# Patient Record
Sex: FEMALE | Race: BLACK OR AFRICAN AMERICAN | NOT HISPANIC OR LATINO | Employment: FULL TIME | URBAN - METROPOLITAN AREA
[De-identification: names, ages, dates, MRNs, and addresses within clinical notes are randomized per-mention and may not be internally consistent; named-entity substitution may affect disease eponyms.]

---

## 2017-04-11 ENCOUNTER — TELEPHONE (OUTPATIENT)
Dept: ORTHOPEDICS | Facility: CLINIC | Age: 32
End: 2017-04-11

## 2017-04-11 NOTE — TELEPHONE ENCOUNTER
----- Message from Yolis Parson sent at 4/11/2017  1:38 PM CDT -----  Contact: ANGELLA VELAZCO [[id number]]  x_  1st Request  _  2nd Request  _  3rd Request        Who: ANGELLA VELAZCO [[id number]]    Why: Patient would like to schedule injection, Please call, Thanks!    What Number to Call Back: [phone]    When to Expect a call back: (Before the end of the day)   -- if the call is after 12:00, the call back will be tomorrow.

## 2017-04-12 ENCOUNTER — OFFICE VISIT (OUTPATIENT)
Dept: ORTHOPEDICS | Facility: CLINIC | Age: 32
End: 2017-04-12
Payer: COMMERCIAL

## 2017-04-12 VITALS
SYSTOLIC BLOOD PRESSURE: 126 MMHG | DIASTOLIC BLOOD PRESSURE: 83 MMHG | BODY MASS INDEX: 32.27 KG/M2 | WEIGHT: 182.13 LBS | HEIGHT: 63 IN | HEART RATE: 80 BPM

## 2017-04-12 DIAGNOSIS — R52 PAIN: ICD-10-CM

## 2017-04-12 DIAGNOSIS — G56.03 BILATERAL CARPAL TUNNEL SYNDROME: Primary | ICD-10-CM

## 2017-04-12 PROCEDURE — 99213 OFFICE O/P EST LOW 20 MIN: CPT | Mod: 25,S$GLB,, | Performed by: PLASTIC SURGERY

## 2017-04-12 PROCEDURE — 99999 PR PBB SHADOW E&M-EST. PATIENT-LVL III: CPT | Mod: PBBFAC,,, | Performed by: PLASTIC SURGERY

## 2017-04-12 PROCEDURE — 20526 THER INJECTION CARP TUNNEL: CPT | Mod: 50,S$GLB,, | Performed by: PLASTIC SURGERY

## 2017-04-12 PROCEDURE — 1160F RVW MEDS BY RX/DR IN RCRD: CPT | Mod: S$GLB,,, | Performed by: PLASTIC SURGERY

## 2017-04-12 RX ORDER — TRIAMCINOLONE ACETONIDE 40 MG/ML
80 INJECTION, SUSPENSION INTRA-ARTICULAR; INTRAMUSCULAR
Status: COMPLETED | OUTPATIENT
Start: 2017-04-12 | End: 2017-04-12

## 2017-04-12 RX ORDER — LIDOCAINE HYDROCHLORIDE 10 MG/ML
2 INJECTION INFILTRATION; PERINEURAL
Status: COMPLETED | OUTPATIENT
Start: 2017-04-12 | End: 2017-04-12

## 2017-04-12 RX ADMIN — LIDOCAINE HYDROCHLORIDE 2 ML: 10 INJECTION INFILTRATION; PERINEURAL at 09:04

## 2017-04-12 RX ADMIN — TRIAMCINOLONE ACETONIDE 80 MG: 40 INJECTION, SUSPENSION INTRA-ARTICULAR; INTRAMUSCULAR at 09:04

## 2017-04-12 NOTE — MR AVS SNAPSHOT
[facility]  [address], [address]  [facility] [address]  Phone: [phone]                  Mela Blanco   2017 8:30 AM   Office Visit    Description:  Female : 1985   Provider:  Michael Agrawal Jr., MD   Department:  [facility]           Reason for Visit     Right Hand - Pain     Left Hand - Pain           Diagnoses this Visit        Comments    Bilateral carpal tunnel syndrome    -  Primary     Pain                To Do List           Goals (5 Years of Data)     None      [facility] On Call     [facility] On Call Nurse Care Line -  Assistance  Unless otherwise directed by your provider, please contact [facility], our nurse care line that is available for  assistance.     Registered nurses in the [facility] provide: appointment scheduling, clinical advisement, health education, and other advisory services.  Call: [phone] (toll free)               Medications           Message regarding Medications     Verify the changes and/or additions to your medication regime listed below are the same as discussed with your clinician today.  If any of these changes or additions are incorrect, please notify your healthcare provider.        These medications were administered today        Dose Freq    triamcinolone acetonide injection 80 mg 80 mg Clinic/HOD 1 time    Si mLs (80 mg total) by Other route one time.    Class: Normal    Route: Other    lidocaine HCL 10 mg/ml (1%) injection 2 mL 2 mL Clinic/HOD 1 time    Si mLs by Other route one time.    Class: Normal    Route: Other           Verify that the below list of medications is an accurate representation of the medications you are currently taking.  If none reported, the list may be blank. If incorrect, please contact your healthcare provider. Carry this list with you in case of emergency.           Current Medications     cetirizine (ZYRTEC) 10 MG tablet Take 10 mg by mouth  "once daily.    acetaminophen (TYLENOL) 325 MG tablet Take 325 mg by mouth every 6 (six) hours as needed for Pain.    ketoprofen (ORUDIS) 50 MG capsule Take 1 capsule (50 mg total) by mouth 3 (three) times daily as needed for Pain.           Clinical Reference Information           Your Vitals Were     BP Pulse Height Weight BMI    126/83 (BP Location: Left arm) 80 5' 3" (1.6 m) 82.6 kg (182 lb 1.6 oz) 32.26 kg/m2      Blood Pressure          Most Recent Value    BP  126/83      Allergies as of 4/12/2017     No Known Allergies      Immunizations Administered on Date of Encounter - 4/12/2017     None      Administrations This Visit     lidocaine HCL 10 mg/ml (1%) injection 2 mL     Admin Date Action Dose Route Administered By             04/12/2017 Given 2 mL Other Michael Agrawal Jr., MD                    triamcinolone acetonide injection 80 mg     Admin Date Action Dose Route Administered By             04/12/2017 Given 80 mg Other Michael Agrawal Jr., MD                      Language Assistance Services     ATTENTION: Language assistance services are available, free of charge. Please call [phone].      ATENCIÓN: Si sallie quinn, tiene a hernandez disposición servicios gratuitos de asistencia lingüística. Llame al [phone].     CHÚ Ý: N?u b?n nói Ti?ng Vi?t, có các d?ch v? h? tr? ngôn ng? mi?n phí dành cho b?n. G?i s? [phone].         [facility] complies with applicable Federal civil rights laws and does not discriminate on the basis of race, color, national origin, age, disability, or sex.        "

## 2017-04-12 NOTE — PROGRESS NOTES
"HISTORY OF PRESENT ILLNESS:  Mela returns today due to recurrent bilateral   carpal tunnel symptoms.  The patient last received injections in January 2017.    She states that the injections lasted for several months and now she is   beginning to experience increased symptoms, particularly in the morning.  She   also complains of occasional nocturnal symptoms with numbness and tingling.  She   states that her right is more severe than her left.  She is seeking bilateral   injections today into both wrists.  She states that she is unable to proceed   with surgery at this time due to limited availability off from work and she has   no other complaints today.    PHYSICAL EXAMINATION:  VITAL SIGNS:  /83 (BP Location: Left arm)  Pulse 80  Ht 5' 3" (1.6 m)  Wt 82.6 kg (182 lb 1.6 oz)  BMI 32.26 kg/m2    GENERAL:  No distress.  Alert and oriented x3, cooperative, well nourished.  EXTREMITIES:  Right hand, positive Tinel's, negative Durkan sign over the carpal   tunnel.  She has good opposition.  No thenar or hypothenar atrophy.  She is   neurovascularly intact in the median, radial and ulnar distributions.  She has   full active range of motion of all digits in all joints.  Left hand, negative   Tinel's and Durkan sign over the carpal tunnel.  She has good opposition.  No   thenar or hypothenar atrophy.  Normal sensation in the median, radial and ulnar   distributions.  Full active range of motion of all digits in all joints.    PROCEDURE: I have explained the risks, benefits, and alternatives of the procedure in detail.  The patient voices understanding and all questions have been answered.  The patient agrees to proceed as planned. After a sterile prep of the skin the bilateral carpal tunnel is injected from the volar approach using a 25 gauge needle with a combination of 1cc 1% plain xylocaine and 40 mg of Kenalog.  The patient is cautioned and immediate relief of pain is secondary to the local anesthetic " and will be temporary.  After the anesthetic wears off there may be a increase in pain that may last for a few hours or a few days and they should use ice to help alleviate this flair up of pain.       ASSESSMENT:  Bilateral carpal tunnel syndrome.    PLAN:  Today, we discussed performing repeat injections into both wrists.  The   patient wished to proceed with this.  I further discussed that the patient   should seek to undergo the surgery in the near future to help permanently   alleviate her symptoms.  She states that she will try to find time off in her   schedule.  However, she will continue with conservative management and she will   follow up with me as needed until she is ready to pursue surgery.  All questions   and concerns were addressed prior to discharge.      DELANEY/SOLOMON  dd: 04/12/2017 09:05:40 (CDT)  td: 04/13/2017 04:36:46 (CDT)  Doc ID   #[id number]  Job ID #[id number]    CC:       Dictation Confirmation Code: [id number]  Michael Agrawal Jr. MD  04/12/2017  9:03 AM

## 2017-06-30 ENCOUNTER — OFFICE VISIT (OUTPATIENT)
Dept: ORTHOPEDICS | Facility: CLINIC | Age: 32
End: 2017-06-30
Payer: COMMERCIAL

## 2017-06-30 ENCOUNTER — TELEPHONE (OUTPATIENT)
Dept: ORTHOPEDICS | Facility: CLINIC | Age: 32
End: 2017-06-30

## 2017-06-30 VITALS
HEART RATE: 89 BPM | HEIGHT: 63 IN | SYSTOLIC BLOOD PRESSURE: 128 MMHG | DIASTOLIC BLOOD PRESSURE: 83 MMHG | BODY MASS INDEX: 33.13 KG/M2 | WEIGHT: 187 LBS

## 2017-06-30 DIAGNOSIS — G56.01 RIGHT CARPAL TUNNEL SYNDROME: Primary | ICD-10-CM

## 2017-06-30 PROCEDURE — 99212 OFFICE O/P EST SF 10 MIN: CPT | Mod: 25,S$GLB,, | Performed by: PLASTIC SURGERY

## 2017-06-30 PROCEDURE — 99999 PR PBB SHADOW E&M-EST. PATIENT-LVL II: CPT | Mod: PBBFAC,,, | Performed by: PLASTIC SURGERY

## 2017-06-30 PROCEDURE — 20526 THER INJECTION CARP TUNNEL: CPT | Mod: RT,S$GLB,, | Performed by: PLASTIC SURGERY

## 2017-06-30 RX ORDER — LIDOCAINE HYDROCHLORIDE 10 MG/ML
1 INJECTION INFILTRATION; PERINEURAL
Status: COMPLETED | OUTPATIENT
Start: 2017-06-30 | End: 2017-06-30

## 2017-06-30 RX ORDER — IBUPROFEN 200 MG
200 TABLET ORAL EVERY 6 HOURS PRN
COMMUNITY
End: 2017-10-06

## 2017-06-30 RX ORDER — TRIAMCINOLONE ACETONIDE 40 MG/ML
40 INJECTION, SUSPENSION INTRA-ARTICULAR; INTRAMUSCULAR
Status: COMPLETED | OUTPATIENT
Start: 2017-06-30 | End: 2017-06-30

## 2017-06-30 RX ADMIN — LIDOCAINE HYDROCHLORIDE 1 ML: 10 INJECTION INFILTRATION; PERINEURAL at 04:06

## 2017-06-30 RX ADMIN — TRIAMCINOLONE ACETONIDE 40 MG: 40 INJECTION, SUSPENSION INTRA-ARTICULAR; INTRAMUSCULAR at 04:06

## 2017-06-30 NOTE — TELEPHONE ENCOUNTER
----- Message from Yolis Parson sent at 6/30/2017  9:55 AM CDT -----  Contact: ANGELLA VELAZCO [[id number]]  _x  1st Request  _  2nd Request  _  3rd Request        Who: ANGELLA VELAZCO [[id number]]    Why: Patient would like to be seen today for injection in right wrist for carpal tunnel. Please call, Thanks!     What Number to Call Back: [phone]    When to Expect a call back: (With in 24 hours)

## 2017-06-30 NOTE — PROGRESS NOTES
"HISTORY OF PRESENT ILLNESS:  Mela returns today due to recurrent right   carpal tunnel symptoms.  The patient last received injections in April.    She states that the injections worked well for her left hand but now she is   beginning to experience increased symptoms, particularly in the morning in the right.   She states that she is unable to proceed   with surgery at this time due to limited availability off from work and she has   no other complaints today.    PHYSICAL EXAMINATION:  VITAL SIGNS:  /83   Pulse 89   Ht 5' 3" (1.6 m)   Wt 84.8 kg (187 lb)   BMI 33.13 kg/m²     GENERAL:  No distress.  Alert and oriented x3, cooperative, well nourished.  EXTREMITIES:  Right hand, positive Tinel's, negative Durkan sign over the carpal   tunnel.  She has good opposition.  No thenar or hypothenar atrophy.  She is   neurovascularly intact in the median, radial and ulnar distributions.  She has   full active range of motion of all digits in all joints.  Left hand, negative   Tinel's and Durkan sign over the carpal tunnel.  She has good opposition.  No   thenar or hypothenar atrophy.  Normal sensation in the median, radial and ulnar   distributions.  Full active range of motion of all digits in all joints.    PROCEDURE: I have explained the risks, benefits, and alternatives of the procedure in detail.  The patient voices understanding and all questions have been answered.  The patient agrees to proceed as planned. After a sterile prep of the skin the right carpal tunnel is injected from the volar approach using a 25 gauge needle with a combination of 1cc 1% plain xylocaine and 40 mg of Kenalog.  The patient is cautioned and immediate relief of pain is secondary to the local anesthetic and will be temporary.  After the anesthetic wears off there may be a increase in pain that may last for a few hours or a few days and they should use ice to help alleviate this flair up of pain.       ASSESSMENT:  Bilateral " carpal tunnel syndrome.    PLAN:  Today, we discussed performing repeat injections into right carpal tunnel today.  The   patient wished to proceed with this.  I further discussed that the patient   should seek to undergo the surgery in the near future to help permanently   alleviate her symptoms.  She states that she will try to find time off in her   schedule.  All questions   and concerns were addressed prior to discharge.

## 2017-07-26 ENCOUNTER — TELEPHONE (OUTPATIENT)
Dept: INTERNAL MEDICINE | Facility: CLINIC | Age: 32
End: 2017-07-26

## 2017-07-26 NOTE — TELEPHONE ENCOUNTER
----- Message from Jeana Novoa sent at 7/26/2017 12:48 PM CDT -----  Contact: self/[phone]  Patient called in regards needing to place an order for labs for her annual. Please call and advise.       Thank you!!!

## 2017-08-16 ENCOUNTER — OFFICE VISIT (OUTPATIENT)
Dept: ORTHOPEDICS | Facility: CLINIC | Age: 32
End: 2017-08-16
Payer: COMMERCIAL

## 2017-08-16 VITALS
HEIGHT: 63 IN | DIASTOLIC BLOOD PRESSURE: 83 MMHG | SYSTOLIC BLOOD PRESSURE: 120 MMHG | BODY MASS INDEX: 33.13 KG/M2 | RESPIRATION RATE: 18 BRPM | WEIGHT: 187 LBS | HEART RATE: 83 BPM

## 2017-08-16 DIAGNOSIS — G56.02 LEFT CARPAL TUNNEL SYNDROME: Primary | ICD-10-CM

## 2017-08-16 PROCEDURE — 20526 THER INJECTION CARP TUNNEL: CPT | Mod: LT,S$GLB,, | Performed by: PLASTIC SURGERY

## 2017-08-16 PROCEDURE — 99999 PR PBB SHADOW E&M-EST. PATIENT-LVL III: CPT | Mod: PBBFAC,,, | Performed by: PLASTIC SURGERY

## 2017-08-16 PROCEDURE — 3008F BODY MASS INDEX DOCD: CPT | Mod: S$GLB,,, | Performed by: PLASTIC SURGERY

## 2017-08-16 PROCEDURE — 99212 OFFICE O/P EST SF 10 MIN: CPT | Mod: 25,S$GLB,, | Performed by: PLASTIC SURGERY

## 2017-08-16 RX ORDER — LIDOCAINE HYDROCHLORIDE 10 MG/ML
1 INJECTION, SOLUTION EPIDURAL; INFILTRATION; INTRACAUDAL; PERINEURAL ONCE
Status: COMPLETED | OUTPATIENT
Start: 2017-08-16 | End: 2017-08-16

## 2017-08-16 RX ORDER — TRIAMCINOLONE ACETONIDE 40 MG/ML
40 INJECTION, SUSPENSION INTRA-ARTICULAR; INTRAMUSCULAR
Status: COMPLETED | OUTPATIENT
Start: 2017-08-16 | End: 2017-08-16

## 2017-08-16 RX ADMIN — TRIAMCINOLONE ACETONIDE 40 MG: 40 INJECTION, SUSPENSION INTRA-ARTICULAR; INTRAMUSCULAR at 09:08

## 2017-08-16 RX ADMIN — LIDOCAINE HYDROCHLORIDE 10 MG: 10 INJECTION, SOLUTION EPIDURAL; INFILTRATION; INTRACAUDAL; PERINEURAL at 10:08

## 2017-08-16 NOTE — PROGRESS NOTES
"HISTORY OF PRESENT ILLNESS:  Ms. Blanco returns today due to increasing   numbness and tingling and occasional discomfort and pain in the left hand and   fingers.  The patient states that her last injection was in April 2017 from   which she had significant relief up until several weeks ago when she began to   experience her symptoms again.  She states that her right hand also experiences   some numbness and tingling; however, it is not nearly as bad.  She states that   she is unable to schedule surgery at this time as she has a mail delivery route   and time off at this point just before peak season is very little and therefore,   she is unable to pursue surgery.  She has no other complaints today.    PHYSICAL EXAMINATION:  /83   Pulse 83   Resp 18   Ht 5' 3" (1.6 m)   Wt 84.8 kg (187 lb)   BMI 33.13 kg/m²     GENERAL:  No acute distress, alert and oriented x3, cooperative, well nourished.  LUNGS:  Nonlabored, on room air .  CARDIOVASCULAR:  Distal pulses intact.  Good capillary refill.  No clubbing,   cyanosis or edema. LEFT UPPER EXTREMITY:  She has full active range of motion of   all digits in all joints.  She has good opposition.  No thenar or hypothenar   atrophy.  Normal sensation in median, radial and ulnar distributions.  Positive   Tinel's and Durkan sign over the carpal tunnel with good capillary refill.    PROCEDURE:  I have explained the risks, benefits, and alternatives of the procedure in detail.  The patient voices understanding and all questions have been answered.  The patient agrees to proceed as planned. After a sterile prep of the skin the left carpal tunnel is injected from the volar approach using a 25 gauge needle with a combination of 1cc 1% plain xylocaine and 40 mg of Kenalog.  The patient is cautioned and immediate relief of pain is secondary to the local anesthetic and will be temporary.  After the anesthetic wears off there may be a increase in pain that may last for a few " hours or a few days and they should use ice to help alleviate this flair up of pain.       ASSESSMENT:  Left carpal tunnel syndrome.    PLAN:  Due to the patient's inability to take time off her work and her need for   relief from her painful symptoms, I discussed performing a repeat injection   into the carpal tunnel.  I further discussed that multiple injections puts her   at higher risk for complications such as tendon injury or rupture.  The patient   understood this.  She states that she is absolutely unable to pursue surgery at   this time.  She is hoping that after the busy holiday season then she may be   able to take time off to have both hands done at the same time.  Therefore, she   was offered the injection.  She wished to proceed.  Please see the procedure   note above.  She will follow up with me as needed.  All questions and concerns   were addressed prior to discharge.      DELANEY/SOLOMON  dd: 08/16/2017 09:29:15 (CDT)  td: 08/16/2017 12:20:45 (CDT)  Doc ID   #[id number]  Job ID #[id number]    CC:       Dictation Confirmation Code: [id number]  Michael Agrawal Jr. MD  08/16/2017  9:25 AM

## 2017-10-06 ENCOUNTER — HOSPITAL ENCOUNTER (OUTPATIENT)
Dept: RADIOLOGY | Facility: HOSPITAL | Age: 32
Discharge: HOME | End: 2017-10-06
Attending: INTERNAL MEDICINE
Payer: COMMERCIAL

## 2017-10-06 ENCOUNTER — OFFICE VISIT (OUTPATIENT)
Dept: INTERNAL MEDICINE | Facility: CLINIC | Age: 32
End: 2017-10-06
Payer: COMMERCIAL

## 2017-10-06 VITALS
HEART RATE: 81 BPM | BODY MASS INDEX: 34.76 KG/M2 | HEIGHT: 63 IN | WEIGHT: 196.19 LBS | SYSTOLIC BLOOD PRESSURE: 108 MMHG | DIASTOLIC BLOOD PRESSURE: 80 MMHG

## 2017-10-06 DIAGNOSIS — M25.561 CHRONIC PAIN OF RIGHT KNEE: ICD-10-CM

## 2017-10-06 DIAGNOSIS — Z00.00 ROUTINE MEDICAL EXAM: Primary | ICD-10-CM

## 2017-10-06 DIAGNOSIS — E66.9 OBESITY (BMI 30.0-34.9): ICD-10-CM

## 2017-10-06 DIAGNOSIS — G89.29 CHRONIC PAIN OF RIGHT KNEE: ICD-10-CM

## 2017-10-06 DIAGNOSIS — M79.671 BILATERAL FOOT PAIN: ICD-10-CM

## 2017-10-06 DIAGNOSIS — M79.672 BILATERAL FOOT PAIN: ICD-10-CM

## 2017-10-06 DIAGNOSIS — Z98.84 BARIATRIC SURGERY STATUS: ICD-10-CM

## 2017-10-06 DIAGNOSIS — J30.89 PERENNIAL ALLERGIC RHINITIS: ICD-10-CM

## 2017-10-06 DIAGNOSIS — K21.9 GASTROESOPHAGEAL REFLUX DISEASE, ESOPHAGITIS PRESENCE NOT SPECIFIED: ICD-10-CM

## 2017-10-06 DIAGNOSIS — Z83.3 FAMILY HISTORY OF TYPE 2 DIABETES MELLITUS: ICD-10-CM

## 2017-10-06 PROCEDURE — 73560 X-RAY EXAM OF KNEE 1 OR 2: CPT | Mod: TC,RT

## 2017-10-06 PROCEDURE — 99999 PR PBB SHADOW E&M-EST. PATIENT-LVL IV: CPT | Mod: PBBFAC,,, | Performed by: INTERNAL MEDICINE

## 2017-10-06 PROCEDURE — 73560 X-RAY EXAM OF KNEE 1 OR 2: CPT | Mod: 26,RT,, | Performed by: RADIOLOGY

## 2017-10-06 PROCEDURE — 99395 PREV VISIT EST AGE 18-39: CPT | Mod: S$GLB,,, | Performed by: INTERNAL MEDICINE

## 2017-10-06 RX ORDER — CETIRIZINE HYDROCHLORIDE 10 MG/1
10 TABLET ORAL DAILY
Qty: 30 TABLET | Refills: 11 | Status: SHIPPED | OUTPATIENT
Start: 2017-10-06 | End: 2018-02-21 | Stop reason: SDUPTHER

## 2017-10-06 RX ORDER — ONDANSETRON HYDROCHLORIDE 8 MG/1
TABLET, FILM COATED ORAL
Refills: 2 | COMMUNITY
Start: 2017-07-26 | End: 2017-10-06

## 2017-10-06 RX ORDER — IBUPROFEN 600 MG/1
600 TABLET ORAL EVERY 8 HOURS PRN
Qty: 40 TABLET | Refills: 5 | Status: SHIPPED | OUTPATIENT
Start: 2017-10-06 | End: 2018-03-23

## 2017-10-06 RX ORDER — PHENTERMINE HYDROCHLORIDE 37.5 MG/1
TABLET ORAL
COMMUNITY
Start: 2017-09-25 | End: 2017-11-01

## 2017-10-06 NOTE — PROGRESS NOTES
Subjective:       Patient ID: Mela Blanco is a 31 y.o. female.    Chief Complaint: Annual Exam    Last seen 28 months ago. Returns for annual exam with no acute complaints. Multiple chronic problems she has been managing on her own that she would like assistance with - see below.    PMH: , miscx1, abx1.  H/O Headaches.   Carpal Tunnel Syndrome - Ortho .   Bariatric Surgery status, seen recently by Surgeon Dr. Terra Denney/[facility] - Adipex prescribed, no labs done.     PSH: Breast reduction. Lap band . Abscess right thigh.     Pelvic exam  -Gyn in [facility]. Eye exam 10/16. Declines Flu shots. Labs 5/15: CBC and CMP normal, HbA1c 5.2%, TSH 0.672, Vit D 20; Ferritin, B12 and Folate normal, TChol 173, TG 56, HDL 66, LDL 96, Urinalysis clear.     Social: Non-smoker, occasional alcohol. Sexually active with condoms, daughter. .     FMH: Arthritis in father. HTN and DM in mother. Stroke and heart disease in grandparents.     NKDA.     Medications: OTC Ibuprofen up to 800mg almost daily. Mirena IUD. OTC Zyrtec and Zantac prn. No vitamins at present.         Review of Systems   Constitutional: Negative for activity change, appetite change, fatigue, fever and unexpected weight change.   HENT: Positive for rhinorrhea and sneezing. Negative for congestion, ear pain, hearing loss, sinus pain, sinus pressure, sore throat, tinnitus, trouble swallowing and voice change.         Chronic rhinitis relieved with Zyrtec, she would like it prescribed. No sinus pain or purulent nasal discharge.    Eyes: Negative for pain, redness and visual disturbance.   Respiratory: Negative for cough, chest tightness, shortness of breath and wheezing.    Cardiovascular: Negative for chest pain, palpitations and leg swelling.   Gastrointestinal: Negative for abdominal pain, anal bleeding, blood in stool, constipation, diarrhea, nausea and vomiting.        Frequent heartburn with spicy or greasy  "food, uses Zantac with relief, would like it prescribed.    Genitourinary: Negative for difficulty urinating, dysuria, flank pain, frequency, hematuria and urgency.        Irregular menstrual bleeding since receiving IUD six months ago.    Musculoskeletal: Positive for arthralgias. Negative for back pain, gait problem, joint swelling, myalgias and neck pain.        Chronic right knee pain x years, not previously evaluated - it does not swell. Chronic bilateral foot pain, plantar aspect - aggravated by her job as a . Uses Ibuprofen quite regularly.    Skin: Negative for color change and rash.   Neurological: Negative for dizziness, syncope, facial asymmetry, speech difficulty, weakness, numbness and headaches.   Hematological: Negative for adenopathy. Does not bruise/bleed easily.   Psychiatric/Behavioral: Negative for decreased concentration, dysphoric mood and sleep disturbance. The patient is not nervous/anxious.        Objective:    /80, Pulse 81, Ht 5' 3", Wt 196 lbs (from 171 two years ago), BMI=34.76  Physical Exam   Constitutional: She is oriented to person, place, and time. She appears well-developed and well-nourished. No distress.   HENT:   Head: Normocephalic and atraumatic.   Right Ear: External ear normal.   Left Ear: External ear normal.   Nose: Nose normal.   Mouth/Throat: Oropharynx is clear and moist. No oropharyngeal exudate.   Eyes: Conjunctivae and EOM are normal. Pupils are equal, round, and reactive to light. Right conjunctiva is not injected. Left conjunctiva is not injected. No scleral icterus.   Neck: Normal range of motion. Neck supple. No JVD present. No thyromegaly present.   Cardiovascular: Normal rate, regular rhythm, normal heart sounds and intact distal pulses.  Exam reveals no gallop and no friction rub.    No murmur heard.  Pulmonary/Chest: Effort normal and breath sounds normal. No respiratory distress. She has no wheezes. She has no rhonchi. She has no rales. "   Abdominal: Soft. Bowel sounds are normal. She exhibits no distension and no mass. There is no hepatosplenomegaly. There is no tenderness. There is no CVA tenderness.   Musculoskeletal: Normal range of motion. She exhibits no edema, tenderness or deformity.   Mild crepitus in right knee, no effusion or tenderness. Foot exam normal bilaterally, no skin lesions, bony deformity.    Lymphadenopathy:     She has no cervical adenopathy.   Neurological: She is alert and oriented to person, place, and time. She has normal strength and normal reflexes. No cranial nerve deficit. She exhibits normal muscle tone. Coordination and gait normal.   Skin: Skin is warm and dry. No lesion and no rash noted. She is not diaphoretic. No cyanosis or erythema. No pallor. Nails show no clubbing.   Psychiatric: She has a normal mood and affect. Her behavior is normal. Judgment and thought content normal.   Vitals reviewed.      Assessment:       1. Routine medical exam        Plan:       Routine medical exam  -     CBC auto differential; Future; Expected date: 10/06/2017  -     Comprehensive metabolic panel; Future; Expected date: 10/06/2017  -     Cholesterol, total; Future; Expected date: 10/06/2017    Bariatric surgery status  -     Vitamin D; Future; Expected date: 10/06/2017  -     Vitamin B12; Future; Expected date: 10/06/2017  -     Ferritin; Future; Expected date: 10/06/2017    Obesity (BMI 30.0-34.9)        -     Has not been compliant with diet, counseled on healthy choices in addition to portion controll.    Family history of type 2 diabetes mellitus  -     Hemoglobin A1c; Future; Expected date: 10/06/2017    Bilateral foot pain  -     Ambulatory Referral to Podiatry  -     ibuprofen (ADVIL,MOTRIN) 600 MG tablet; Take 1 tablet (600 mg total) by mouth every 8 (eight) hours as needed for Pain.  Dispense: 40 tablet; Refill: 5    Chronic pain of right knee  -     X-Ray Knee 1 or 2 View Right; Future; Expected date: 10/06/2017  -      Ambulatory Referral to Orthopedics  -     ibuprofen (ADVIL,MOTRIN) 600 MG tablet; Take 1 tablet (600 mg total) by mouth every 8 (eight) hours as needed for Pain.  Dispense: 40 tablet; Refill: 5    Gastroesophageal reflux disease, esophagitis presence not specified  -     ranitidine (ZANTAC) 150 MG capsule; Take 1 capsule (150 mg total) by mouth 2 (two) times daily as needed for Heartburn.  Dispense: 60 capsule; Refill: 11    Perennial allergic rhinitis  -     cetirizine (ZYRTEC) 10 MG tablet; Take 1 tablet (10 mg total) by mouth once daily. For allergies.  Dispense: 30 tablet; Refill: 11    She declines a Flu shot.

## 2017-10-18 ENCOUNTER — OFFICE VISIT (OUTPATIENT)
Dept: URGENT CARE | Facility: CLINIC | Age: 32
End: 2017-10-18
Payer: OTHER GOVERNMENT

## 2017-10-18 DIAGNOSIS — S61.012A LACERATION OF LEFT THUMB WITHOUT FOREIGN BODY WITHOUT DAMAGE TO NAIL, INITIAL ENCOUNTER: Primary | ICD-10-CM

## 2017-10-18 DIAGNOSIS — L03.012 CELLULITIS OF LEFT THUMB: ICD-10-CM

## 2017-10-18 PROCEDURE — 99203 OFFICE O/P NEW LOW 30 MIN: CPT | Mod: 25,S$GLB,, | Performed by: PREVENTIVE MEDICINE

## 2017-10-18 PROCEDURE — 90715 TDAP VACCINE 7 YRS/> IM: CPT | Mod: S$GLB,,, | Performed by: PREVENTIVE MEDICINE

## 2017-10-18 PROCEDURE — 90471 IMMUNIZATION ADMIN: CPT | Mod: S$GLB,,, | Performed by: PREVENTIVE MEDICINE

## 2017-10-18 RX ORDER — DOXYCYCLINE HYCLATE 100 MG
100 TABLET ORAL 2 TIMES DAILY
Qty: 10 TABLET | Refills: 1 | Status: SHIPPED | OUTPATIENT
Start: 2017-10-18 | End: 2017-10-18 | Stop reason: SDUPTHER

## 2017-10-18 RX ORDER — DOXYCYCLINE HYCLATE 100 MG
100 TABLET ORAL 2 TIMES DAILY
Qty: 10 TABLET | Refills: 1 | Status: SHIPPED | OUTPATIENT
Start: 2017-10-18 | End: 2017-11-01

## 2017-10-18 NOTE — PROGRESS NOTES
Subjective:       Patient ID: Mela Blanco is a 31 y.o. female.    Chief Complaint: Laceration    New injury (10/17/17) Pt works as . 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       Laceration    The incident occurred 12 to 24 hours ago. The laceration is located on the left hand. The laceration mechanism was a blunt object. The pain is at a severity of 3/10. The pain is mild. The pain has been constant since onset. It is unknown if a foreign body is present. Her tetanus status is out of date.     Review of Systems   Constitution: Negative for chills, fever and weakness.   HENT: Negative for congestion, ear pain, nosebleeds and tinnitus.    Eyes: Negative for blurred vision and pain.   Cardiovascular: Negative for chest pain and palpitations.   Respiratory: Negative for cough, shortness of breath and wheezing.    Hematologic/Lymphatic: Does not bruise/bleed easily.   Skin: Negative for dry skin, itching, poor wound healing, rash and skin cancer.   Musculoskeletal: Negative for back pain, joint pain, muscle weakness, neck pain and stiffness.   Gastrointestinal: Negative for abdominal pain, constipation, diarrhea, nausea and vomiting.   Genitourinary: Negative for dysuria and hematuria.   Neurological: Negative for dizziness, headaches, numbness and seizures.   Allergic/Immunologic: Negative for hives.   All other systems reviewed and are negative.      Objective:      Physical Exam   Constitutional: She appears well-developed and well-nourished.   HENT:   Head: Normocephalic.   Eyes: Pupils are equal, round, and reactive to light.   Neck: Normal range of motion.   Cardiovascular: Normal rate.    Pulmonary/Chest: Effort normal.   Musculoskeletal:        Lumbar back: She exhibits no bony tenderness, no swelling, no edema, no deformity, no laceration, no spasm and normal pulse.   Neurological: She is alert.   No focal neurologic deficits   Skin: Skin is warm.  Laceration noted.        Psychiatric: She has a normal mood and affect.   Nursing note and vitals reviewed.      Assessment:       1. Laceration of left thumb without foreign body without damage to nail, initial encounter    2. Cellulitis of left thumb        Plan:           Medications Ordered This Encounter      DIPH,PERTUSS,ACEL,,TET VAC,PF, ADULT (ADACEL) 2 Lf-(2.5-5-3-5 mcg)-5Lf/0.5 mL Syrg          Sig: Inject 0.5 mLs into the muscle once.          Dispense:  0.5 mL          Refill:  0      doxycycline (VIBRA-TABS) 100 MG tablet          Sig: Take 1 tablet (100 mg total) by mouth 2 (two) times daily.          Dispense:  10 tablet          Refill:  1  Patient Instructions: Keep dressing clean/dry/covered (Cleanse wound at home daily)   Restrictions: Limited use of left hand and arm, No lifting/pushing/pulling more than 25 lbs  Return in about 1 week (around 10/25/2017).

## 2017-10-24 ENCOUNTER — TELEPHONE (OUTPATIENT)
Dept: ORTHOPEDICS | Facility: CLINIC | Age: 32
End: 2017-10-24

## 2017-10-24 NOTE — TELEPHONE ENCOUNTER
----- Message from Yolis Roberts sent at 10/24/2017  2:13 PM CDT -----  Contact: ANGELLA VELAZCO [[id number]]  _  1st Request  _  2nd Request  _  3rd Request        Who: ANGELLA VELAZCO [[id number]]    Why: patient states she would like to know if she can come for injections in her wrist on tomorrow? Please advise.     What Number to Call Back: [phone]    When to Expect a call back: (Before the end of the day)   -- if call after 3:00 call back will be tomorrow.

## 2017-10-25 ENCOUNTER — DOCUMENTATION ONLY (OUTPATIENT)
Dept: ORTHOPEDICS | Facility: CLINIC | Age: 32
End: 2017-10-25

## 2017-10-25 ENCOUNTER — OFFICE VISIT (OUTPATIENT)
Dept: URGENT CARE | Facility: CLINIC | Age: 32
End: 2017-10-25
Payer: OTHER GOVERNMENT

## 2017-10-25 DIAGNOSIS — S61.012D LACERATION OF LEFT THUMB WITHOUT FOREIGN BODY WITHOUT DAMAGE TO NAIL, SUBSEQUENT ENCOUNTER: Primary | ICD-10-CM

## 2017-10-25 DIAGNOSIS — L03.012 CELLULITIS OF LEFT THUMB: ICD-10-CM

## 2017-10-25 PROCEDURE — 99213 OFFICE O/P EST LOW 20 MIN: CPT | Mod: S$GLB,,, | Performed by: NURSE PRACTITIONER

## 2017-10-25 NOTE — PROGRESS NOTES
Subjective:       Patient ID: Mela Blanco is a 31 y.o. female.    Chief Complaint: Laceration (left thumb)    F/u left thumb laceration (doi 10/17/2017) Pt reports soreness/pain to thumb only when direct pressure is applied. Reports she has completed medications.  Denies fever or chills-dmg      Laceration    The incident occurred more than 1 week ago. The laceration is located on the left hand. The laceration mechanism was a blunt object. The pain is at a severity of 0/10. The patient is experiencing no pain. She reports no foreign bodies present. Her tetanus status is UTD.     Review of Systems   Constitution: Negative for chills, fever and weakness.   HENT: Negative for congestion, ear pain, nosebleeds and tinnitus.    Eyes: Negative for blurred vision and pain.   Cardiovascular: Negative for chest pain and palpitations.   Respiratory: Negative for cough, shortness of breath and wheezing.    Hematologic/Lymphatic: Does not bruise/bleed easily.   Skin: Negative for dry skin, itching, poor wound healing, rash and skin cancer.   Musculoskeletal: Negative for back pain, joint pain, joint swelling, muscle weakness, myalgias, neck pain and stiffness.   Gastrointestinal: Negative for abdominal pain, constipation, diarrhea, nausea and vomiting.   Genitourinary: Negative for dysuria and hematuria.   Neurological: Negative for dizziness, headaches, numbness and seizures.   Allergic/Immunologic: Negative for hives.   All other systems reviewed and are negative.      Objective:      Physical Exam   Constitutional: She is oriented to person, place, and time. She appears well-developed and well-nourished.   Cardiovascular: Normal rate and regular rhythm.    Pulmonary/Chest: Effort normal and breath sounds normal.   Abdominal: Soft. Bowel sounds are normal.   Musculoskeletal: Normal range of motion.   Neurological: She is alert and oriented to person, place, and time.   Skin: Skin is warm, dry and intact. Capillary  refill takes less than 2 seconds. No bruising, no ecchymosis and no petechiae noted. Laceration: well healed. No erythema. No pallor.   Left thumb laceration well healed.  Normal assessment.  No pain elicited.  Full ROM and fine motor intact. Sensory and circulation intact.   Psychiatric: She has a normal mood and affect. Her behavior is normal.       Assessment:       1. Laceration of left thumb without foreign body without damage to nail, subsequent encounter    2. Cellulitis of left thumb        Plan:       Mela was seen today for laceration.    Diagnoses and all orders for this visit:    Laceration of left thumb without foreign body without damage to nail, subsequent encounter    Cellulitis of left thumb         Patient Instructions:  (Call with any questions and or concerns)   Restrictions: Regular Duty  Return if symptoms worsen or fail to improve.

## 2017-10-25 NOTE — PATIENT INSTRUCTIONS
Discharge Instructions for Cellulitis  You have been diagnosed with cellulitis. This is an infection in the deepest layer of the skin. In some cases, the infection also affects the muscle. Cellulitis is caused by bacteria. The bacteria can enter the body through broken skin. This can happen with a cut, scratch, animal bite, or an insect bite that has been scratched. You may have been treated in the hospital with antibiotics and fluids. You will likely be given a prescription for antibiotics to take at home. This sheet will help you take care of yourself at home.  Home care  When you are home:  · Take the prescribed antibiotic medicine you are given as directed until it is gone. Take it even if you feel better. It treats the infection and stops it from returning. Not taking all the medicine can make future infections hard to treat.  · Keep the infected area clean.  · When possible, raise the infected area above the level of your heart. This helps keep swelling down.  · Talk with your healthcare provider if you are in pain. Ask what kind of over-the-counter medicine you can take for pain.  · Apply clean bandages as advised.  · Take your temperature once a day for a week.  · Wash your hands often to prevent spreading the infection.  In the future, wash your hands before and after you touch cuts, scratches, or bandages. This will help prevent infection.   When to call your healthcare provider  Call your healthcare provider immediately if you have any of the following:  · Difficulty or pain when moving the joints above or below the infected area  · Discharge or pus draining from the area  · Fever of 100.4°F (38°C) or higher, or as directed by your healthcare provider  · Pain that gets worse in or around the infected   · Redness that gets worse in or around the infected area, particularly if the area of redness expands to a wider area  · Shaking chills  · Swelling of the infected area  · Vomiting   Date Last Reviewed:  8/1/2016  © 5657-3352 [employer], [employer]. [address] [place] [address], [place] [address]. All rights reserved. This information is not intended as a substitute for professional medical care. Always follow your healthcare professional's instructions.

## 2017-10-30 DIAGNOSIS — M79.641 BILATERAL HAND PAIN: Primary | ICD-10-CM

## 2017-10-30 DIAGNOSIS — M79.642 BILATERAL HAND PAIN: Primary | ICD-10-CM

## 2017-11-01 ENCOUNTER — OFFICE VISIT (OUTPATIENT)
Dept: ORTHOPEDICS | Facility: CLINIC | Age: 32
End: 2017-11-01
Payer: COMMERCIAL

## 2017-11-01 ENCOUNTER — HOSPITAL ENCOUNTER (OUTPATIENT)
Dept: RADIOLOGY | Facility: OTHER | Age: 32
Discharge: HOME | End: 2017-11-01
Attending: PLASTIC SURGERY
Payer: COMMERCIAL

## 2017-11-01 VITALS
HEART RATE: 78 BPM | WEIGHT: 196 LBS | DIASTOLIC BLOOD PRESSURE: 83 MMHG | BODY MASS INDEX: 34.73 KG/M2 | HEIGHT: 63 IN | SYSTOLIC BLOOD PRESSURE: 115 MMHG

## 2017-11-01 DIAGNOSIS — G56.01 RIGHT CARPAL TUNNEL SYNDROME: Primary | ICD-10-CM

## 2017-11-01 DIAGNOSIS — M79.641 BILATERAL HAND PAIN: ICD-10-CM

## 2017-11-01 DIAGNOSIS — M79.642 BILATERAL HAND PAIN: ICD-10-CM

## 2017-11-01 PROCEDURE — 20526 THER INJECTION CARP TUNNEL: CPT | Mod: RT,S$GLB,, | Performed by: PLASTIC SURGERY

## 2017-11-01 PROCEDURE — 73130 X-RAY EXAM OF HAND: CPT | Mod: 26,50,, | Performed by: RADIOLOGY

## 2017-11-01 PROCEDURE — 99999 PR PBB SHADOW E&M-EST. PATIENT-LVL II: CPT | Mod: PBBFAC,,, | Performed by: PLASTIC SURGERY

## 2017-11-01 PROCEDURE — 73130 X-RAY EXAM OF HAND: CPT | Mod: 50,TC

## 2017-11-01 PROCEDURE — 99213 OFFICE O/P EST LOW 20 MIN: CPT | Mod: 25,S$GLB,, | Performed by: PLASTIC SURGERY

## 2017-11-01 RX ORDER — LIDOCAINE HYDROCHLORIDE 10 MG/ML
1 INJECTION INFILTRATION; PERINEURAL
Status: COMPLETED | OUTPATIENT
Start: 2017-11-01 | End: 2017-11-01

## 2017-11-01 RX ORDER — TRIAMCINOLONE ACETONIDE 40 MG/ML
40 INJECTION, SUSPENSION INTRA-ARTICULAR; INTRAMUSCULAR
Status: COMPLETED | OUTPATIENT
Start: 2017-11-01 | End: 2017-11-01

## 2017-11-01 RX ADMIN — TRIAMCINOLONE ACETONIDE 40 MG: 40 INJECTION, SUSPENSION INTRA-ARTICULAR; INTRAMUSCULAR at 11:11

## 2017-11-01 RX ADMIN — LIDOCAINE HYDROCHLORIDE 1 ML: 10 INJECTION INFILTRATION; PERINEURAL at 11:11

## 2017-11-01 NOTE — PROGRESS NOTES
"HISTORY OF PRESENT ILLNESS:  Ms. Blanco returned to clinic due to increasing   numbness and tingling within the right hand.  She states that her injection,   which she received in June of this year, has last her for several months and has   now begun to wore off.  She also states that the injection she received in the   left wrist has continued to work and she is no longer experiencing symptoms.    The patient states that she is unable do surgery at this time due to the busy   season of the year as a .  She would like to possibly do the surgery   in January of next year.  She would like to discuss possible repeat injection   in the right wrist today.    PHYSICAL EXAMINATION:  VITAL SIGNS:  /83 (BP Location: Left arm, Patient Position: Sitting, BP Method: Medium (Automatic))   Pulse 78   Ht 5' 3" (1.6 m)   Wt 88.9 kg (196 lb)   BMI 34.72 kg/m²     GENERAL:  No acute distress, alert and oriented x3, cooperative, well nourished.  LUNGS:  Nonlabored on room air.  CARDIOVASCULAR:  Distal pulses intact.  Good capillary refill.  No clubbing,   cyanosis or edema.  EXTREMITIES:  Right upper extremity, positive Tinel's and Durkan's sign of   carpal tunnel, full active range of motion in all digits at all joints.  There   is tenderness to deep palpation of the CMC joint of the right thumb with no   subluxation or deformities noted.  She is neurovascularly intact in the median,   radial, and ulnar distributions.    RADIOLOGY IMPRESSION:  Bilateral hand xrays: Technique: AP, lateral and oblique views of the hands bilaterally    Comparison: 10/28/2016    Results: No significant change from prior. There is no evidence for acute fracture or dislocation of the hands bilaterally. Articular spaces are relatively maintained. There is no focal bone erosion.  PROCEDURE:  I have explained the risks, benefits, and alternatives of the procedure in detail.  The patient voices understanding and all questions have been " answered.  The patient agrees to proceed as planned. After a sterile prep of the skin the right carpal tunnel is injected from the volar approach using a 25 gauge needle with a combination of 1cc 1% plain xylocaine and 40 mg of Kenalog.  The patient is cautioned and immediate relief of pain is secondary to the local anesthetic and will be temporary.  After the anesthetic wears off there may be a increase in pain that may last for a few hours or a few days and they should use ice to help alleviate this flair up of pain.     ASSESSMENT:  1.  Right carpal tunnel syndrome.  2.  Right thumb pain.    PLAN:  Based on the patient's recurrent symptoms, I discussed performing a   repeat injection to the carpal tunnel today on the right side.  This should help   alleviate her symptoms until the patient is able to pursue surgery within the   early next year.  I discussed that her pain within the base of the thumb may be   related to the development of inflammation and possible arthritis.  I discussed   she may benefit from obtaining a MetaGrip splint to wear during activities to   help support her joint.  The x-rays appeared to be clear with no evidence of   arthritis.  Therefore, she was instructed to use NSAIDs for pain control.  The   patient agreed with this above assessment and plan.  All questions and concerns   were addressed prior to discharge.      DELANEY/SOLOMON  dd: 11/01/2017 10:52:48 (CDT)  td: 11/02/2017 02:56:26 (CDT)  Doc ID   #[id number]  Job ID #[id number]    CC:       Dictation Confirmation Code: [id number]  Michael Agrawal Jr. MD  11/01/2017  10:49 AM

## 2017-12-13 ENCOUNTER — TELEPHONE (OUTPATIENT)
Dept: ORTHOPEDICS | Facility: CLINIC | Age: 32
End: 2017-12-13

## 2017-12-13 NOTE — TELEPHONE ENCOUNTER
Ortho Telephone Triage Message  [phone]  Patient C/O: R knee pain over past two years. Pt denies known work-related injury to R knee. Noted appt has been scheduled tomorrow with AMARILYS Patrick PA-C/Sports Medicine. Pt requests same day ortho appt, if available.   Triage Advice: Advised pt that no appt is, presently, available today in Ortho Clinic and that Sports Medicine will be notified, if appt can be rescheduled to today. Otherwise, tomorrow's appt in Sports Medicine is first available.   Resolution:Pt states understanding and will await call from Sports Medicine.

## 2017-12-13 NOTE — TELEPHONE ENCOUNTER
----- Message from Janak Fortune sent at 12/13/2017  8:04 AM CST -----  Contact: Self/[phone]  Pt called in requesting to be seen today for rt knee pain that has been ongoing for the past month. Pt also reports issues with bending the knee. When standing up, pt reports 8/10 pain, and when sitting reports 6-7/10. Pt reports that the pain was so intense this morning that pt called out from work. Pt doesn't report further symptoms.  Upon searching in [facility] [facility] and [facility], the soonest appt I could find was 12/14 at 7:30 in the morning with Leandra Patrick. I scheduled the pt for this appt and added pt to the wait list after pt confirmed being okay with either [employer] or [facility]. Pt would still like to be seen today, though.  Pt can be reached at new cell phone number, [phone].

## 2017-12-14 ENCOUNTER — OFFICE VISIT (OUTPATIENT)
Dept: SPORTS MEDICINE | Facility: CLINIC | Age: 32
End: 2017-12-14
Payer: COMMERCIAL

## 2017-12-14 ENCOUNTER — TELEPHONE (OUTPATIENT)
Dept: ORTHOPEDICS | Facility: CLINIC | Age: 32
End: 2017-12-14

## 2017-12-14 ENCOUNTER — HOSPITAL ENCOUNTER (OUTPATIENT)
Dept: RADIOLOGY | Facility: HOSPITAL | Age: 32
Discharge: HOME | End: 2017-12-14
Attending: PHYSICIAN ASSISTANT
Payer: COMMERCIAL

## 2017-12-14 VITALS
DIASTOLIC BLOOD PRESSURE: 72 MMHG | SYSTOLIC BLOOD PRESSURE: 110 MMHG | HEART RATE: 77 BPM | BODY MASS INDEX: 34.73 KG/M2 | HEIGHT: 63 IN | WEIGHT: 196 LBS

## 2017-12-14 DIAGNOSIS — M25.569 KNEE PAIN, UNSPECIFIED CHRONICITY, UNSPECIFIED LATERALITY: ICD-10-CM

## 2017-12-14 DIAGNOSIS — M25.569 KNEE PAIN, UNSPECIFIED CHRONICITY, UNSPECIFIED LATERALITY: Primary | ICD-10-CM

## 2017-12-14 DIAGNOSIS — M94.261 CHONDROMALACIA OF RIGHT KNEE: ICD-10-CM

## 2017-12-14 PROCEDURE — 99999 PR PBB SHADOW E&M-EST. PATIENT-LVL III: CPT | Mod: PBBFAC,,, | Performed by: PHYSICIAN ASSISTANT

## 2017-12-14 PROCEDURE — 73564 X-RAY EXAM KNEE 4 OR MORE: CPT | Mod: TC,50,PO

## 2017-12-14 PROCEDURE — 73564 X-RAY EXAM KNEE 4 OR MORE: CPT | Mod: 26,LT,, | Performed by: RADIOLOGY

## 2017-12-14 PROCEDURE — 73564 X-RAY EXAM KNEE 4 OR MORE: CPT | Mod: 26,59,RT, | Performed by: RADIOLOGY

## 2017-12-14 PROCEDURE — 99204 OFFICE O/P NEW MOD 45 MIN: CPT | Mod: S$GLB,,, | Performed by: PHYSICIAN ASSISTANT

## 2017-12-14 RX ORDER — MELOXICAM 15 MG/1
15 TABLET ORAL DAILY
Qty: 30 TABLET | Refills: 2 | Status: SHIPPED | OUTPATIENT
Start: 2017-12-14 | End: 2018-01-13

## 2017-12-14 NOTE — PROGRESS NOTES
Subjective:          Chief Complaint: Mela Blanco is a 31 y.o. female who had concerns including Pain of the Right Knee.    HPI   Patient who is a  presents to clinic with right knee pain x 2-3 years, worsening over the last few months. Pain is worse with walking, standing., increased activity. She has been experiencing swelling in her right knee that comes and goes. Pain is located in the posterior knee and above patella. She reports increased crepitus in her knees. Pain at rest is 1-2/10. Pain with any activity is 8-9/10. She has been taking Ibuprofen 800mg  TID. + mechanical symptoms (catching) - instability      Review of Systems   Constitution: Negative. Negative for chills, fever, weight gain and weight loss.   HENT: Negative for congestion and sore throat.    Eyes: Negative for blurred vision and double vision.   Cardiovascular: Negative for chest pain, leg swelling and palpitations.   Respiratory: Negative for cough and shortness of breath.    Hematologic/Lymphatic: Does not bruise/bleed easily.   Skin: Negative for itching, poor wound healing and rash.   Musculoskeletal: Positive for joint pain and stiffness. Negative for back pain, joint swelling, muscle weakness and myalgias.   Gastrointestinal: Negative for abdominal pain, constipation, diarrhea, nausea and vomiting.   Genitourinary: Negative.  Negative for frequency and hematuria.   Neurological: Negative for dizziness, headaches, numbness, paresthesias and sensory change.   Psychiatric/Behavioral: Negative for altered mental status and depression. The patient is not nervous/anxious.    Allergic/Immunologic: Negative for hives.       Pain Related Questions  Over the past 3 days, what was your average pain during activity? (I.e. running, jogging, walking, climbing stairs, getting dressed, ect.): 9  Over the past 3 days, what was your highest pain level?: 9  Over the past 3 days, what was your lowest pain level? : 2    Other  How  many nights a week are you awakened by your affected body part?: 2  Was the patient's HEIGHT measured or patient reported?: Patient Reported  Was the patient's WEIGHT measured or patient reported?: Patient Reported      Objective:        General: Mela is well-developed, well-nourished, appears stated age, in no acute distress, alert and oriented to time, place and person.     General    Vitals reviewed.  Constitutional: She is oriented to person, place, and time. She appears well-developed and well-nourished. No distress.   HENT:   Head: Normocephalic and atraumatic.   Eyes: EOM are normal.   Neck: Normal range of motion.   Cardiovascular: Normal rate and regular rhythm.    Pulmonary/Chest: Effort normal. No respiratory distress.   Neurological: She is alert and oriented to person, place, and time. She has normal reflexes. No cranial nerve deficit. Coordination normal.   Psychiatric: She has a normal mood and affect. Her behavior is normal. Judgment and thought content normal.     General Musculoskeletal Exam   Gait: normal       Right Knee Exam     Inspection   Erythema: absent  Scars: absent  Swelling: absent  Effusion: present  Deformity: deformity  Bruising: absent    Tenderness   The patient is tender to palpation of the patella and lateral joint line.    Range of Motion   Extension:  0 normal   Flexion:  120 abnormal     Tests   Meniscus   Tracy:  Medial - negative Lateral - positive  Ligament Examination Lachman: normal (-1 to 2mm) PCL-Posterior Drawer: normal (0 to 2mm)     MCL - Valgus: normal (0 to 2mm)  LCL - Varus: normalPivot Shift: normal (Equal)Reverse Pivot Shift: normal (Equal)Dial Test at 30 degrees: normal (< 5 degrees)Dial Test at 90 degrees: normal (< 5 degrees)  Posterolateral Corner: unstable (>15 degrees difference)  Patella   Passive Patellar Tilt: neutral  Patellar Glide (quadrants): Lateral - 1   Medial - 2  Patellar Grind: positive    Other   Sensation: normal    Left Knee Exam      Inspection   Erythema: absent  Scars: absent  Swelling: absent  Effusion: absent  Deformity: deformity  Bruising: absent    Tenderness   The patient is experiencing no tenderness.         Crepitus   The patient has crepitus of the patella.    Range of Motion   Extension:  0 normal   Flexion:  140 normal     Tests   Meniscus   Tracy:  Medial - negative Lateral - negative  Stability Lachman: normal (-1 to 2mm) PCL-Posterior Drawer: normal (0 to 2mm)  MCL - Valgus: normal (0 to 2mm)  LCL - Varus: normal (0 to 2mm)Pivot Shift: normal (Equal)Reverse Pivot Shift: normal (Equal)Dial Test at 30 degrees: normal (< 5 degrees)Dial Test at 90 degrees: normal (< 5 degrees)  Posterolateral Corner: unstable (>15 degrees difference)  Patella   Passive Patellar Tilt: neutral  Patellar Glide (Quadrants): Lateral - 1 Medial - 2  Patellar Grind: negative    Other   Sensation: normal    Muscle Strength   Right Lower Extremity   Hip Abduction: 5/5   Quadriceps:  5/5   Hamstrin/5   Left Lower Extremity   Hip Abduction: 5/5   Quadriceps:  5/5   Hamstrin/5     Reflexes     Left Side  Quadriceps:  2+  Achilles:  2+    Right Side   Quadriceps:  2+  Achilles:  2+    Vascular Exam     Right Pulses  Dorsalis Pedis:      2+  Posterior Tibial:      2+        Left Pulses  Dorsalis Pedis:      2+  Posterior Tibial:      2+          RADIOGRAPHS:  Bilateral knee exam was performed with these views -- right and left laterals, AP standing both knees, flexion standing both knees, and Merchant both knees.  Comparison -- 10/6/17. Skeletal structures are intact with satisfactory overall alignment, noting slight lateral subluxation of right patella. The joint spaces are preserved without significant narrowing. Articular surfaces are smooth. A 4 mm calcification projects in the left patellofemoral joint space, possible loose body, though no osteochondral defect is detected. No joint effusion is seen.        Assessment:       Encounter  Diagnoses   Name Primary?    Knee pain, unspecified chronicity, unspecified laterality Yes    Chondromalacia of right knee           Plan:       1. I made the decision to obtain old records of the patient including previous notes and imaging. New imaging was ordered today of the extremity or extremities evaluated. I independently reviewed and interpreted the radiographs today as well as prior imaging.    2. The total face-to-face encounter time with this patient was 60 minutes and greater than 50% of of the encounter time was spent counseling the patient and coordinating care. Significant time was also spent educating the patient, giving detail post-visit instructions, and discussing risks and benefits of treatment. Patient also had several specific questions that were answered during the visit today.     3. Discontinue Ibuprofren. Start Mobic 15 mg once a day    4. Note provided for work for shifts no longer than 6 hours at a time     5. MRI of right knee to evaluate lateral meniscus and patella chondromalacia    6. RTC in 1 week with Leandra Patrick PA-C for MRI results review.                    Patient questionnaires may have been collected.

## 2017-12-14 NOTE — LETTER
Patient: Mela Blanco   YOB: 1985   Clinic Number: [id number]   Today's Date: December 14, 2017        Certificate to Return to Work     Mela Morris was seen by Leandra Patrick PA-C on 12/14/2017.    Mela Morris can return to work on 12/15/2017 with full duty.    Specific restrictions: She cannot work shifts longer than 6 hours at this time due to right knee pain.    If you have any questions or concerns, please feel free to contact the office at [phone].    Thank you.    Leandra Patrick PA-C        Signature: __________________________________________________

## 2017-12-14 NOTE — TELEPHONE ENCOUNTER
----- Message from Minh Gutierrez sent at 12/14/2017  8:46 AM CST -----  Contact: patient  FST Request    x 1st Request   _ 2nd Request   _ 3rd Request     Who: ANGELLA VELAZCO [[id number]]    Why: Patient of Dr Agrawal called is requesting to be seen today.  Patient would like to get injections in her wrists.    What Number to Call Back: [phone]    When to Expect a call back: (Before the end of the day)   -- if call after 3:00 call back will be tomorrow.

## 2017-12-15 ENCOUNTER — OFFICE VISIT (OUTPATIENT)
Dept: ORTHOPEDICS | Facility: CLINIC | Age: 32
End: 2017-12-15
Payer: COMMERCIAL

## 2017-12-15 VITALS
BODY MASS INDEX: 34.73 KG/M2 | DIASTOLIC BLOOD PRESSURE: 80 MMHG | WEIGHT: 196 LBS | SYSTOLIC BLOOD PRESSURE: 115 MMHG | HEIGHT: 63 IN | HEART RATE: 99 BPM

## 2017-12-15 DIAGNOSIS — G56.03 BILATERAL CARPAL TUNNEL SYNDROME: Primary | ICD-10-CM

## 2017-12-15 DIAGNOSIS — G56.02 LEFT CARPAL TUNNEL SYNDROME: ICD-10-CM

## 2017-12-15 PROCEDURE — 99213 OFFICE O/P EST LOW 20 MIN: CPT | Mod: 25,S$GLB,, | Performed by: PLASTIC SURGERY

## 2017-12-15 PROCEDURE — 20526 THER INJECTION CARP TUNNEL: CPT | Mod: LT,S$GLB,, | Performed by: PLASTIC SURGERY

## 2017-12-15 PROCEDURE — 99999 PR PBB SHADOW E&M-EST. PATIENT-LVL III: CPT | Mod: PBBFAC,,, | Performed by: PLASTIC SURGERY

## 2017-12-15 RX ORDER — TRIAMCINOLONE ACETONIDE 40 MG/ML
40 INJECTION, SUSPENSION INTRA-ARTICULAR; INTRAMUSCULAR
Status: COMPLETED | OUTPATIENT
Start: 2017-12-15 | End: 2017-12-15

## 2017-12-15 RX ORDER — SODIUM CHLORIDE 9 MG/ML
INJECTION, SOLUTION INTRAVENOUS CONTINUOUS
Status: CANCELLED | OUTPATIENT
Start: 2017-12-15

## 2017-12-15 RX ORDER — LIDOCAINE HYDROCHLORIDE 10 MG/ML
1 INJECTION INFILTRATION; PERINEURAL
Status: COMPLETED | OUTPATIENT
Start: 2017-12-15 | End: 2017-12-15

## 2017-12-15 RX ADMIN — LIDOCAINE HYDROCHLORIDE 1 ML: 10 INJECTION INFILTRATION; PERINEURAL at 04:12

## 2017-12-15 RX ADMIN — TRIAMCINOLONE ACETONIDE 40 MG: 40 INJECTION, SUSPENSION INTRA-ARTICULAR; INTRAMUSCULAR at 04:12

## 2017-12-15 NOTE — PROGRESS NOTES
HISTORY OF PRESENT ILLNESS:  Ms. Blanco returns today due to increasing   numbness and tingling within the left hand.  The patient last received her   injection to the left carpal tunnel in August of this year.  She states that she   is also experiencing pain that radiates up into the forearm and arm.  She   denies any other significant complaints and has no other complaints today.  She   would like to pursue surgery in early January if possible and she would also   like to pursue bilateral carpal tunnel release at the same time due to her   limited availability of time off work.    Past Medical History:   Diagnosis Date    Carpal tunnel syndrome     Miscarriage        Past Surgical History:   Procedure Laterality Date    breast reduction      hematoma surgery      LAPAROSCOPIC GASTRIC BANDING         Social History     Social History    Marital status: Single     Spouse name: N/A    Number of children: 1    Years of education: N/A     Occupational History    Not on file.     Social History Main Topics    Smoking status: Never Smoker    Smokeless tobacco: Never Used    Alcohol use Yes      Comment: occasional.    Drug use: No    Sexual activity: Not on file     Other Topics Concern    Not on file     Social History Narrative    No narrative on file       Current Outpatient Prescriptions on File Prior to Visit   Medication Sig Dispense Refill    cetirizine (ZYRTEC) 10 MG tablet Take 1 tablet (10 mg total) by mouth once daily. For allergies. 30 tablet 11    ibuprofen (ADVIL,MOTRIN) 600 MG tablet Take 1 tablet (600 mg total) by mouth every 8 (eight) hours as needed for Pain. 40 tablet 5    meloxicam (MOBIC) 15 MG tablet Take 1 tablet (15 mg total) by mouth once daily. 30 tablet 2    ranitidine (ZANTAC) 150 MG capsule Take 1 capsule (150 mg total) by mouth 2 (two) times daily as needed for Heartburn. 60 capsule 11     No current facility-administered medications on file prior to visit.   "      Review of patient's allergies indicates:  No Known Allergies    Review of Systems:  Constitutional: no fever or chills  ENT: no nasal congestion or sore throat  Respiratory: no cough or shortness of breath  Cardiovascular: no chest pain or palpitations  Gastrointestinal: no nausea or vomiting  Genitourinary: no hematuria or dysuria  Integument/Breast: no rash or pruritis  Hematologic/Lymphatic: no easy bruising or lymphadenopathy  Musculoskeletal: see HPI  Neurological: no seizures or tremors  Behavioral/Psych: no auditory or visual hallucinations      PHYSICAL EXAM    Vitals:    12/15/17 1515   BP: 115/80   Pulse: 99   Weight: 88.9 kg (196 lb)   Height: 5' 3" (1.6 m)   PainSc:   2   PainLoc: Wrist       PHYSICAL EXAMINATION:  GENERAL:  In no acute distress, alert and oriented x3, cooperative, well   nourished.  LUNGS:  Nonlabored, on room air.  CARDIOVASCULAR:  Distal pulses are intact.  Good capillary refill.  No clubbing,   cyanosis or edema.  EXTREMITIES:  Left upper extremity, positive Tinel's and Durkan sign over the   carpal tunnel.  She has tenderness to the lateral epicondyle to deep palpation.    She has full active range of motion of all digits in all joints.  She is   neurovascularly intact in the median, radial and ulnar distributions.    I have explained the risks, benefits, and alternatives of the procedure in detail.  The patient voices understanding and all questions have been answered.  The patient agrees to proceed as planned. After a sterile prep of the skin the left carpal tunnel is injected from the volar approach using a 25 gauge needle with a combination of 1cc 1% plain xylocaine and 40 mg of Kenalog.  The patient is cautioned and immediate relief of pain is secondary to the local anesthetic and will be temporary.  After the anesthetic wears off there may be a increase in pain that may last for a few hours or a few days and they should use ice to help alleviate this flair up of pain. "       ASSESSMENT:  1.  Lateral epicondylitis of the left elbow.  2.  Bilateral carpal tunnel syndrome.    PLAN:  The patient was offered a repeat injection to the left carpal tunnel.  We   discussed performing a bilateral carpal tunnel release on 01/12/2018.  I   discussed risks, benefits and alternatives with the patient in detail.  I   further discussed that it would be difficult for her postoperatively due to   incapacitation of both hands.  The patient understood this.  She states that she   will have help at home and would like to pursue this due to her limited time   off work; therefore, she is scheduled for bilateral carpal tunnel syndrome.  I   started midlateral carpal tunnel release on 01/12/2017.      DELANEY/SOLOMON  dd: 12/15/2017 16:27:12 (CST)  td: 12/16/2017 15:09:14 (CST)  Doc ID   #[id number]  Job ID #[id number]    CC:       Dictation Confirmation Code: [id number]  Michael Agrawal Jr. MD  12/15/2017  4:27 PM

## 2017-12-18 ENCOUNTER — HOSPITAL ENCOUNTER (OUTPATIENT)
Dept: RADIOLOGY | Facility: HOSPITAL | Age: 32
Discharge: HOME | End: 2017-12-18
Attending: PHYSICIAN ASSISTANT
Payer: COMMERCIAL

## 2017-12-18 DIAGNOSIS — M94.261 CHONDROMALACIA OF RIGHT KNEE: ICD-10-CM

## 2017-12-18 PROCEDURE — 73721 MRI JNT OF LWR EXTRE W/O DYE: CPT | Mod: 26,RT,, | Performed by: RADIOLOGY

## 2017-12-18 PROCEDURE — 73721 MRI JNT OF LWR EXTRE W/O DYE: CPT | Mod: TC,RT

## 2017-12-21 ENCOUNTER — OFFICE VISIT (OUTPATIENT)
Dept: SPORTS MEDICINE | Facility: CLINIC | Age: 32
End: 2017-12-21
Payer: COMMERCIAL

## 2017-12-21 VITALS
WEIGHT: 196 LBS | HEART RATE: 85 BPM | HEIGHT: 63 IN | DIASTOLIC BLOOD PRESSURE: 85 MMHG | BODY MASS INDEX: 34.73 KG/M2 | SYSTOLIC BLOOD PRESSURE: 123 MMHG

## 2017-12-21 DIAGNOSIS — G89.29 CHRONIC PAIN OF RIGHT KNEE: Primary | ICD-10-CM

## 2017-12-21 DIAGNOSIS — M25.561 CHRONIC PAIN OF RIGHT KNEE: Primary | ICD-10-CM

## 2017-12-21 PROCEDURE — 99999 PR PBB SHADOW E&M-EST. PATIENT-LVL III: CPT | Mod: PBBFAC,,, | Performed by: PHYSICIAN ASSISTANT

## 2017-12-21 PROCEDURE — 99214 OFFICE O/P EST MOD 30 MIN: CPT | Mod: S$GLB,,, | Performed by: PHYSICIAN ASSISTANT

## 2017-12-21 NOTE — LETTER
Patient: Mela Blanco   YOB: 1985   Clinic Number: [id number]   Today's Date: December 21, 2017        Certificate to Return to Work     Mela Morris was seen by Leandra Patrick PA-C on 12/21/2017.    She has a right knee partial ACL tear and lateral subluxation of her patella.  Please allow her to attend physical therapy 1-2 days a week for approximately 6-8 weeks.    She can continue full duty for 6 hours a day.       If you have any questions or concerns, please feel free to contact the office at [phone].    Thank you.    Leandra Patrick PA-C        Signature: __________________________________________________

## 2017-12-21 NOTE — PROGRESS NOTES
Subjective:          Chief Complaint: Mela Blanco is a 32 y.o. female who had concerns including Follow-up of the Right Knee and Follow-up (MRI results ).    HPI   Patient who is a  presents to clinic for MRI results review of right knee. She has had right knee pain x 2-3 years, worsening over the last few months. Pain is worse with walking, standing., increased activity. She has been experiencing swelling in her right knee that comes and goes. Pain is located in the posterior knee and above patella. She reports increased crepitus in her knees. Pain at rest is 1-2/10. Pain with any activity is 8-9/10. She has been taking Ibuprofen 800mg  TID. + mechanical symptoms (catching) - instability      Review of Systems   Constitution: Negative. Negative for chills, fever, weight gain and weight loss.   HENT: Negative for congestion and sore throat.    Eyes: Negative for blurred vision and double vision.   Cardiovascular: Negative for chest pain, leg swelling and palpitations.   Respiratory: Negative for cough and shortness of breath.    Hematologic/Lymphatic: Does not bruise/bleed easily.   Skin: Negative for itching, poor wound healing and rash.   Musculoskeletal: Positive for joint pain and stiffness. Negative for back pain, joint swelling, muscle weakness and myalgias.   Gastrointestinal: Negative for abdominal pain, constipation, diarrhea, nausea and vomiting.   Genitourinary: Negative.  Negative for frequency and hematuria.   Neurological: Negative for dizziness, headaches, numbness, paresthesias and sensory change.   Psychiatric/Behavioral: Negative for altered mental status and depression. The patient is not nervous/anxious.    Allergic/Immunologic: Negative for hives.       Pain Related Questions  Over the past 3 days, what was your average pain during activity? (I.e. running, jogging, walking, climbing stairs, getting dressed, ect.): 5  Over the past 3 days, what was your highest pain level?:  5  Over the past 3 days, what was your lowest pain level? : 1    Other  How many nights a week are you awakened by your affected body part?: 0  Was the patient's HEIGHT measured or patient reported?: Patient Reported  Was the patient's WEIGHT measured or patient reported?: Measured      Objective:        General: Mela is well-developed, well-nourished, appears stated age, in no acute distress, alert and oriented to time, place and person.     General    Vitals reviewed.  Constitutional: She is oriented to person, place, and time. She appears well-developed and well-nourished. No distress.   HENT:   Head: Normocephalic and atraumatic.   Eyes: EOM are normal.   Neck: Normal range of motion.   Cardiovascular: Normal rate and regular rhythm.    Pulmonary/Chest: Effort normal. No respiratory distress.   Neurological: She is alert and oriented to person, place, and time. She has normal reflexes. No cranial nerve deficit. Coordination normal.   Psychiatric: She has a normal mood and affect. Her behavior is normal. Judgment and thought content normal.     General Musculoskeletal Exam   Gait: normal       Right Knee Exam     Inspection   Erythema: absent  Scars: absent  Swelling: absent  Effusion: present  Deformity: deformity  Bruising: absent    Tenderness   The patient is tender to palpation of the patella and lateral joint line.    Range of Motion   Extension:  0 normal   Flexion:  120 abnormal     Tests   Meniscus   Tracy:  Medial - negative Lateral - positive  Ligament Examination Lachman: normal (-1 to 2mm) PCL-Posterior Drawer: normal (0 to 2mm)     MCL - Valgus: normal (0 to 2mm)  LCL - Varus: normalPivot Shift: normal (Equal)Reverse Pivot Shift: normal (Equal)Dial Test at 30 degrees: normal (< 5 degrees)Dial Test at 90 degrees: normal (< 5 degrees)  Posterolateral Corner: unstable (>15 degrees difference)  Patella   Passive Patellar Tilt: neutral  Patellar Glide (quadrants): Lateral - 1   Medial -  2  Patellar Grind: positive    Other   Sensation: normal    Left Knee Exam     Inspection   Erythema: absent  Scars: absent  Swelling: absent  Effusion: absent  Deformity: deformity  Bruising: absent    Tenderness   The patient is experiencing no tenderness.         Crepitus   The patient has crepitus of the patella.    Range of Motion   Extension:  0 normal   Flexion:  140 normal     Tests   Meniscus   Tracy:  Medial - negative Lateral - negative  Stability Lachman: normal (-1 to 2mm) PCL-Posterior Drawer: normal (0 to 2mm)  MCL - Valgus: normal (0 to 2mm)  LCL - Varus: normal (0 to 2mm)Pivot Shift: normal (Equal)Reverse Pivot Shift: normal (Equal)Dial Test at 30 degrees: normal (< 5 degrees)Dial Test at 90 degrees: normal (< 5 degrees)  Posterolateral Corner: unstable (>15 degrees difference)  Patella   Passive Patellar Tilt: neutral  Patellar Glide (Quadrants): Lateral - 1 Medial - 2  Patellar Grind: negative    Other   Sensation: normal    Muscle Strength   Right Lower Extremity   Hip Abduction: 5/5   Quadriceps:  5/5   Hamstrin/5   Left Lower Extremity   Hip Abduction: 5/5   Quadriceps:  5/5   Hamstrin/5     Reflexes     Left Side  Quadriceps:  2+  Achilles:  2+    Right Side   Quadriceps:  2+  Achilles:  2+    Vascular Exam     Right Pulses  Dorsalis Pedis:      2+  Posterior Tibial:      2+        Left Pulses  Dorsalis Pedis:      2+  Posterior Tibial:      2+          RADIOGRAPHS:  Bilateral knee exam was performed with these views -- right and left laterals, AP standing both knees, flexion standing both knees, and Merchant both knees.  Comparison -- 10/6/17. Skeletal structures are intact with satisfactory overall alignment, noting slight lateral subluxation of right patella. The joint spaces are preserved without significant narrowing. Articular surfaces are smooth. A 4 mm calcification projects in the left patellofemoral joint space, possible loose body, though no osteochondral defect is  detected. No joint effusion is seen.    MRI results right knee:  Findings:     Menisci:  No signal abnormality to suggest meniscal tear.     Cruciate ligaments: Abnormally thickened ACL with increased T2 signal without evidence of tendon disruption.  Small intracondylar cyst near the femoral attachment along the posterior femoral condyle and associated moderate degree of bone marrow edema within the tibial plateau. PCL is intact.     Collateral ligaments: The medial and lateral collateral ligaments are intact.    Extensor mechanism: No tear.  Patellar and quadriceps tendons are intact.    Cartilage: Mild fissuring of the cartilage within the weightbearing aspect of the medial tibial femoral compartment and mild heterogeneous T2 signal within the lateral patellofemoral compartment. No full thickness cartilaginous defect.    Marrow: No additional bone marrow edema. No fractures.    Joint fluid and synovium: Small suprapatellar joint effusion.  No synovial abnormality.    Other: Mild lateral subluxation of the patella.  No evidence of injury to the medial patellar retinaculum or MPFL, noting mild soft tissue edema signal along the lateral aspect of the inferior patellar pole.  No definite trochlear dysplasia.        Assessment:       Encounter Diagnosis   Name Primary?    Chronic pain of right knee Yes          Plan:       1. I made the decision to obtain old records of the patient including previous notes and imaging. New imaging was ordered today of the extremity or extremities evaluated. I independently reviewed and interpreted the radiographs and MRI today as well as prior imaging. Reviewed MRI with patient in great detail and discussed possible treatment options including HEP, formal PT, bracing, NSAIDS, and knee arthroscopy with possible ACL reconstruction if unstable    2. Discontinue Mobic as it was not effective. Continue Ibuprofen as needed for pain    3. Note provided for work for shifts no longer than 6  hours at a time    4. Referral to formal PT at [facility] -referral placed    5. [id number] - Quentin Vanegas, performed a custom orthotic / brace adjustment, fitting and training with the patient. The patient demonstrated understanding and proper care. This was performed for 15 minutes. Placed in viscoskin    6. RTC in 6-8 weeks with Leandra Patrick PA-C for follow up right knee                      Patient questionnaires may have been collected.

## 2018-01-10 ENCOUNTER — ANESTHESIA EVENT (OUTPATIENT)
Dept: SURGERY | Facility: OTHER | Age: 33
End: 2018-01-10
Payer: COMMERCIAL

## 2018-01-10 ENCOUNTER — HOSPITAL ENCOUNTER (OUTPATIENT)
Dept: PREADMISSION TESTING | Facility: OTHER | Age: 33
Discharge: HOME | End: 2018-01-10
Attending: PLASTIC SURGERY
Payer: COMMERCIAL

## 2018-01-10 VITALS
SYSTOLIC BLOOD PRESSURE: 126 MMHG | HEIGHT: 64 IN | BODY MASS INDEX: 31.58 KG/M2 | WEIGHT: 185 LBS | DIASTOLIC BLOOD PRESSURE: 81 MMHG | HEART RATE: 81 BPM | TEMPERATURE: 99 F | OXYGEN SATURATION: 100 %

## 2018-01-10 RX ORDER — PHENTERMINE HYDROCHLORIDE 37.5 MG/1
37.5 CAPSULE ORAL EVERY MORNING
COMMUNITY
End: 2018-03-23 | Stop reason: SDUPTHER

## 2018-01-10 RX ORDER — CAFFEINE 200 MG
200 TABLET ORAL EVERY 4 HOURS PRN
COMMUNITY

## 2018-01-10 RX ORDER — SODIUM CHLORIDE, SODIUM LACTATE, POTASSIUM CHLORIDE, CALCIUM CHLORIDE 600; 310; 30; 20 MG/100ML; MG/100ML; MG/100ML; MG/100ML
INJECTION, SOLUTION INTRAVENOUS CONTINUOUS
Status: CANCELLED | OUTPATIENT
Start: 2018-01-10

## 2018-01-10 RX ORDER — LIDOCAINE HYDROCHLORIDE 10 MG/ML
0.5 INJECTION, SOLUTION EPIDURAL; INFILTRATION; INTRACAUDAL; PERINEURAL ONCE
Status: CANCELLED | OUTPATIENT
Start: 2018-01-10 | End: 2018-01-10

## 2018-01-10 NOTE — ANESTHESIA PREPROCEDURE EVALUATION
01/10/2018  Mela Blanco is a 32 y.o., female.    Anesthesia Evaluation    I have reviewed the Patient Summary Reports.    I have reviewed the Nursing Notes.   I have reviewed the Medications.     Review of Systems  Anesthesia Hx:  Denies Family Hx of Anesthesia complications.   Denies Personal Hx of Anesthesia complications.   Social:  Non-Smoker    Hematology/Oncology:  Hematology Normal   Oncology Normal     EENT/Dental:EENT/Dental Normal   Cardiovascular:  Cardiovascular Normal     Pulmonary:  Pulmonary Normal    Renal/:  Renal/ Normal     Hepatic/GI:  Hepatic/GI Normal Gastric band, lost over 100 lb    Takes phentermine, will have been off for 5 days   Musculoskeletal:  Musculoskeletal Normal    Neurological:   Neuromuscular Disease, (keith CTS)    Endocrine:  Endocrine Normal    Dermatological:  Skin Normal    Psych:  Psychiatric Normal           Physical Exam  General:  Obesity      Dental:  Dental Findings: In tact        Mental Status:  Mental Status Findings:  Cooperative, Alert and Oriented         Anesthesia Plan  Type of Anesthesia, risks & benefits discussed:  Anesthesia Type:  general  Patient's Preference:   Intra-op Monitoring Plan: standard ASA monitors  Intra-op Monitoring Plan Comments:   Post Op Pain Control Plan:   Post Op Pain Control Plan Comments:   Induction:   IV  Beta Blocker:         Informed Consent: Patient understands risks and agrees with Anesthesia plan.  Questions answered. Anesthesia consent signed with patient.  ASA Score: 2     Day of Surgery Review of History & Physical:    H&P update referred to the surgeon.     Anesthesia Plan Notes: IV propofol for local injection by surgeon          Ready For Surgery From Anesthesia Perspective.

## 2018-01-10 NOTE — DISCHARGE INSTRUCTIONS
PRE-ADMIT TESTING -  [phone]    [address]  [place] [facility]          Your surgery has been scheduled at [facility]. We are pleased to have the opportunity to serve you. For Further Information please call [phone].    On the day of surgery please report to the Information Desk on the 1st floor.    · CONTACT YOUR PHYSICIAN'S OFFICE THE DAY PRIOR TO YOUR SURGERY TO OBTAIN YOUR ARRIVAL TIME.     · The evening before surgery do not eat anything after 9 p.m. ( this includes hard candy, chewing gum and mints).  You may only have GATORADE, POWERADE AND WATER  from 9 p.m. until you leave your home.   DO NOT DRINK ANY LIQUIDS ON THE WAY TO THE HOSPITAL.      SPECIAL MEDICATION INSTRUCTIONS: TAKE medications checked off by the Anesthesiologist on your Medication List.    Angiogram Patients: Take medications as instructed by your physician, including aspirin.     Surgery Patients:    If you take ASPIRIN - Your PHYSICIAN/SURGEON will need to inform you IF/OR when you need to stop taking aspirin prior to your surgery.     Do Not take any medications containing IBUPROFEN.  Do Not Wear any make-up or dark nail polish   (especially eye make-up) to surgery. If you come to surgery with makeup on you will be required to remove the makeup or nail polish.    Do not shave your surgical area at least 5 days prior to your surgery. The surgical prep will be performed at the hospital according to Infection Control regulations.    Leave all valuables at home.   Do Not wear any jewelry or watches, including any metal in body piercings.  Contact Lens must be removed before surgery. Either do not wear the contact lens or bring a case and solution for storage.  Please bring a container for eyeglasses or dentures as required.  Bring any paperwork your physician has provided, such as consent forms,  history and physicals, doctor's orders, etc.   Bring comfortable clothes that are loose fitting to wear upon  discharge. Take into consideration the type of surgery being performed.  Maintain your diet as advised per your physician the day prior to surgery.      Adequate rest the night before surgery is advised.   Park in the Parking lot behind the hospital or in the [facility] across the street from the parking lot. Parking is complimentary.  If you will be discharged the same day as your procedure, please arrange for a responsible adult to drive you home or to accompany you if traveling by taxi.   YOU WILL NOT BE PERMITTED TO DRIVE OR TO LEAVE THE HOSPITAL ALONE AFTER SURGERY.   It is strongly recommended that you arrange for someone to remain with you for the first 24 hrs following your surgery.       Thank you for your cooperation.  The Staff of [facility].        Bathing Instructions                                                                 Please shower the evening before and morning of your procedure with    ANTIBACTERIAL SOAP. ( DIAL, etc )  Concentrate on the surgical area   for at least 3 minutes and rinse completely. Dry off as usual.   Do not use any deodorant, powder, body lotions, perfume, after shave or    cologne.

## 2018-01-11 ENCOUNTER — TELEPHONE (OUTPATIENT)
Dept: ORTHOPEDICS | Facility: CLINIC | Age: 33
End: 2018-01-11

## 2018-01-12 ENCOUNTER — HOSPITAL ENCOUNTER (OUTPATIENT)
Facility: OTHER | Age: 33
Discharge: HOME | End: 2018-01-12
Attending: PLASTIC SURGERY | Admitting: PLASTIC SURGERY
Payer: COMMERCIAL

## 2018-01-12 ENCOUNTER — SURGERY (OUTPATIENT)
Age: 33
End: 2018-01-12

## 2018-01-12 ENCOUNTER — ANESTHESIA (OUTPATIENT)
Dept: SURGERY | Facility: OTHER | Age: 33
End: 2018-01-12
Payer: COMMERCIAL

## 2018-01-12 VITALS
OXYGEN SATURATION: 100 % | WEIGHT: 185 LBS | TEMPERATURE: 99 F | DIASTOLIC BLOOD PRESSURE: 61 MMHG | HEART RATE: 88 BPM | RESPIRATION RATE: 16 BRPM | HEIGHT: 64 IN | SYSTOLIC BLOOD PRESSURE: 111 MMHG | BODY MASS INDEX: 31.58 KG/M2

## 2018-01-12 DIAGNOSIS — G56.03 BILATERAL CARPAL TUNNEL SYNDROME: Primary | ICD-10-CM

## 2018-01-12 LAB
B-HCG UR QL: NEGATIVE
CTP QC/QA: YES

## 2018-01-12 PROCEDURE — 36000707: Performed by: PLASTIC SURGERY

## 2018-01-12 PROCEDURE — 71000015 HC POSTOP RECOV 1ST HR: Performed by: PLASTIC SURGERY

## 2018-01-12 PROCEDURE — 25000003 PHARM REV CODE 250: Performed by: ANESTHESIOLOGY

## 2018-01-12 PROCEDURE — 63600175 PHARM REV CODE 636 W HCPCS: Performed by: PLASTIC SURGERY

## 2018-01-12 PROCEDURE — 64721 CARPAL TUNNEL SURGERY: CPT | Mod: RT,,, | Performed by: PLASTIC SURGERY

## 2018-01-12 PROCEDURE — 36000706: Performed by: PLASTIC SURGERY

## 2018-01-12 PROCEDURE — 63600175 PHARM REV CODE 636 W HCPCS: Performed by: NURSE ANESTHETIST, CERTIFIED REGISTERED

## 2018-01-12 PROCEDURE — 37000009 HC ANESTHESIA EA ADD 15 MINS: Performed by: PLASTIC SURGERY

## 2018-01-12 PROCEDURE — 71000016 HC POSTOP RECOV ADDL HR: Performed by: PLASTIC SURGERY

## 2018-01-12 PROCEDURE — 81025 URINE PREGNANCY TEST: CPT | Performed by: ANESTHESIOLOGY

## 2018-01-12 PROCEDURE — 25000003 PHARM REV CODE 250: Performed by: PLASTIC SURGERY

## 2018-01-12 PROCEDURE — 37000008 HC ANESTHESIA 1ST 15 MINUTES: Performed by: PLASTIC SURGERY

## 2018-01-12 RX ORDER — PROPOFOL 10 MG/ML
VIAL (ML) INTRAVENOUS
Status: DISCONTINUED | OUTPATIENT
Start: 2018-01-12 | End: 2018-01-12

## 2018-01-12 RX ORDER — FENTANYL CITRATE 50 UG/ML
25 INJECTION, SOLUTION INTRAMUSCULAR; INTRAVENOUS EVERY 5 MIN PRN
Status: DISCONTINUED | OUTPATIENT
Start: 2018-01-12 | End: 2018-01-12 | Stop reason: HOSPADM

## 2018-01-12 RX ORDER — HYDROCODONE BITARTRATE AND ACETAMINOPHEN 5; 325 MG/1; MG/1
1 TABLET ORAL EVERY 4 HOURS PRN
Status: DISCONTINUED | OUTPATIENT
Start: 2018-01-12 | End: 2018-01-12 | Stop reason: HOSPADM

## 2018-01-12 RX ORDER — BUPIVACAINE HYDROCHLORIDE 2.5 MG/ML
INJECTION, SOLUTION EPIDURAL; INFILTRATION; INTRACAUDAL
Status: DISCONTINUED | OUTPATIENT
Start: 2018-01-12 | End: 2018-01-12 | Stop reason: HOSPADM

## 2018-01-12 RX ORDER — LIDOCAINE HYDROCHLORIDE 10 MG/ML
0.5 INJECTION, SOLUTION EPIDURAL; INFILTRATION; INTRACAUDAL; PERINEURAL ONCE
Status: DISCONTINUED | OUTPATIENT
Start: 2018-01-12 | End: 2018-01-12 | Stop reason: HOSPADM

## 2018-01-12 RX ORDER — SODIUM CHLORIDE 0.9 % (FLUSH) 0.9 %
5 SYRINGE (ML) INJECTION
Status: DISCONTINUED | OUTPATIENT
Start: 2018-01-12 | End: 2018-01-12 | Stop reason: HOSPADM

## 2018-01-12 RX ORDER — OXYCODONE HYDROCHLORIDE 5 MG/1
5 TABLET ORAL
Status: DISCONTINUED | OUTPATIENT
Start: 2018-01-12 | End: 2018-01-12 | Stop reason: HOSPADM

## 2018-01-12 RX ORDER — PROPOFOL 10 MG/ML
VIAL (ML) INTRAVENOUS CONTINUOUS PRN
Status: DISCONTINUED | OUTPATIENT
Start: 2018-01-12 | End: 2018-01-12

## 2018-01-12 RX ORDER — SODIUM CHLORIDE 9 MG/ML
INJECTION, SOLUTION INTRAVENOUS CONTINUOUS
Status: DISCONTINUED | OUTPATIENT
Start: 2018-01-12 | End: 2018-01-12 | Stop reason: HOSPADM

## 2018-01-12 RX ORDER — ONDANSETRON 2 MG/ML
4 INJECTION INTRAMUSCULAR; INTRAVENOUS DAILY PRN
Status: DISCONTINUED | OUTPATIENT
Start: 2018-01-12 | End: 2018-01-12 | Stop reason: HOSPADM

## 2018-01-12 RX ORDER — SODIUM CHLORIDE, SODIUM LACTATE, POTASSIUM CHLORIDE, CALCIUM CHLORIDE 600; 310; 30; 20 MG/100ML; MG/100ML; MG/100ML; MG/100ML
INJECTION, SOLUTION INTRAVENOUS CONTINUOUS
Status: DISCONTINUED | OUTPATIENT
Start: 2018-01-12 | End: 2018-01-12 | Stop reason: HOSPADM

## 2018-01-12 RX ORDER — CEFAZOLIN SODIUM 2 G/50ML
2 SOLUTION INTRAVENOUS
Status: COMPLETED | OUTPATIENT
Start: 2018-01-12 | End: 2018-01-12

## 2018-01-12 RX ORDER — FENTANYL CITRATE 50 UG/ML
INJECTION, SOLUTION INTRAMUSCULAR; INTRAVENOUS
Status: DISCONTINUED | OUTPATIENT
Start: 2018-01-12 | End: 2018-01-12

## 2018-01-12 RX ORDER — LIDOCAINE HCL/PF 100 MG/5ML
SYRINGE (ML) INTRAVENOUS
Status: DISCONTINUED | OUTPATIENT
Start: 2018-01-12 | End: 2018-01-12

## 2018-01-12 RX ORDER — LIDOCAINE HYDROCHLORIDE AND EPINEPHRINE 20; 10 MG/ML; UG/ML
INJECTION, SOLUTION INFILTRATION; PERINEURAL
Status: DISCONTINUED | OUTPATIENT
Start: 2018-01-12 | End: 2018-01-12 | Stop reason: HOSPADM

## 2018-01-12 RX ORDER — SODIUM CHLORIDE 0.9 % (FLUSH) 0.9 %
3 SYRINGE (ML) INJECTION
Status: DISCONTINUED | OUTPATIENT
Start: 2018-01-12 | End: 2018-01-12 | Stop reason: HOSPADM

## 2018-01-12 RX ORDER — MEPERIDINE HYDROCHLORIDE 50 MG/ML
12.5 INJECTION INTRAMUSCULAR; INTRAVENOUS; SUBCUTANEOUS ONCE AS NEEDED
Status: DISCONTINUED | OUTPATIENT
Start: 2018-01-12 | End: 2018-01-12 | Stop reason: HOSPADM

## 2018-01-12 RX ORDER — HYDROCODONE BITARTRATE AND ACETAMINOPHEN 5; 325 MG/1; MG/1
1 TABLET ORAL EVERY 6 HOURS PRN
Qty: 30 TABLET | Refills: 0 | Status: SHIPPED | OUTPATIENT
Start: 2018-01-12 | End: 2018-01-19 | Stop reason: SDUPTHER

## 2018-01-12 RX ORDER — HYDROMORPHONE HYDROCHLORIDE 2 MG/ML
0.4 INJECTION, SOLUTION INTRAMUSCULAR; INTRAVENOUS; SUBCUTANEOUS EVERY 5 MIN PRN
Status: DISCONTINUED | OUTPATIENT
Start: 2018-01-12 | End: 2018-01-12 | Stop reason: HOSPADM

## 2018-01-12 RX ORDER — MIDAZOLAM HYDROCHLORIDE 1 MG/ML
INJECTION INTRAMUSCULAR; INTRAVENOUS
Status: DISCONTINUED | OUTPATIENT
Start: 2018-01-12 | End: 2018-01-12

## 2018-01-12 RX ADMIN — PROPOFOL 100 MCG/KG/MIN: 10 INJECTION, EMULSION INTRAVENOUS at 12:01

## 2018-01-12 RX ADMIN — BUPIVACAINE HYDROCHLORIDE 2 ML: 2.5 INJECTION, SOLUTION EPIDURAL; INFILTRATION; INTRACAUDAL; PERINEURAL at 12:01

## 2018-01-12 RX ADMIN — CEFAZOLIN SODIUM 2 G: 2 SOLUTION INTRAVENOUS at 12:01

## 2018-01-12 RX ADMIN — LIDOCAINE HYDROCHLORIDE AND EPINEPHRINE 10 ML: 20; 10 INJECTION, SOLUTION INFILTRATION; PERINEURAL at 01:01

## 2018-01-12 RX ADMIN — PROPOFOL 30 MG: 10 INJECTION, EMULSION INTRAVENOUS at 12:01

## 2018-01-12 RX ADMIN — LIDOCAINE HYDROCHLORIDE 50 MG: 20 INJECTION, SOLUTION INTRAVENOUS at 12:01

## 2018-01-12 RX ADMIN — SODIUM CHLORIDE, SODIUM LACTATE, POTASSIUM CHLORIDE, AND CALCIUM CHLORIDE: 600; 310; 30; 20 INJECTION, SOLUTION INTRAVENOUS at 10:01

## 2018-01-12 RX ADMIN — MIDAZOLAM HYDROCHLORIDE 2 MG: 1 INJECTION, SOLUTION INTRAMUSCULAR; INTRAVENOUS at 12:01

## 2018-01-12 RX ADMIN — PROPOFOL 50 MG: 10 INJECTION, EMULSION INTRAVENOUS at 12:01

## 2018-01-12 RX ADMIN — FENTANYL CITRATE 100 MCG: 50 INJECTION, SOLUTION INTRAMUSCULAR; INTRAVENOUS at 12:01

## 2018-01-12 NOTE — INTERVAL H&P NOTE
The patient has been examined and the H&P has been reviewed:    I concur with the findings and changes have been noted since the H&P was written: pt has elected to undergo a carpalt tunnel release on the right side only today.  consent addended.    Anesthesia/Surgery risks, benefits and alternative options discussed and understood by patient/family.          Active Hospital Problems    Diagnosis  POA    Bilateral carpal tunnel syndrome [G56.03]  Yes      Resolved Hospital Problems    Diagnosis Date Resolved POA   No resolved problems to display.

## 2018-01-12 NOTE — ANESTHESIA POSTPROCEDURE EVALUATION
"Anesthesia Post Evaluation    Patient: Mela Blanco    Procedure(s) Performed: Procedure(s) (LRB):  RELEASE-CARPAL TUNNEL (Right)    Final Anesthesia Type: general  Patient location during evaluation: [facility]  Patient participation: Yes- Able to Participate  Level of consciousness: awake and alert and oriented  Post-procedure vital signs: reviewed and stable  Pain management: adequate  Airway patency: patent  PONV status at discharge: No PONV  Anesthetic complications: no      Cardiovascular status: hemodynamically stable  Respiratory status: unassisted, spontaneous ventilation and room air  Hydration status: euvolemic  Follow-up not needed.        Visit Vitals  /71 (BP Location: Left arm, Patient Position: Sitting)   Pulse 82   Temp 37 °C (98.6 °F) (Oral)   Ht 5' 4" (1.626 m)   Wt 83.9 kg (185 lb)   LMP  (LMP Unknown)   SpO2 100%   Breastfeeding? No   BMI 31.75 kg/m²       Pain/Cecy Score: Pain Assessment Performed: Yes (1/12/2018  9:00 AM)  Presence of Pain: denies (1/12/2018  9:00 AM)      "

## 2018-01-12 NOTE — PLAN OF CARE
Mela Blanco has met all discharge criteria from Phase II. Vital Signs are stable, ambulating  without difficulty. Discharge instructions given, patient verbalized understanding. Discharged from facility via wheelchair in stable condition.

## 2018-01-12 NOTE — OR NURSING
Spoke with Aurelia in Dr Agrawal or room. Notified written consent for keith carpal tunnel repair. Stated only the right side. Also notified of sore to rt hand. Stated would notify Dr Agrawal.

## 2018-01-12 NOTE — TRANSFER OF CARE
"Anesthesia Transfer of Care Note    Patient: Mela Blanco    Procedure(s) Performed: Procedure(s) (LRB):  RELEASE-CARPAL TUNNEL (Right)    Patient location: [facility]    Anesthesia Type: general    Transport from OR: Transported from OR on room air with adequate spontaneous ventilation    Post pain: adequate analgesia    Post assessment: no apparent anesthetic complications and tolerated procedure well    Post vital signs: stable    Level of consciousness: awake, alert and oriented    Nausea/Vomiting: no nausea/vomiting    Complications: none    Transfer of care protocol was followed      Last vitals:   Visit Vitals  /71 (BP Location: Left arm, Patient Position: Sitting)   Pulse 82   Temp 37 °C (98.6 °F) (Oral)   Ht 5' 4" (1.626 m)   Wt 83.9 kg (185 lb)   LMP  (LMP Unknown)   SpO2 100%   Breastfeeding? No   BMI 31.75 kg/m²     "

## 2018-01-12 NOTE — BRIEF OP NOTE
[facility]  Brief Operative Note     SUMMARY     Surgery Date: 1/12/2018     Surgeon(s) and Role:     * Michael Agrawal Jr., MD - Primary    Assisting Surgeon: None    Pre-op Diagnosis:  Carpal tunnel syndrome on right [G56.01]    Post-op Diagnosis:  Post-Op Diagnosis Codes:     * Carpal tunnel syndrome on right [G56.01]    Procedure(s) (LRB):  RELEASE-CARPAL TUNNEL (Right)    Anesthesia: Local MAC    Description of the findings of the procedure: tight transverse retinacular ligament of right carpal tunnel    Findings/Key Components: same    Estimated Blood Loss: * No values recorded between 1/12/2018 12:31 PM and 1/12/2018 12:51 PM *         Specimens:   Specimen (12h ago through future)    None          Discharge Note    SUMMARY     Admit Date: 1/12/2018    Discharge Date and Time:  01/12/2018 12:52 PM    Hospital Course (synopsis of major diagnoses, care, treatment, and services provided during the course of the hospital stay): pt admitted for outpatient surgery     Final Diagnosis: Post-Op Diagnosis Codes:     * Carpal tunnel syndrome on right [G56.01]    Disposition: Home or Self Care    Follow Up/Patient Instructions: Follow up in hand clinic in 2 weeks    Medications:  Reconciled Home Medications:   Current Discharge Medication List      START taking these medications    Details   hydrocodone-acetaminophen 5-325mg (NORCO) 5-325 mg per tablet Take 1 tablet by mouth every 6 (six) hours as needed for Pain.  Qty: 30 tablet, Refills: 0         CONTINUE these medications which have NOT CHANGED    Details   caffeine (AWAKE) 200 mg Tab Take 200 mg by mouth every 4 (four) hours as needed.      cetirizine (ZYRTEC) 10 MG tablet Take 1 tablet (10 mg total) by mouth once daily. For allergies.  Qty: 30 tablet, Refills: 11    Associated Diagnoses: Perennial allergic rhinitis      ibuprofen (ADVIL,MOTRIN) 600 MG tablet Take 1 tablet (600 mg total) by mouth every 8 (eight) hours as needed for  Pain.  Qty: 40 tablet, Refills: 5    Associated Diagnoses: Bilateral foot pain; Chronic pain of right knee      phentermine 37.5 MG capsule Take 37.5 mg by mouth every morning.      ranitidine (ZANTAC) 150 MG capsule Take 1 capsule (150 mg total) by mouth 2 (two) times daily as needed for Heartburn.  Qty: 60 capsule, Refills: 11    Associated Diagnoses: Gastroesophageal reflux disease, esophagitis presence not specified      SIMETHICONE (PHAZYME ORAL) Take by mouth.      levonorgestrel (MIRENA) 20 mcg/24 hr (5 years) IUD 1 each by Intrauterine route once.      meloxicam (MOBIC) 15 MG tablet Take 1 tablet (15 mg total) by mouth once daily.  Qty: 30 tablet, Refills: 2    Associated Diagnoses: Chondromalacia of right knee             Discharge Procedure Orders  Diet general     Call MD for:  temperature >100.4     Call MD for:  persistent nausea and vomiting     Call MD for:  severe uncontrolled pain     Call MD for:  difficulty breathing, headache or visual disturbances     Call MD for:  redness, tenderness, or signs of infection (pain, swelling, redness, odor or green/yellow discharge around incision site)     Call MD for:  hives     Call MD for:  persistent dizziness or light-headedness     Call MD for:  extreme fatigue     Activity as tolerated     Keep surgical extremity elevated     Lifting restrictions     No driving, operating heavy equipment or signing legal documents while taking pain medication.     Change dressing (specify)   Order Comments: Remove splint and dressings in one week then may wash hand with soap and water       Follow-up Information     Michael Agrawal Jr, MD In 2 weeks.    Specialties:  Plastic Surgery, Hand Surgery  Why:  For suture removal  Contact information:  [facility] [id number] [phone]

## 2018-01-14 NOTE — OP NOTE
DATE OF PROCEDURE:  01/12/2018.    PREOPERATIVE DIAGNOSIS:  Right carpal tunnel syndrome.    POSTOPERATIVE DIAGNOSIS:  Right carpal tunnel syndrome.    PROCEDURE:  Right open carpal tunnel release.    SURGEON:  Michael Agrawal Jr., M.D.    ANESTHESIA:  Local with MAC.    FLUIDS:  300 mL crystalloid.    ESTIMATED BLOOD LOSS:  Minimal.    SPECIMENS:  None.    FINDINGS:  Tight transverse retinacular ligament of the right carpal tunnel.    IMPLANTS AND GRAFTS:  None.    COMPLICATIONS:  None.    DISPOSITION:  To PACU and then home.    INDICATIONS:  Ms. Blanco is a 32-year-old right-hand dominant female with an   EMG positive bilateral carpal tunnel syndrome.  The patient was initially   offered bilateral carpal tunnel releases.  However, on the morning of surgery,   the patient changed her mind and would like to undergo a right open carpal   tunnel release followed by a left carpal tunnel release at a later time.  The   risks, benefits and alternatives were discussed with the patient in detail and   informed consent was obtained and the patient was then scheduled for the   procedure.    PROCEDURE IN DETAIL:  After proper identification of Mrs. Blanco in the   preoperative area, the patient was wheeled to the Operating Room on hospital   stretcher.  Pressure points were padded and checked at this time.  A timeout was   called when surgeons, nurses, and Anesthesia agreed upon the patient and   procedure.  She received 2 g of Ancef for prophylactic antibiotics.  She was   then induced under MAC sedation.  After the patient was well sedated, 10 mL of   2% lidocaine with epinephrine was injected into the carpal tunnel and   subcutaneous tissue of the palm.  Next, the hand was then prepped and draped in   usual sterile fashion.  A padded 18-inch tourniquet was placed to her right   forearm.  Then, the hand was exsanguinated using Esmarch tourniquet and the   tourniquet was inflated to 250 mmHg.  A longitudinal incision  was created over   the carpal tunnel through the skin and dermis and subcutaneous tissue was then   divided under direct visualization to expose the palmaris fascia, which was once   again divided in a longitudinal fashion with a #15 blade scalpel.  I then   identified the transverse retinacular ligament.  This was then divided in a   distal to proximal direction with care not to injure the underlying structures   using a #15 blade scalpel.  Next, the antebrachial fasciotomy was created using   tenotomy scissors under direct visualization.  I then digitally palpated through   the carpal tunnel and was satisfied with the release proximally.  I then   inspected distally for any other areas of tightness within the fascia.  There   were none found.  I then inspected the median nerve, which was in continuity and   uninjured.  I also looked at the roof of the carpal tunnel for any masses,   dislocations or lesions.  There were none found.  I was then satisfied with this   release.  Hemostasis was obtained after the tourniquet was released.  The wound   was irrigated with copious amounts of normal saline and then closed using 4-0   nylon sutures in interrupted fashion.  0.25% Marcaine was injected around the   incision for postoperative anesthesia.  Next, the wound was then cleaned and   dried.  Xeroform was then applied followed by a dry sterile splint with the   wrist in neutral and the fingers free.  This concluded the procedure.  The   patient tolerated the procedure well without any complications.  At the end of   the case, needle and sponge counts were correct x2 and I was present and   scrubbed all aspects of procedure.  She was then taken to PACU for further   recovery.      DELANEY/SOLOMON  dd: 01/14/2018 07:18:21 (CST)  td: 01/14/2018 09:31:21 (CST)  Doc ID   #[id number]  Job ID #[id number]    CC:

## 2018-01-15 ENCOUNTER — TELEPHONE (OUTPATIENT)
Dept: ORTHOPEDICS | Facility: CLINIC | Age: 33
End: 2018-01-15

## 2018-01-15 NOTE — TELEPHONE ENCOUNTER
----- Message from Elizabeth Bravo sent at 1/15/2018 12:00 PM CST -----  _  1st Request  _  2nd Request  _  3rd Request        Who: patient     Why: Requesting a call back in regards to the status of her FMLA paper work, please call pt     What Number to Call Back:[phone]    When to Expect a call back: (Within 24 hours)    Please return the call at earliest convenience. Thanks!

## 2018-01-17 ENCOUNTER — TELEPHONE (OUTPATIENT)
Dept: ORTHOPEDICS | Facility: CLINIC | Age: 33
End: 2018-01-17

## 2018-01-17 NOTE — TELEPHONE ENCOUNTER
----- Message from Tina Mohr sent at 1/17/2018  1:26 PM CST -----  Contact: Self  X   1st Request  _  2nd Request  _  3rd Request        Who: ANGELLA VELAZCO [[id number]]    Why: Requesting a call back in regards to her [facility] paperwork. Pt is requesting a status update.    What Number to Call Back: [phone]    When to Expect a call back: (Within 24 hours)    Please return the call at earliest convenience. Thanks!

## 2018-01-17 NOTE — TELEPHONE ENCOUNTER
Phone call to patient. Wants to return to work on 1/22. Sutures will still be in hand. Wants to know how much weight she can carry, as she is a . I explained that I will speak to Dr. Agrawal tomorrow as he is in surgery this afternoon, but she will need to make sure that her hand is covered to protect sutures.

## 2018-01-19 ENCOUNTER — TELEPHONE (OUTPATIENT)
Dept: ORTHOPEDICS | Facility: CLINIC | Age: 33
End: 2018-01-19

## 2018-01-19 RX ORDER — HYDROCODONE BITARTRATE AND ACETAMINOPHEN 5; 325 MG/1; MG/1
1 TABLET ORAL EVERY 6 HOURS PRN
Qty: 20 TABLET | Refills: 0 | Status: SHIPPED | OUTPATIENT
Start: 2018-01-19 | End: 2018-02-08 | Stop reason: CLARIF

## 2018-01-19 NOTE — TELEPHONE ENCOUNTER
Phone call to patient explaining that weight limit for work is 8 pounds and must keep sutures covered and wear hard brace while at work. These restrictions are for 4 weeks. Also, Dr. Agrawal stated that he would write for Norco #20 only. Informed that she would have to come  Rx from our office. She also wanted a copy of FMLA form. Informed that this would also be at check in window with her Rx. Patient verbalized understanding of all of the above. [place] paper faxed to [phone].

## 2018-01-19 NOTE — PROGRESS NOTES
Pt complaining of pain and needs norco refill.  Will provide with refill for #20 after this the patient will be prescribed non-narcotics.

## 2018-01-19 NOTE — TELEPHONE ENCOUNTER
----- Message from Shiela Myers sent at 1/19/2018  3:08 PM CST -----  Contact: Patient herself  /  Ph# [phone]  X  1st Request  _  2nd Request  _  3rd Request    Who: Mela Blanco (mrn# [id number])    Why: Patient called requesting the status of her FMLA paperwork. Also patient's requesting on her pain medication NORCO.  Please give a call back at your earliest convenience.     THANKS!    What Number to Call Back:  [phone]    When to Expect a call back: (Before the end of the day)   -- if the call is after 12:00, the call back will be tomorrow.

## 2018-01-24 ENCOUNTER — TELEPHONE (OUTPATIENT)
Dept: OPTOMETRY | Facility: CLINIC | Age: 33
End: 2018-01-24

## 2018-01-24 NOTE — TELEPHONE ENCOUNTER
----- Message from Dave Madera sent at 1/24/2018  1:52 PM CST -----  Contact: leif Blanco would like a sooner appointment. She can be reached at [phone].

## 2018-01-26 ENCOUNTER — OFFICE VISIT (OUTPATIENT)
Dept: ORTHOPEDICS | Facility: CLINIC | Age: 33
End: 2018-01-26
Payer: COMMERCIAL

## 2018-01-26 VITALS
WEIGHT: 185 LBS | HEART RATE: 91 BPM | BODY MASS INDEX: 31.58 KG/M2 | HEIGHT: 64 IN | SYSTOLIC BLOOD PRESSURE: 141 MMHG | DIASTOLIC BLOOD PRESSURE: 97 MMHG

## 2018-01-26 DIAGNOSIS — Z98.890 S/P CARPAL TUNNEL RELEASE: ICD-10-CM

## 2018-01-26 DIAGNOSIS — Z98.890 POST-OPERATIVE STATE: ICD-10-CM

## 2018-01-26 DIAGNOSIS — G56.02 LEFT CARPAL TUNNEL SYNDROME: Primary | ICD-10-CM

## 2018-01-26 PROCEDURE — 99999 PR PBB SHADOW E&M-EST. PATIENT-LVL III: CPT | Mod: PBBFAC,,, | Performed by: PLASTIC SURGERY

## 2018-01-26 PROCEDURE — 99024 POSTOP FOLLOW-UP VISIT: CPT | Mod: S$GLB,,, | Performed by: PLASTIC SURGERY

## 2018-01-26 RX ORDER — SODIUM CHLORIDE 9 MG/ML
INJECTION, SOLUTION INTRAVENOUS CONTINUOUS
Status: CANCELLED | OUTPATIENT
Start: 2018-01-26

## 2018-01-26 NOTE — PROGRESS NOTES
"Ms. Blanco is here today for a post-operative visit.  She is 14 days status post right carpal tunnel release by Dr. Agrawal on 1/12/18. She reports that she is doing well. The two most distal sutures fell out on their own. Pain is 2/10.  She is taking pain medication, taking Nocro bid.  She denies fever, chills, and sweats since the time of the surgery.     Physical exam:    Vitals:    01/26/18 1139   BP: (!) 141/97   Pulse: 91   Weight: 83.9 kg (185 lb)   Height: 5' 4" (1.626 m)   PainSc:   2   PainLoc: Wrist     Vital signs are stable, patient is afebrile.  Patient is well dressed and well groomed, no acute distress.  Alert and oriented to person, place, and time.  Post op dressing taken down.  Incision is clean, dry and intact.  There is no erythema or exudate.  There is no sign of any infection. She is NVI. Sutures removed without difficulty.  She has good ROM of the wrist and fingers.     Assessment: 14 days status post right carpal tunnel release    Plan:  Mela was seen today for post-op evaluation.    Diagnoses and all orders for this visit:    S/P carpal tunnel release    Post-operative state    Left carpal tunnel syndrome      - PO instruction reviewed and provided to patient  -consents reviewed and signed for left carpal tunnel release  -RTC post op L CTR    Lorelei Cage PA-C  [facility]   [facility] [place], [place]      "

## 2018-01-30 ENCOUNTER — TELEPHONE (OUTPATIENT)
Dept: SPORTS MEDICINE | Facility: CLINIC | Age: 33
End: 2018-01-30

## 2018-01-30 ENCOUNTER — OFFICE VISIT (OUTPATIENT)
Dept: OPTOMETRY | Facility: CLINIC | Age: 33
End: 2018-01-30
Payer: COMMERCIAL

## 2018-01-30 DIAGNOSIS — H40.023 OPEN ANGLE WITH BORDERLINE FINDINGS, HIGH RISK, BILATERAL: Primary | ICD-10-CM

## 2018-01-30 DIAGNOSIS — Z46.0 FITTING AND ADJUSTMENT OF SPECTACLES AND CONTACT LENSES: Primary | ICD-10-CM

## 2018-01-30 DIAGNOSIS — H52.203 MYOPIA WITH ASTIGMATISM, BILATERAL: ICD-10-CM

## 2018-01-30 DIAGNOSIS — H52.13 MYOPIA WITH ASTIGMATISM, BILATERAL: ICD-10-CM

## 2018-01-30 PROCEDURE — 92015 DETERMINE REFRACTIVE STATE: CPT | Mod: S$GLB,,, | Performed by: OPTOMETRIST

## 2018-01-30 PROCEDURE — 92014 COMPRE OPH EXAM EST PT 1/>: CPT | Mod: S$GLB,,, | Performed by: OPTOMETRIST

## 2018-01-30 PROCEDURE — 92310 CONTACT LENS FITTING OU: CPT | Mod: ,,, | Performed by: OPTOMETRIST

## 2018-01-30 PROCEDURE — 99999 PR PBB SHADOW E&M-EST. PATIENT-LVL II: CPT | Mod: PBBFAC,,, | Performed by: OPTOMETRIST

## 2018-01-30 RX ORDER — PHENTERMINE HYDROCHLORIDE 37.5 MG/1
TABLET ORAL
COMMUNITY
Start: 2017-12-13 | End: 2019-11-27

## 2018-01-30 NOTE — PROGRESS NOTES
ARABELLA GALVEZ 10/2016 Was given trial contacts last exam.  Never came back to check   fit.  Patient is interested in trying contacts again today.  Glasses are   from last visit, still seem fine. Didn't bring glasses today.  Would like   RX for contacts and glasses today.    Last edited by Parul Portillo on 1/30/2018  8:21 AM. (History)            Assessment /Plan     For exam results, see Encounter Report.    Open angle with borderline findings, high risk, bilateral    Myopia with astigmatism, bilateral      1. High risk based on fam history, race and cup to disk ratio. IOP fine. RTC for dfe.  2. Spec Rx given and  Contact lens trials dispensed to pt. Daily wear only advised, with education to risks of extended wear.  Discussed lens care, compliance and solutions.  RTC 2 weeks contact lens follow up. . Different lens options discussed with patient. RTC for DFe also, pt chose to defer.

## 2018-02-05 ENCOUNTER — TELEPHONE (OUTPATIENT)
Dept: ORTHOPEDICS | Facility: CLINIC | Age: 33
End: 2018-02-05

## 2018-02-05 NOTE — TELEPHONE ENCOUNTER
----- Message from Chana Arellano sent at 2/5/2018  4:19 PM CST -----  Contact: pt   x 1st Request  _ 2nd Request  _ 3rd Request    Who:pt     Why:pt is calling in regards to paperwork for her hand surgery that needs to be completed.     What Number to Call Back:[phone]     When to Expect a call back: (Before the end of the day)  -- if call after 3:00 call back will be tomorrow.

## 2018-02-08 ENCOUNTER — TELEPHONE (OUTPATIENT)
Dept: ORTHOPEDICS | Facility: CLINIC | Age: 33
End: 2018-02-08

## 2018-02-08 ENCOUNTER — ANESTHESIA EVENT (OUTPATIENT)
Dept: SURGERY | Facility: OTHER | Age: 33
End: 2018-02-08
Payer: COMMERCIAL

## 2018-02-08 NOTE — TELEPHONE ENCOUNTER
----- Message from Minh Gutierrez sent at 2/8/2018  3:59 PM CST -----  Contact: Patient  x_ 1st Request   _ 2nd Request   _ 3rd Request     Who: ANGELLA VELAZCO [[id number]]    Why: patient called stated she will not be coming tomorrow for her procedure.  She could not get paperwork complete to see if her leave and pay would be approved.  Patient will call back when she is ready to reschedule.    What Number to Call Back: [phone]    When to Expect a call back: (Before the end of the day)   -- if call after 3:00 call back will be tomorrow.

## 2018-02-09 ENCOUNTER — ANESTHESIA (OUTPATIENT)
Dept: SURGERY | Facility: OTHER | Age: 33
End: 2018-02-09
Payer: COMMERCIAL

## 2018-02-21 DIAGNOSIS — J30.89 PERENNIAL ALLERGIC RHINITIS: ICD-10-CM

## 2018-02-21 RX ORDER — CETIRIZINE HYDROCHLORIDE 10 MG/1
TABLET ORAL
Qty: 90 TABLET | Refills: 1 | Status: SHIPPED | OUTPATIENT
Start: 2018-02-21 | End: 2019-11-27 | Stop reason: SDUPTHER

## 2018-02-22 ENCOUNTER — TELEPHONE (OUTPATIENT)
Dept: ORTHOPEDICS | Facility: CLINIC | Age: 33
End: 2018-02-22

## 2018-02-26 ENCOUNTER — PATIENT MESSAGE (OUTPATIENT)
Dept: OPTOMETRY | Facility: CLINIC | Age: 33
End: 2018-02-26

## 2018-03-07 ENCOUNTER — OFFICE VISIT (OUTPATIENT)
Dept: INTERNAL MEDICINE | Facility: CLINIC | Age: 33
End: 2018-03-07
Payer: COMMERCIAL

## 2018-03-07 VITALS
OXYGEN SATURATION: 98 % | BODY MASS INDEX: 31.84 KG/M2 | DIASTOLIC BLOOD PRESSURE: 64 MMHG | HEIGHT: 64 IN | TEMPERATURE: 99 F | SYSTOLIC BLOOD PRESSURE: 109 MMHG | WEIGHT: 186.5 LBS | HEART RATE: 99 BPM

## 2018-03-07 DIAGNOSIS — K29.60 NSAID INDUCED GASTRITIS: Primary | ICD-10-CM

## 2018-03-07 DIAGNOSIS — T39.395A NSAID INDUCED GASTRITIS: Primary | ICD-10-CM

## 2018-03-07 DIAGNOSIS — R11.0 NAUSEA: ICD-10-CM

## 2018-03-07 PROCEDURE — 99214 OFFICE O/P EST MOD 30 MIN: CPT | Mod: S$GLB,,, | Performed by: INTERNAL MEDICINE

## 2018-03-07 PROCEDURE — 99999 PR PBB SHADOW E&M-EST. PATIENT-LVL IV: CPT | Mod: PBBFAC,,, | Performed by: INTERNAL MEDICINE

## 2018-03-07 RX ORDER — OMEPRAZOLE 40 MG/1
40 CAPSULE, DELAYED RELEASE ORAL DAILY
Qty: 90 CAPSULE | Refills: 4 | Status: SHIPPED | OUTPATIENT
Start: 2018-03-07 | End: 2019-11-27 | Stop reason: SDUPTHER

## 2018-03-07 NOTE — PROGRESS NOTES
INTERNAL MEDICINE CLINIC - SAME DAY APPOINTMENT  Progress Note    PRESENTING HISTORY     PCP: Jessy Solorzano MD  Chief Complaint/Reason for Visit:     Chief Complaint   Patient presents with    Nausea     History of Present Illness & ROS : Ms. Mela Blanco is a 32 y.o. female.      Started Monday morning with severe nausea.  She had vomiting and diarrhea (X1 in the morning, X4 later on).  No diarrhea yesterday. Positive nausea.  No fever.    Sunday she had steak and potato dinner.  Lobato and grits for brunch.    This is the third time.    She had a lap band 2012.    She took Gas-X today. No nausea currently.  She does have GERD.    She is taking Ibuprofen for carpal tunnel.    PAST HISTORY:     Past Medical History:   Diagnosis Date    Bariatric surgery status 5/25/2015    Bilateral carpal tunnel syndrome 1/12/2018    Miscarriage        Past Surgical History:   Procedure Laterality Date    breast reduction      CARPAL TUNNEL RELEASE Right 01/12/2018    hematoma surgery      LAPAROSCOPIC GASTRIC BANDING         Family History   Problem Relation Age of Onset    Diabetes Mother     Hypertension Mother     Hyperlipidemia Mother     Glaucoma Mother     Arthritis Father     Hypertension Father     Diabetes Maternal Grandmother     Amblyopia Neg Hx     Blindness Neg Hx     Cancer Neg Hx     Cataracts Neg Hx     Macular degeneration Neg Hx     Retinal detachment Neg Hx     Strabismus Neg Hx     Stroke Neg Hx     Thyroid disease Neg Hx        Social History     Social History    Marital status: Single     Spouse name: N/A    Number of children: 1    Years of education: N/A     Social History Main Topics    Smoking status: Never Smoker    Smokeless tobacco: Never Used    Alcohol use Yes      Comment: occasional.    Drug use: No    Sexual activity: Not Asked     Other Topics Concern    None     Social History Narrative    None       MEDICATIONS & ALLERGIES:     Current  Outpatient Prescriptions on File Prior to Visit   Medication Sig Dispense Refill    caffeine (AWAKE) 200 mg Tab Take 200 mg by mouth every 4 (four) hours as needed.      cetirizine (ZYRTEC) 10 MG tablet TAKE 1 TABLET(10 MG) BY MOUTH EVERY DAY FOR ALLERGIES 90 tablet 1    ibuprofen (ADVIL,MOTRIN) 600 MG tablet Take 1 tablet (600 mg total) by mouth every 8 (eight) hours as needed for Pain. 40 tablet 5    levonorgestrel (MIRENA) 20 mcg/24 hr (5 years) IUD 1 each by Intrauterine route once.      phentermine (ADIPEX-P) 37.5 mg tablet       phentermine 37.5 MG capsule Take 37.5 mg by mouth every morning.      SIMETHICONE (PHAZYME ORAL) Take by mouth.       ranitidine (ZANTAC) 150 MG capsule Take 1 capsule (150 mg total) by mouth 2 (two) times daily as needed for Heartburn. 60 capsule 11     No current facility-administered medications on file prior to visit.         Review of patient's allergies indicates:  No Known Allergies    Medications Reconciliation:   I have reconciled the patient's home medications with the patient/family. I have updated all changes.  Refer to After-Visit Medication List.    OBJECTIVE:     Vital Signs:  Vitals:    03/07/18 1413   BP: 109/64   Pulse: 99   Temp: 98.9 °F (37.2 °C)     Wt Readings from Last 1 Encounters:   03/07/18 1413 84.6 kg (186 lb 8.2 oz)     Body mass index is 32.01 kg/m².     Physical Exam:  General: Well developed, well nourished. No distress.  HEENT: Head is normocephalic, atraumatic   Eyes: Clear conjunctiva.  Neck: Supple, symmetrical neck; trachea midline.  Lungs: Clear to auscultation bilaterally and normal respiratory effort.  Cardiovascular: Heart with regular rate and rhythm.    Extremities: No LE edema.   Abdomen: Abdomen is soft, non-tender non-distended with normal bowel sounds.  Psychiatric: Normal affect. Alert.    Laboratory  Lab Results   Component Value Date    WBC 3.98 10/06/2017    HGB 13.7 10/06/2017    HCT 42.9 10/06/2017     10/06/2017     CHOL 147 10/06/2017    TRIG 56 05/25/2015    HDL 66 05/25/2015    ALT 17 10/06/2017    AST 16 10/06/2017     10/06/2017    K 4.5 10/06/2017     10/06/2017    CREATININE 0.9 10/06/2017    BUN 22 (H) 10/06/2017    CO2 26 10/06/2017    TSH 0.672 05/25/2015    HGBA1C 5.0 10/06/2017       ASSESSMENT & PLAN:     Nausea  NSAID induced gastritis  - She needs the NSAID until left hand carpal tunnel can be released this year.    Will Rx PPI for gastric protectin (history lap band).    Rx:  -     omeprazole (PRILOSEC) 40 MG capsule; Take 1 capsule (40 mg total) by mouth once daily.  Dispense: 90 capsule; Refill: 4    Scheduled Follow-up :  Future Appointments  Date Time Provider Department Center   3/9/2018 2:15 PM Mary Valladares PA-C [facility]       After Visit Medication List :     Medication List          Accurate as of 3/7/18  2:26 PM. If you have any questions, ask your nurse or doctor.               START taking these medications    omeprazole 40 MG capsule  Commonly known as:  PRILOSEC  Take 1 capsule (40 mg total) by mouth once daily.  Started by:  Agus Florez MD        CONTINUE taking these medications    AWAKE 200 mg Tab  Generic drug:  caffeine     cetirizine 10 MG tablet  Commonly known as:  ZYRTEC  TAKE 1 TABLET(10 MG) BY MOUTH EVERY DAY FOR ALLERGIES     ibuprofen 600 MG tablet  Commonly known as:  ADVIL,MOTRIN  Take 1 tablet (600 mg total) by mouth every 8 (eight) hours as needed for Pain.     levonorgestrel 20 mcg/24 hr (5 years) IUD  Commonly known as:  MIRENA     PHAZYME ORAL     * phentermine 37.5 MG capsule     * phentermine 37.5 mg tablet  Commonly known as:  ADIPEX-P        * This list has 2 medication(s) that are the same as other medications prescribed for you. Read the directions carefully, and ask your doctor or other care provider to review them with you.            STOP taking these medications    ranitidine 150 MG capsule  Commonly known as:  ZANTAC  Stopped by:  Agus ANDREA  MD Gautam           Where to Get Your Medications      These medications were sent to [id number] - [place] - [address]  AT [facility] [address]  [address] [place] [address]    Hours:  24-hours Phone:  [phone]   · omeprazole 40 MG capsule         Signing Physician:  Agus Florez MD

## 2018-03-09 ENCOUNTER — HOSPITAL ENCOUNTER (OUTPATIENT)
Dept: RADIOLOGY | Facility: HOSPITAL | Age: 33
Discharge: HOME | End: 2018-03-09
Attending: PHYSICIAN ASSISTANT
Payer: COMMERCIAL

## 2018-03-09 ENCOUNTER — OFFICE VISIT (OUTPATIENT)
Dept: ORTHOPEDICS | Facility: CLINIC | Age: 33
End: 2018-03-09
Payer: COMMERCIAL

## 2018-03-09 VITALS — HEIGHT: 64 IN | WEIGHT: 185.63 LBS | BODY MASS INDEX: 31.69 KG/M2

## 2018-03-09 DIAGNOSIS — M79.671 PAIN IN BOTH FEET: ICD-10-CM

## 2018-03-09 DIAGNOSIS — M76.822 POSTERIOR TIBIAL TENDINITIS OF LEFT LOWER EXTREMITY: Primary | ICD-10-CM

## 2018-03-09 DIAGNOSIS — M25.572 LEFT ANKLE PAIN, UNSPECIFIED CHRONICITY: ICD-10-CM

## 2018-03-09 DIAGNOSIS — M79.672 PAIN IN BOTH FEET: ICD-10-CM

## 2018-03-09 PROCEDURE — 73630 X-RAY EXAM OF FOOT: CPT | Mod: 26,50,, | Performed by: RADIOLOGY

## 2018-03-09 PROCEDURE — 73610 X-RAY EXAM OF ANKLE: CPT | Mod: 26,LT,, | Performed by: RADIOLOGY

## 2018-03-09 PROCEDURE — 99213 OFFICE O/P EST LOW 20 MIN: CPT | Mod: S$GLB,,, | Performed by: PHYSICIAN ASSISTANT

## 2018-03-09 PROCEDURE — 99999 PR PBB SHADOW E&M-EST. PATIENT-LVL III: CPT | Mod: PBBFAC,,, | Performed by: PHYSICIAN ASSISTANT

## 2018-03-09 PROCEDURE — 73630 X-RAY EXAM OF FOOT: CPT | Mod: 50,TC

## 2018-03-09 PROCEDURE — 73610 X-RAY EXAM OF ANKLE: CPT | Mod: TC,LT

## 2018-03-09 NOTE — LETTER
March 9, 2018      Jessy Solorzano MD  [address]  [facility] [address]           [facility]  [address]  [facility] [address]  Phone: [phone]          Patient: Mela Blanco   MR Number: [id number]   YOB: 1985   Date of Visit: 3/9/2018       Dear Dr. Jessy Solorzano:    Thank you for referring Mela Blanco to me for evaluation. Attached you will find relevant portions of my assessment and plan of care.    If you have questions, please do not hesitate to call me. I look forward to following Mela Blanco along with you.    Sincerely,    Mary Valladares PA-C    Enclosure  CC:  No Recipients    If you would like to receive this communication electronically, please contact [email or web address] or [phone] to request more information on [employer] Link access.    For providers and/or their staff who would like to refer a patient to [facility], please contact us through our one-stop-shop provider referral line, Katia Lopez, at [phone].    If you feel you have received this communication in error or would no longer like to receive these types of communications, please e-mail [email or web address]

## 2018-03-09 NOTE — PROGRESS NOTES
Subjective:      Patient ID: Mela Blanco is a 32 y.o. female.    Chief Complaint: Pain of the Right Foot; Pain of the Left Foot; and Pain of the Left Ankle    HPI  32 year old female presents with chief complaint of left ankle pain x 2-3 months and bilateral foot pain x 6-7 months. No trauma. Pain is at the arches. Left ankle pain is worse and it is medial. Pain is worse with walking and steps. She tried inserts but it did not help. She was taking ibuprofen which helped but it started causing upset stomach. She denies swelling. She reports clicking and giving way on the left.   Review of Systems   Constitution: Negative for chills, fever and night sweats.   Cardiovascular: Negative for chest pain.   Respiratory: Negative for cough and shortness of breath.    Hematologic/Lymphatic: Does not bruise/bleed easily.   Skin: Negative for color change.   Gastrointestinal: Negative for heartburn.   Genitourinary: Negative for dysuria.   Neurological: Negative for numbness and paresthesias.   Psychiatric/Behavioral: Negative for altered mental status.   Allergic/Immunologic: Negative for persistent infections.         Objective:            General    Vitals reviewed.  Constitutional: She is oriented to person, place, and time. She appears well-developed and well-nourished.   Cardiovascular: Normal rate.    Neurological: She is alert and oriented to person, place, and time.         Right Ankle/Foot Exam     Inspection   Erythema: absent    Range of Motion   The patient has normal right ankle ROM.    Muscle Strength   The patient has normal right ankle strength.    Tests   Single Heel Rise: able to perform    Other   Sensation: normal    Comments:  No TTP.     Left Ankle/Foot Exam     Inspection  Erythema: absent    Tenderness   The patient is tender to palpation of the posterior tibial tendon.    Range of Motion   The patient has normal left ankle ROM.     Muscle Strength   The patient has normal left ankle  strength.    Tests   Single Heel Rise: able to perform    Other   Sensation: normal    Comments:  Pain with single heel rise.       Vascular Exam     Right Pulses  Dorsalis Pedis:      2+          Left Pulses  Dorsalis Pedis:      2+              X-rays: ordered and reviewed by myself. No fx or dislocation.         Assessment:       Encounter Diagnoses   Name Primary?    Posterior tibial tendinitis of left lower extremity Yes    Pain in both feet           Plan:       Patient was placed in a CAM boot on the left. She is WBAT in the boot for 4 weeks. Recommend Spenco orthotics. She will take tylenol as needed. RTC in 4 weeks for re-evaluation.

## 2018-03-09 NOTE — LETTER
March 9, 2018    Mela Blanco  [address] [facility] [address]             [facility]  [address]  [facility] [address]  Phone: [phone] To whom it may concern:    Please allow Ms. Blanco to wear the boot while working until her follow up appointment on 4/6/18.     If you have any questions or concerns, please don't hesitate to call.    Sincerely,        Mary Valladares PA-C

## 2018-03-22 ENCOUNTER — TELEPHONE (OUTPATIENT)
Dept: ORTHOPEDICS | Facility: CLINIC | Age: 33
End: 2018-03-22

## 2018-03-23 ENCOUNTER — OFFICE VISIT (OUTPATIENT)
Dept: ORTHOPEDICS | Facility: CLINIC | Age: 33
End: 2018-03-23
Payer: COMMERCIAL

## 2018-03-23 VITALS
SYSTOLIC BLOOD PRESSURE: 124 MMHG | WEIGHT: 185 LBS | DIASTOLIC BLOOD PRESSURE: 82 MMHG | HEART RATE: 89 BPM | HEIGHT: 64 IN | BODY MASS INDEX: 31.58 KG/M2

## 2018-03-23 DIAGNOSIS — G56.02 LEFT CARPAL TUNNEL SYNDROME: Primary | ICD-10-CM

## 2018-03-23 PROCEDURE — 20526 THER INJECTION CARP TUNNEL: CPT | Mod: 79,LT,S$GLB, | Performed by: PLASTIC SURGERY

## 2018-03-23 PROCEDURE — 99213 OFFICE O/P EST LOW 20 MIN: CPT | Mod: 24,25,S$GLB, | Performed by: PLASTIC SURGERY

## 2018-03-23 PROCEDURE — 99999 PR PBB SHADOW E&M-EST. PATIENT-LVL III: CPT | Mod: PBBFAC,,, | Performed by: PLASTIC SURGERY

## 2018-03-23 RX ORDER — DEXAMETHASONE SODIUM PHOSPHATE 4 MG/ML
4 INJECTION, SOLUTION INTRA-ARTICULAR; INTRALESIONAL; INTRAMUSCULAR; INTRAVENOUS; SOFT TISSUE
Status: COMPLETED | OUTPATIENT
Start: 2018-03-23 | End: 2018-03-23

## 2018-03-23 RX ORDER — LIDOCAINE HYDROCHLORIDE 10 MG/ML
1 INJECTION INFILTRATION; PERINEURAL
Status: COMPLETED | OUTPATIENT
Start: 2018-03-23 | End: 2018-03-23

## 2018-03-23 RX ADMIN — DEXAMETHASONE SODIUM PHOSPHATE 4 MG: 4 INJECTION, SOLUTION INTRA-ARTICULAR; INTRALESIONAL; INTRAMUSCULAR; INTRAVENOUS; SOFT TISSUE at 04:03

## 2018-03-23 RX ADMIN — LIDOCAINE HYDROCHLORIDE 1 ML: 10 INJECTION INFILTRATION; PERINEURAL at 04:03

## 2018-03-23 NOTE — PROGRESS NOTES
"HISTORY OF PRESENT ILLNESS:  Ms. Blanco returns today complaining of numbness   and tingling within the left hand.  The patient states that she is unable to   undergo a surgical release of the left carpal tunnel due to the fact that she   does not have time off at work as of now she would like to discuss possible   repeat injection.  She also complains of occasional tenderness at the base of   the thumb on the palmar aspect of her right hand, otherwise her symptoms of   numbness and tingling on the right hand has improved completely.  She has no   other complaints.    PHYSICAL EXAMINATION:  VITAL SIGNS: /82 (BP Location: Right arm, Patient Position: Sitting, BP Method: Medium (Automatic))   Pulse 89   Ht 5' 4" (1.626 m)   Wt 83.9 kg (185 lb)   BMI 31.76 kg/m²     GENERAL:  In no acute distress, alert and oriented x3, cooperative, well   nourished.  EXTREMITIES:  Right upper extremity, well-healed carpal tunnel incision.  There   is tenderness located at the insertion of the thenar muscles along the flexor   retinacular ligament.  She has full active range of motion of all digits at all   joints and she is neurovascularly intact in the median, radial, and ulnar   deviations.  Left upper extremity, positive Tinel's and Durkan's sign of carpal   tunnel.  Full active range of motion of all digits and all joints.  She is   neurovascularly intact in the median, radial, and ulnar distributions.    PROCEDURE:  I have explained the risks, benefits, and alternatives of the procedure in detail.  The patient voices understanding and all questions have been answered.  The patient agrees to proceed as planned. After a sterile prep of the skin the left carpal tunnel is injected from the volar approach using a 25 gauge needle with a combination of 1cc 1% plain xylocaine and 4 mg of dexamethasone.  The patient is cautioned and immediate relief of pain is secondary to the local anesthetic and will be temporary.  After the " anesthetic wears off there may be a increase in pain that may last for a few hours or a few days and they should use ice to help alleviate this flair up of pain.       ASSESSMENT:  Left carpal tunnel syndrome.    PLAN:  Once again discussed treatment with a corticosteroid injection with   dexamethasone today.  I encouraged the patient to seek surgery in the near   future due to the fact that her injections are not lasting.  In addition, I   discussed that she has pillar pain on the right and that she should massage this   area several times a day and this should improve with time.  The patient agreed   with this above assessment and plan.  She will follow up with me as needed.    All questions and concerns were addressed prior to discharge.      DELANEY/SOLOMON  dd: 03/23/2018 16:19:08 (CDT)  td: 03/24/2018 09:56:22 (CDT)  Doc ID   #[id number]  Job ID #[id number]    CC:       Dictation Confirmation Code: [id number]  Michael Agrawal Jr. MD  03/23/2018  4:15 PM

## 2018-04-03 ENCOUNTER — TELEPHONE (OUTPATIENT)
Dept: ORTHOPEDICS | Facility: CLINIC | Age: 33
End: 2018-04-03

## 2018-04-03 NOTE — TELEPHONE ENCOUNTER
----- Message from Jeana Mehdi sent at 4/3/2018 10:50 AM CDT -----  Contact: ANGELLA VELAZCO [[id number]]        Name of Who is Calling:  ANGELLA VELAZCO [[id number]]      What is the request in detail: Patient called she stated that the injection is not working she is still in a lot of pain. Please call her to advise.       Can the clinic reply by [facility]: Yes       What Number to Call Back if not in [facility]: [phone]

## 2018-04-04 ENCOUNTER — TELEPHONE (OUTPATIENT)
Dept: ORTHOPEDICS | Facility: CLINIC | Age: 33
End: 2018-04-04

## 2018-04-04 NOTE — TELEPHONE ENCOUNTER
----- Message from Elena Mohan sent at 4/4/2018 11:35 AM CDT -----  Contact: pt            Name of Who is Calling: pt      What is the request in detail: following up on phone call from yesterday. Wouldn't say what it was in regards to. Call pt      Can the clinic reply by [facility]: no      What Number to Call Back if not in [facility]: [phone]

## 2018-04-06 ENCOUNTER — OFFICE VISIT (OUTPATIENT)
Dept: ORTHOPEDICS | Facility: CLINIC | Age: 33
End: 2018-04-06
Payer: COMMERCIAL

## 2018-04-06 VITALS — BODY MASS INDEX: 32.51 KG/M2 | WEIGHT: 189.38 LBS

## 2018-04-06 DIAGNOSIS — M76.822 POSTERIOR TIBIAL TENDINITIS OF LEFT LOWER EXTREMITY: Primary | ICD-10-CM

## 2018-04-06 PROCEDURE — 99213 OFFICE O/P EST LOW 20 MIN: CPT | Mod: S$GLB,,, | Performed by: PHYSICIAN ASSISTANT

## 2018-04-06 PROCEDURE — 99999 PR PBB SHADOW E&M-EST. PATIENT-LVL II: CPT | Mod: PBBFAC,,, | Performed by: PHYSICIAN ASSISTANT

## 2018-04-06 NOTE — PROGRESS NOTES
Subjective:      Patient ID: Mela Blanco is a 32 y.o. female.    Chief Complaint: No chief complaint on file.    HPI  Patient returns for f/u for her left ankle pain. She was given a boot at her last visit 4 weeks ago but she wasn't able to wear it much. They did not allow her to wear it at work. She wore it some at home. She says it did help when she would wear it. She still has pain at the medial aspect. It is worse with increased walking. She wears an insert which helps. She took ibuprofen and tylenol with no relief.   Review of Systems   Constitution: Negative for chills, fever and night sweats.   Cardiovascular: Negative for chest pain.   Respiratory: Negative for cough and shortness of breath.    Hematologic/Lymphatic: Does not bruise/bleed easily.   Skin: Negative for color change.   Gastrointestinal: Negative for heartburn.   Genitourinary: Negative for dysuria.   Neurological: Negative for numbness and paresthesias.   Psychiatric/Behavioral: Negative for altered mental status.   Allergic/Immunologic: Negative for persistent infections.         Objective:            General    Vitals reviewed.  Constitutional: She is oriented to person, place, and time. She appears well-developed and well-nourished.   Cardiovascular: Normal rate.    Neurological: She is alert and oriented to person, place, and time.         Left Ankle/Foot Exam     Inspection  Erythema: absent    Tenderness   The patient is tender to palpation of the posterior tibial tendon.    Range of Motion   The patient has normal left ankle ROM.     Muscle Strength   The patient has normal left ankle strength.    Tests   Single Heel Rise: able to perform    Other   Sensation: normal      Vascular Exam       Left Pulses  Dorsalis Pedis:      2+                      Assessment:       Encounter Diagnosis   Name Primary?    Posterior tibial tendinitis of left lower extremity Yes          Plan:       Patient is trying to avoid surgery at this  time. She would like to try an ankle brace. She will continue the insert. She will take mobic x 2 weeks then prn. RTC in 4 weeks for re-evaluation.

## 2018-04-11 ENCOUNTER — OFFICE VISIT (OUTPATIENT)
Dept: ORTHOPEDICS | Facility: CLINIC | Age: 33
End: 2018-04-11
Payer: COMMERCIAL

## 2018-04-11 VITALS
SYSTOLIC BLOOD PRESSURE: 125 MMHG | WEIGHT: 189 LBS | RESPIRATION RATE: 18 BRPM | DIASTOLIC BLOOD PRESSURE: 90 MMHG | HEIGHT: 64 IN | HEART RATE: 89 BPM | BODY MASS INDEX: 32.27 KG/M2

## 2018-04-11 DIAGNOSIS — G56.02 LEFT CARPAL TUNNEL SYNDROME: Primary | ICD-10-CM

## 2018-04-11 PROCEDURE — 99213 OFFICE O/P EST LOW 20 MIN: CPT | Mod: 24,25,S$GLB, | Performed by: PLASTIC SURGERY

## 2018-04-11 PROCEDURE — 20526 THER INJECTION CARP TUNNEL: CPT | Mod: 79,LT,S$GLB, | Performed by: PLASTIC SURGERY

## 2018-04-11 PROCEDURE — 99999 PR PBB SHADOW E&M-EST. PATIENT-LVL III: CPT | Mod: PBBFAC,,, | Performed by: PLASTIC SURGERY

## 2018-04-11 RX ORDER — TRIAMCINOLONE ACETONIDE 40 MG/ML
40 INJECTION, SUSPENSION INTRA-ARTICULAR; INTRAMUSCULAR
Status: COMPLETED | OUTPATIENT
Start: 2018-04-11 | End: 2018-04-11

## 2018-04-11 RX ORDER — MELOXICAM 15 MG/1
15 TABLET ORAL DAILY
COMMUNITY
End: 2018-10-16 | Stop reason: CLARIF

## 2018-04-11 RX ADMIN — TRIAMCINOLONE ACETONIDE 40 MG: 40 INJECTION, SUSPENSION INTRA-ARTICULAR; INTRAMUSCULAR at 11:04

## 2018-04-11 NOTE — PROGRESS NOTES
"HISTORY OF PRESENT ILLNESS:  Ms. Blanco returns today complaining of numbness   and tingling and pain within the left hand.  She states that her previous   injection of dexamethasone within the carpal tunnel did nothing to alleviate her   symptoms.  She would like to discuss a repeat injection using Kenalog as this   has worked effectively in the past.  She states that she is looking to have   surgery in early July if possible and she has no other complaints.    PHYSICAL EXAMINATION:  VITAL SIGNS:  BP (!) 125/90 (BP Location: Left arm, Patient Position: Sitting, BP Method: Large (Automatic))   Pulse 89   Resp 18   Ht 5' 4" (1.626 m)   Wt 85.7 kg (189 lb)   BMI 32.44 kg/m²     GENERAL:  No acute distress, alert and oriented x3, cooperative, well nourished.  LUNGS:  Nonlabored on room air.  CARDIOVASCULAR:  Distal pulses intact.  Good capillary refill.  No clubbing,   cyanosis or edema.  LEFT UPPER EXTREMITY:  Positive Tinel's and Durkan's sign over the carpal   tunnel.  Full active range of motion of all digits at all joints.  She is   neurovascularly intact.    PROCEDURE:  I have explained the risks, benefits, and alternatives of the procedure in detail.  The patient voices understanding and all questions have been answered.  The patient agrees to proceed as planned. After a sterile prep of the skin the left carpal tunnel is injected from the volar approach using a 25 gauge needle with a combination of 1cc 1% plain xylocaine and 40 mg of Kenalog.  The patient is cautioned and immediate relief of pain is secondary to the local anesthetic and will be temporary.  After the anesthetic wears off there may be a increase in pain that may last for a few hours or a few days and they should use ice to help alleviate this flair up of pain.       ASSESSMENT:  Left carpal tunnel syndrome.    PLAN:  I discussed performing a repeat injection into the carpal tunnel today   using Kenalog.  I discussed the possibility of " complications due to the fact   that the patient has had multiple injections into the carpal tunnel.  These   complications include possible tendon rupture.  After discussing this with the   patient, she understood this and wished to proceed with the injection despite   the risk.  Therefore, she was given the injection of Kenalog.  She was   instructed to follow up with me as needed if her symptoms return or worsen.  All   questions and concerns were addressed prior to discharge.      DELANEY/SOLOMON  dd: 04/11/2018 10:50:29 (CDT)  td: 04/12/2018 07:33:56 (CDT)  Doc ID   #[id number]  Job ID #[id number]    CC:       Dictation Confirmation Code: [id number]  Michael Agrawal Jr. MD  04/11/2018  10:47 AM

## 2018-04-19 ENCOUNTER — PATIENT MESSAGE (OUTPATIENT)
Dept: SPORTS MEDICINE | Facility: CLINIC | Age: 33
End: 2018-04-19

## 2018-04-19 ENCOUNTER — TELEPHONE (OUTPATIENT)
Dept: SPORTS MEDICINE | Facility: CLINIC | Age: 33
End: 2018-04-19

## 2018-04-19 DIAGNOSIS — M25.569 KNEE PAIN, UNSPECIFIED CHRONICITY, UNSPECIFIED LATERALITY: Primary | ICD-10-CM

## 2018-04-27 ENCOUNTER — CLINICAL SUPPORT (OUTPATIENT)
Dept: REHABILITATION | Facility: HOSPITAL | Age: 33
End: 2018-04-27
Payer: COMMERCIAL

## 2018-04-27 DIAGNOSIS — M25.569 KNEE PAIN, UNSPECIFIED CHRONICITY, UNSPECIFIED LATERALITY: ICD-10-CM

## 2018-04-27 PROCEDURE — 97161 PT EVAL LOW COMPLEX 20 MIN: CPT

## 2018-04-27 PROCEDURE — 97110 THERAPEUTIC EXERCISES: CPT

## 2018-04-27 NOTE — PROGRESS NOTES
Physical Therapy Evaluation    Name: Mela Blanco  Clinic Number: [id number]      Diagnosis:   Encounter Diagnosis   Name Primary?    Knee pain, unspecified chronicity, unspecified laterality      Physician: Leandra Patrick PA-C  Treatment Orders: PT Eval and Treat    Past Medical History:   Diagnosis Date    Bariatric surgery status 5/25/2015    Bilateral carpal tunnel syndrome 1/12/2018    Miscarriage      Current Outpatient Prescriptions   Medication Sig    caffeine (AWAKE) 200 mg Tab Take 200 mg by mouth every 4 (four) hours as needed.    cetirizine (ZYRTEC) 10 MG tablet TAKE 1 TABLET(10 MG) BY MOUTH EVERY DAY FOR ALLERGIES    levonorgestrel (MIRENA) 20 mcg/24 hr (5 years) IUD 1 each by Intrauterine route once.    meloxicam (MOBIC) 15 MG tablet Take 15 mg by mouth once daily.    omeprazole (PRILOSEC) 40 MG capsule Take 1 capsule (40 mg total) by mouth once daily.    phentermine (ADIPEX-P) 37.5 mg tablet     SIMETHICONE (PHAZYME ORAL) Take by mouth.     No current facility-administered medications for this visit.      Review of patient's allergies indicates:  No Known Allergies  Precautions: N/A    Evaluation Date: 4/27/2018  Visit # authorized: 1/25  Authorization period: 12/31/2018    Subjective     Onset/MARCELO: gradual    Primary concern/ Chief complaints:    Mela is a 32 y.o. female that presents to [facility] secondary to right knee pain.  Pt states she may have a partial ACL tear and her patella isn't tracking straight.  She notices it the most with prolonged walking and stairs.  She is a , so is walking, standing, in and out of the car and up/down stairs all day long.  X-ray and MRI were taken and revealed ACL strain/partial tear and lateral patella positioning. Pt uses Mobic to control symptoms. She has also begun taking a calcium supplement and is not sure if it or the Mobic is what helps.  Pt has a decreased ability to perform work duties  and daily activities and functional mobilities as desired.  Pt occasionally has numbness and tingling in 4th toe right foot after dropping something on it a few years ago. She also has some tendinitis in her left ankle and doesn't know if that is from overusing that side because of her knee pain.  No cultural, environmental, or spiritual barriers identified to treatment or learning.    Pain Scale: Mela rates pain on a scale of 0-10 to be 8 at worst with a lot of walking; 2 currently; 0 at best .    Patient Goals: Return to full functional mobility and ambulation without pain or difficulty.    Objective     Observation: Patient ambulates with normal gait pattern.    Functional tests:    SL Squat: Quad dominance (+) pain, knee valgus.   DL Squat: Quad dominance, (+) pain.   Step-down: Knee valgus, trunk lean, (+) pain.   SLS EO: Good.   SLS EC: Fair <10s B.    Range of Motion:   Knee Right active Left Active   Flexion WNL WNL   Extension WNL WNL     Lower Extremity Strength:  Right LE  Left LE    Quadriceps: 4/5 Quadriceps: 4+/5   Hamstrings: 4/5 Hamstrings: 4/5   Hip flexion (supine): 4+/5 Hip flexion (supine): 4+/5   Hip extension:  4/5 Hip extension: 4/5   Posterior Glute Med: 3+/5 PGM:  4/5     Special Tests:   Right   Patellar Grind (+)   Lateral Pull Sign  (+)     Joint Mobility: Mild hypomobility R patella especially medial glide.     Palpation: Point tenderness surrounding patella especially medially.    Sensation: Intact.    Flexibility:  Mildly restricted hamstring, gastrocs and piriformis B LEs.    Edema: None noted.    Functional Limitations Reports   Category: Mobility  Tool: FOTO Knee 39%    PT Evaluation Completed? Yes  Discussed Plan of Care with patient: Yes    TREATMENT:  Mela received therapeutic exercises to develop strength and endurance, flexibility for 15 minutes including:   Patient was 20 minutes late for today's appointment, so was unable to perform full treatment  SLR 10x2  GTB  Clam 10x2  Sidelying Clam GTB 10x2     Pt. Received cold pack with compression x 10 min. to right knee following treatment.    Instructed pt. regarding: Proper technique with all exercises. Pt demonstrated good understanding of the education provided. Mela demonstrated good return demonstration of activities.      Assessment     This is a 32 y.o. female referred to outpatient physical therapy and presents with a medical diagnosis of right patellofemoral pain syndrome.  Patient presents with signs and symptoms consistent with medical diagnosis exacerbated by poor hip and quad control.  Patient's chief complaint is pain and difficulty with daily and work duties.  Patient will benefit from skilled physical therapy services, specifically normalized flexibility, gross lower extremity and core strengthening, balance/proprioception training, progressing to functional mobility as tolerated. The following goals were discussed with the patient and patient is in agreement with them as to be addressed in the treatment plan. Patient was given a HEP consisting of exercises listed above. Patient verbally understood the instructions as they were given and demonstrated proper form and technique during therapy. Pt was advised to perform these exercises free of pain, and to stop performing them if pain occurs.     History  Co-morbidities and personal factors that may impact the plan of care Low    Stable Clinical Presentation   Co-morbidities:       Personal Factors:     Body regions    Body systems    Activity Limitations    Participation Restrictons   No personal factors and/ or  comorbidites          Address 1-2 elements:     Decreased strength  Decreased functional mobility       Medical necessity is demonstrated by the following IMPAIRMENTS/PROBLEM LIST:   1)Increase in pain level limiting function   2)Decreased strength   3)Decreased functional mobility   4)Decreased ability to work   5)Lack of HEP    GOALS: Short Term  Goals:  6-8 weeks  1.  Report decreased right knee pain  < / =  2/10  to increase tolerance for ADLs and work activities with minimal difficulty.  2.  Patient will demonstrate good technique with lateral step down in order to demonstrate good core and glute strength/control.  3.  Patient will report ability to work full work day with minimal pain or difficulty.  4.  Pt to tolerate HEP to improve ROM and independence with ADL's.  5.  Patient will report ability to negotiate stairs as desired without pain or difficulty.    Plan     Pt will be treated by physical therapy 1-2 times a week for Pt Education, HEP, therapeutic exercises, neuromuscular re-education, manual therapy, joint mobilizations, modalities prn to achieve established goals. Mela may at times be seen by a PTA as part of the Rehab Team.     Cont PT for 6-8 weeks.     Becca Woods, PT, DPT, SCS        I certify the need for these services furnished under this plan of treatment and while under my care.______________________________ Physician/Referring Practitioner  Date of Signature

## 2018-05-16 ENCOUNTER — OFFICE VISIT (OUTPATIENT)
Dept: ORTHOPEDICS | Facility: CLINIC | Age: 33
End: 2018-05-16
Payer: COMMERCIAL

## 2018-05-16 VITALS — BODY MASS INDEX: 32.9 KG/M2 | HEIGHT: 64 IN | WEIGHT: 192.69 LBS

## 2018-05-16 DIAGNOSIS — M76.822 POSTERIOR TIBIAL TENDINITIS OF LEFT LOWER EXTREMITY: Primary | ICD-10-CM

## 2018-05-16 PROCEDURE — 3008F BODY MASS INDEX DOCD: CPT | Mod: CPTII,S$GLB,, | Performed by: PHYSICIAN ASSISTANT

## 2018-05-16 PROCEDURE — 99999 PR PBB SHADOW E&M-EST. PATIENT-LVL III: CPT | Mod: PBBFAC,,, | Performed by: PHYSICIAN ASSISTANT

## 2018-05-16 PROCEDURE — 99213 OFFICE O/P EST LOW 20 MIN: CPT | Mod: S$GLB,,, | Performed by: PHYSICIAN ASSISTANT

## 2018-05-16 NOTE — PROGRESS NOTES
Subjective:      Patient ID: Mela Blanco is a 32 y.o. female.    Chief Complaint: No chief complaint on file.    HPI  Patient returns for f/u for her left ankle pain. She has been wearing the ankle brace for the past 4 weeks with the insert. She says the brace does help, but she still has some pain. It is increased in the evenings. She took mobic for 2 weeks and says it helped, but she started to have some stomach upset. She denies swelling. Her ankle feels better with the brace. She says her ankle has gotten a little better since last visit.   Review of Systems   Constitution: Negative for chills, fever and night sweats.   Cardiovascular: Negative for chest pain.   Respiratory: Negative for cough and shortness of breath.    Hematologic/Lymphatic: Does not bruise/bleed easily.   Skin: Negative for color change.   Gastrointestinal: Negative for heartburn.   Genitourinary: Negative for dysuria.   Neurological: Negative for numbness and paresthesias.   Psychiatric/Behavioral: Negative for altered mental status.   Allergic/Immunologic: Negative for persistent infections.         Objective:            General    Vitals reviewed.  Constitutional: She is oriented to person, place, and time. She appears well-developed and well-nourished.   Cardiovascular: Normal rate.    Neurological: She is alert and oriented to person, place, and time.         Left Ankle/Foot Exam     Inspection  Erythema: absent  Effusion: Ankle - absent     Tenderness   The patient is tender to palpation of the posterior tibial tendon.    Range of Motion   The patient has normal left ankle ROM.     Muscle Strength   The patient has normal left ankle strength.    Tests   Single Heel Rise: able to perform    Other   Sensation: normal    Comments:  Mild pain with single heel rise.       Vascular Exam       Left Pulses  Dorsalis Pedis:      2+                      Assessment:       Encounter Diagnosis   Name Primary?    Posterior tibial  tendinitis of left lower extremity Yes          Plan:       Patient will wean out of brace. Continue insert with good supportive shoes. Order placed for PT. RTC in 8 weeks for re-evaluation.

## 2018-05-28 ENCOUNTER — OFFICE VISIT (OUTPATIENT)
Dept: OPTOMETRY | Facility: CLINIC | Age: 33
End: 2018-05-28
Payer: COMMERCIAL

## 2018-05-28 DIAGNOSIS — H52.203 MYOPIA WITH ASTIGMATISM, BILATERAL: Primary | ICD-10-CM

## 2018-05-28 DIAGNOSIS — H52.13 MYOPIA WITH ASTIGMATISM, BILATERAL: Primary | ICD-10-CM

## 2018-05-28 PROCEDURE — 99499 UNLISTED E&M SERVICE: CPT | Mod: S$GLB,,, | Performed by: OPTOMETRIST

## 2018-05-28 NOTE — PROGRESS NOTES
ARABELLA GALVEZ 01/2018 Was due to come back in February for contact ck. Contacts   were comfortable and vision was good.  Patient through away last pair   because they were old.   Also needs DFE today    Last edited by Amador Coats, OD on 5/28/2018 11:40 AM. (History)            Assessment /Plan     For exam results, see Encounter Report.    Myopia with astigmatism, bilateral      1. Contact lens Rx released to pt. Daily wear only advised, with education to risks of extended wear.  Discussed lens care, compliance and solutions. RTC yearly contact lens follow up.

## 2018-07-11 LAB — HPV 16/18: NEGATIVE

## 2018-10-10 ENCOUNTER — TELEPHONE (OUTPATIENT)
Dept: ORTHOPEDICS | Facility: CLINIC | Age: 33
End: 2018-10-10

## 2018-10-10 NOTE — TELEPHONE ENCOUNTER
----- Message from Anitra Ott sent at 10/10/2018 12:36 PM CDT -----  Contact: pt            Name of Who is Calling: pt      What is the request in detail: pt states she was told by doctor to call in to schedule hand surgery when she was ready      Can the clinic reply by [facility]: no      What Number to Call Back if not in [facility]: [phone]

## 2018-10-12 DIAGNOSIS — G56.02 LEFT CARPAL TUNNEL SYNDROME: Primary | ICD-10-CM

## 2018-10-16 ENCOUNTER — HOSPITAL ENCOUNTER (OUTPATIENT)
Dept: PREADMISSION TESTING | Facility: OTHER | Age: 33
Discharge: HOME | End: 2018-10-16
Attending: PLASTIC SURGERY
Payer: COMMERCIAL

## 2018-10-16 ENCOUNTER — ANESTHESIA EVENT (OUTPATIENT)
Dept: SURGERY | Facility: OTHER | Age: 33
End: 2018-10-16
Payer: COMMERCIAL

## 2018-10-16 VITALS
SYSTOLIC BLOOD PRESSURE: 111 MMHG | RESPIRATION RATE: 16 BRPM | HEIGHT: 63 IN | DIASTOLIC BLOOD PRESSURE: 70 MMHG | OXYGEN SATURATION: 98 % | BODY MASS INDEX: 32.78 KG/M2 | HEART RATE: 70 BPM | TEMPERATURE: 99 F | WEIGHT: 185 LBS

## 2018-10-16 RX ORDER — IBUPROFEN AND FAMOTIDINE 26.6; 8 MG/1; MG/1
TABLET ORAL 3 TIMES DAILY
COMMUNITY
End: 2019-11-27

## 2018-10-16 RX ORDER — FAMOTIDINE 20 MG/1
20 TABLET, FILM COATED ORAL
Status: CANCELLED | OUTPATIENT
Start: 2018-10-16 | End: 2018-10-16

## 2018-10-16 RX ORDER — SODIUM CHLORIDE, SODIUM LACTATE, POTASSIUM CHLORIDE, CALCIUM CHLORIDE 600; 310; 30; 20 MG/100ML; MG/100ML; MG/100ML; MG/100ML
INJECTION, SOLUTION INTRAVENOUS CONTINUOUS
Status: CANCELLED | OUTPATIENT
Start: 2018-10-16

## 2018-10-16 NOTE — ANESTHESIA PREPROCEDURE EVALUATION
10/16/2018  Mela Blanco is a 32 y.o., female.    Anesthesia Evaluation    I have reviewed the Patient Summary Reports.    I have reviewed the Nursing Notes.   I have reviewed the Medications.     Review of Systems  Anesthesia Hx:  Denies Family Hx of Anesthesia complications.   Denies Personal Hx of Anesthesia complications.   Social:  Non-Smoker    Hematology/Oncology:  Hematology Normal   Oncology Normal     EENT/Dental:EENT/Dental Normal   Cardiovascular:  Cardiovascular Normal     Pulmonary:  Pulmonary Normal    Renal/:  Renal/ Normal     Hepatic/GI:  Hepatic/GI Normal Gastric band, lost over 100 lb    Takes phentermine, will have been off for 5 days   Musculoskeletal:  Musculoskeletal Normal    Neurological:   Neuromuscular Disease, (keith CTS)    Endocrine:  Endocrine Normal    Dermatological:  Skin Normal    Psych:  Psychiatric Normal           Physical Exam  General:  Obesity      Dental:  Dental Findings: In tact        Mental Status:  Mental Status Findings:  Cooperative, Alert and Oriented         Anesthesia Plan  Type of Anesthesia, risks & benefits discussed:  Anesthesia Type:  MAC  Patient's Preference:   Intra-op Monitoring Plan: standard ASA monitors  Intra-op Monitoring Plan Comments:   Post Op Pain Control Plan:   Post Op Pain Control Plan Comments:   Induction:   IV  Beta Blocker:         Informed Consent: Patient understands risks and agrees with Anesthesia plan.  Questions answered. Anesthesia consent signed with patient.  ASA Score: 2     Day of Surgery Review of History & Physical:    H&P update referred to the surgeon.     Anesthesia Plan Notes: IV propofol for local injection by surgeon          Ready For Surgery From Anesthesia Perspective.

## 2018-10-16 NOTE — DISCHARGE INSTRUCTIONS
PRE-ADMIT TESTING -  [phone]    [address]  [place] [facility]          Your surgery has been scheduled at [facility]. We are pleased to have the opportunity to serve you. For Further Information please call [phone].    On the day of surgery please report to the Information Desk on the 1st floor.    · CONTACT YOUR PHYSICIAN'S OFFICE THE DAY PRIOR TO YOUR SURGERY TO OBTAIN YOUR ARRIVAL TIME.     · The evening before surgery do not eat anything after 9 p.m. ( this includes hard candy, chewing gum and mints).  You may only have GATORADE, POWERADE AND WATER  from 9 p.m. until you leave your home.   DO NOT DRINK ANY LIQUIDS ON THE WAY TO THE HOSPITAL.      SPECIAL MEDICATION INSTRUCTIONS: TAKE medications checked off by the Anesthesiologist on your Medication List.    Angiogram Patients: Take medications as instructed by your physician, including aspirin.     Surgery Patients:    If you take ASPIRIN - Your PHYSICIAN/SURGEON will need to inform you IF/OR when you need to stop taking aspirin prior to your surgery.     Do Not take any medications containing IBUPROFEN.  Do Not Wear any make-up or dark nail polish   (especially eye make-up) to surgery. If you come to surgery with makeup on you will be required to remove the makeup or nail polish.    Do not shave your surgical area at least 5 days prior to your surgery. The surgical prep will be performed at the hospital according to Infection Control regulations.    Leave all valuables at home.   Do Not wear any jewelry or watches, including any metal in body piercings.  Contact Lens must be removed before surgery. Either do not wear the contact lens or bring a case and solution for storage.  Please bring a container for eyeglasses or dentures as required.  Bring any paperwork your physician has provided, such as consent forms,  history and physicals, doctor's orders, etc.   Bring comfortable clothes that are loose fitting to wear upon  discharge. Take into consideration the type of surgery being performed.  Maintain your diet as advised per your physician the day prior to surgery.      Adequate rest the night before surgery is advised.   Park in the Parking lot behind the hospital or in the [facility] across the street from the parking lot. Parking is complimentary.  If you will be discharged the same day as your procedure, please arrange for a responsible adult to drive you home or to accompany you if traveling by taxi.   YOU WILL NOT BE PERMITTED TO DRIVE OR TO LEAVE THE HOSPITAL ALONE AFTER SURGERY.   It is strongly recommended that you arrange for someone to remain with you for the first 24 hrs following your surgery.       Thank you for your cooperation.  The Staff of [facility].        Bathing Instructions                                                                 Please shower the evening before and morning of your procedure with    ANTIBACTERIAL SOAP. ( DIAL, etc )  Concentrate on the surgical area   for at least 3 minutes and rinse completely. Dry off as usual.   Do not use any deodorant, powder, body lotions, perfume, after shave or    cologne.

## 2018-10-18 ENCOUNTER — TELEPHONE (OUTPATIENT)
Dept: ORTHOPEDICS | Facility: CLINIC | Age: 33
End: 2018-10-18

## 2018-10-18 ENCOUNTER — TELEPHONE (OUTPATIENT)
Dept: INTERNAL MEDICINE | Facility: CLINIC | Age: 33
End: 2018-10-18

## 2018-10-18 DIAGNOSIS — Z98.84 BARIATRIC SURGERY STATUS: ICD-10-CM

## 2018-10-18 DIAGNOSIS — Z83.3 FAMILY HISTORY OF TYPE 2 DIABETES MELLITUS: ICD-10-CM

## 2018-10-18 DIAGNOSIS — Z00.00 ROUTINE MEDICAL EXAM: Primary | ICD-10-CM

## 2018-10-18 NOTE — TELEPHONE ENCOUNTER
----- Message from Odette Swartz sent at 10/18/2018  1:45 PM CDT -----  Doctor appointment and lab have been scheduled.  Please link lab orders to the lab appointment.  Date of doctor appointment:  11/12/18  Physical or EP:  annual  Date of lab appointment:  10/23/18  Comments:

## 2018-10-18 NOTE — TELEPHONE ENCOUNTER
Spoke with pt, 9:50 am arrival time for surgery tomorrow with . [facility] , 1st floor check in. Pt verbalized understanding.

## 2018-10-19 ENCOUNTER — HOSPITAL ENCOUNTER (OUTPATIENT)
Facility: OTHER | Age: 33
Discharge: HOME | End: 2018-10-19
Attending: PLASTIC SURGERY | Admitting: PLASTIC SURGERY
Payer: COMMERCIAL

## 2018-10-19 ENCOUNTER — ANESTHESIA (OUTPATIENT)
Dept: SURGERY | Facility: OTHER | Age: 33
End: 2018-10-19
Payer: COMMERCIAL

## 2018-10-19 VITALS
WEIGHT: 185 LBS | DIASTOLIC BLOOD PRESSURE: 71 MMHG | OXYGEN SATURATION: 99 % | SYSTOLIC BLOOD PRESSURE: 130 MMHG | HEIGHT: 63 IN | BODY MASS INDEX: 32.78 KG/M2 | TEMPERATURE: 98 F | RESPIRATION RATE: 20 BRPM | HEART RATE: 75 BPM

## 2018-10-19 DIAGNOSIS — G56.02 LEFT CARPAL TUNNEL SYNDROME: Primary | ICD-10-CM

## 2018-10-19 LAB
B-HCG UR QL: NEGATIVE
CTP QC/QA: YES

## 2018-10-19 PROCEDURE — 25000003 PHARM REV CODE 250: Performed by: ANESTHESIOLOGY

## 2018-10-19 PROCEDURE — 63600175 PHARM REV CODE 636 W HCPCS: Performed by: NURSE ANESTHETIST, CERTIFIED REGISTERED

## 2018-10-19 PROCEDURE — 71000015 HC POSTOP RECOV 1ST HR: Performed by: PLASTIC SURGERY

## 2018-10-19 PROCEDURE — 37000008 HC ANESTHESIA 1ST 15 MINUTES: Performed by: PLASTIC SURGERY

## 2018-10-19 PROCEDURE — 36000707: Performed by: PLASTIC SURGERY

## 2018-10-19 PROCEDURE — 64721 CARPAL TUNNEL SURGERY: CPT | Mod: LT,,, | Performed by: PLASTIC SURGERY

## 2018-10-19 PROCEDURE — 37000009 HC ANESTHESIA EA ADD 15 MINS: Performed by: PLASTIC SURGERY

## 2018-10-19 PROCEDURE — 25000003 PHARM REV CODE 250: Performed by: NURSE ANESTHETIST, CERTIFIED REGISTERED

## 2018-10-19 PROCEDURE — 81025 URINE PREGNANCY TEST: CPT | Performed by: ANESTHESIOLOGY

## 2018-10-19 PROCEDURE — 25000003 PHARM REV CODE 250: Performed by: PLASTIC SURGERY

## 2018-10-19 PROCEDURE — 36000706: Performed by: PLASTIC SURGERY

## 2018-10-19 RX ORDER — SODIUM CHLORIDE 0.9 % (FLUSH) 0.9 %
5 SYRINGE (ML) INJECTION
Status: DISCONTINUED | OUTPATIENT
Start: 2018-10-19 | End: 2018-10-19 | Stop reason: HOSPADM

## 2018-10-19 RX ORDER — DEXTROMETHORPHAN HYDROBROMIDE, GUAIFENESIN 5; 100 MG/5ML; MG/5ML
650 LIQUID ORAL EVERY 8 HOURS
COMMUNITY
End: 2019-11-27

## 2018-10-19 RX ORDER — SODIUM CHLORIDE, SODIUM LACTATE, POTASSIUM CHLORIDE, CALCIUM CHLORIDE 600; 310; 30; 20 MG/100ML; MG/100ML; MG/100ML; MG/100ML
INJECTION, SOLUTION INTRAVENOUS CONTINUOUS
Status: DISCONTINUED | OUTPATIENT
Start: 2018-10-19 | End: 2018-10-19 | Stop reason: HOSPADM

## 2018-10-19 RX ORDER — FENTANYL CITRATE 50 UG/ML
INJECTION, SOLUTION INTRAMUSCULAR; INTRAVENOUS
Status: DISCONTINUED | OUTPATIENT
Start: 2018-10-19 | End: 2018-10-19

## 2018-10-19 RX ORDER — ONDANSETRON 2 MG/ML
INJECTION INTRAMUSCULAR; INTRAVENOUS
Status: DISCONTINUED | OUTPATIENT
Start: 2018-10-19 | End: 2018-10-19

## 2018-10-19 RX ORDER — MIDAZOLAM HYDROCHLORIDE 1 MG/ML
INJECTION INTRAMUSCULAR; INTRAVENOUS
Status: DISCONTINUED | OUTPATIENT
Start: 2018-10-19 | End: 2018-10-19

## 2018-10-19 RX ORDER — PROPOFOL 10 MG/ML
VIAL (ML) INTRAVENOUS
Status: DISCONTINUED | OUTPATIENT
Start: 2018-10-19 | End: 2018-10-19

## 2018-10-19 RX ORDER — FAMOTIDINE 20 MG/1
20 TABLET, FILM COATED ORAL
Status: COMPLETED | OUTPATIENT
Start: 2018-10-19 | End: 2018-10-19

## 2018-10-19 RX ORDER — BUPIVACAINE HYDROCHLORIDE 2.5 MG/ML
INJECTION, SOLUTION EPIDURAL; INFILTRATION; INTRACAUDAL
Status: DISCONTINUED | OUTPATIENT
Start: 2018-10-19 | End: 2018-10-19 | Stop reason: HOSPADM

## 2018-10-19 RX ORDER — MUPIROCIN 20 MG/G
1 OINTMENT TOPICAL 2 TIMES DAILY
Status: DISCONTINUED | OUTPATIENT
Start: 2018-10-19 | End: 2018-10-19 | Stop reason: HOSPADM

## 2018-10-19 RX ORDER — HYDROCODONE BITARTRATE AND ACETAMINOPHEN 5; 325 MG/1; MG/1
1 TABLET ORAL EVERY 4 HOURS PRN
Status: DISCONTINUED | OUTPATIENT
Start: 2018-10-19 | End: 2018-10-19 | Stop reason: HOSPADM

## 2018-10-19 RX ORDER — HYDROCODONE BITARTRATE AND ACETAMINOPHEN 5; 325 MG/1; MG/1
1 TABLET ORAL EVERY 6 HOURS PRN
Qty: 30 TABLET | Refills: 0 | Status: SHIPPED | OUTPATIENT
Start: 2018-10-19 | End: 2019-11-27

## 2018-10-19 RX ORDER — LIDOCAINE HYDROCHLORIDE AND EPINEPHRINE 10; 10 MG/ML; UG/ML
INJECTION, SOLUTION INFILTRATION; PERINEURAL
Status: DISCONTINUED | OUTPATIENT
Start: 2018-10-19 | End: 2018-10-19 | Stop reason: HOSPADM

## 2018-10-19 RX ADMIN — PROPOFOL 20 MG: 10 INJECTION, EMULSION INTRAVENOUS at 12:10

## 2018-10-19 RX ADMIN — MIDAZOLAM HYDROCHLORIDE 2 MG: 1 INJECTION, SOLUTION INTRAMUSCULAR; INTRAVENOUS at 12:10

## 2018-10-19 RX ADMIN — SODIUM CHLORIDE, SODIUM LACTATE, POTASSIUM CHLORIDE, AND CALCIUM CHLORIDE: 600; 310; 30; 20 INJECTION, SOLUTION INTRAVENOUS at 11:10

## 2018-10-19 RX ADMIN — ONDANSETRON 4 MG: 2 INJECTION INTRAMUSCULAR; INTRAVENOUS at 12:10

## 2018-10-19 RX ADMIN — PROPOFOL 100 MG: 10 INJECTION, EMULSION INTRAVENOUS at 12:10

## 2018-10-19 RX ADMIN — FENTANYL CITRATE 100 MCG: 50 INJECTION, SOLUTION INTRAMUSCULAR; INTRAVENOUS at 12:10

## 2018-10-19 RX ADMIN — FAMOTIDINE 20 MG: 20 TABLET ORAL at 11:10

## 2018-10-19 RX ADMIN — DEXTROSE 2 G: 50 INJECTION, SOLUTION INTRAVENOUS at 12:10

## 2018-10-19 NOTE — H&P
Chief Complaint: No chief complaint on file.      HPI:    Mela Blanco is a right hand dominant 32 y.o. female presenting today for left carpal tunnel release.  She denies any changes in symptoms or medical history.    Past Medical History:   Diagnosis Date    Bariatric surgery status 5/25/2015    Bilateral carpal tunnel syndrome 1/12/2018    Miscarriage        Past Surgical History:   Procedure Laterality Date    breast reduction      BREAST SURGERY      CARPAL TUNNEL RELEASE Right 01/12/2018    COSMETIC SURGERY      hematoma surgery      LAPAROSCOPIC GASTRIC BANDING      RELEASE-CARPAL TUNNEL Right 1/12/2018    Performed by Michael Agrawal Jr., MD at [facility] OR        Family History   Problem Relation Age of Onset    Diabetes Mother     Hypertension Mother     Hyperlipidemia Mother     Glaucoma Mother     Arthritis Father     Hypertension Father     Diabetes Maternal Grandmother     Amblyopia Neg Hx     Blindness Neg Hx     Cancer Neg Hx     Cataracts Neg Hx     Macular degeneration Neg Hx     Retinal detachment Neg Hx     Strabismus Neg Hx     Stroke Neg Hx     Thyroid disease Neg Hx        Social History     Socioeconomic History    Marital status: Single     Spouse name: None    Number of children: 1    Years of education: None    Highest education level: None   Social Needs    Financial resource strain: None    Food insecurity - worry: None    Food insecurity - inability: None    Transportation needs - medical: None    Transportation needs - non-medical: None   Occupational History    None   Tobacco Use    Smoking status: Never Smoker    Smokeless tobacco: Never Used   Substance and Sexual Activity    Alcohol use: Yes     Comment: occasional.    Drug use: No    Sexual activity: None   Other Topics Concern    None   Social History Narrative    None       Review of patient's allergies indicates:  No Known Allergies      Current Facility-Administered  "Medications:     lactated ringers infusion, , Intravenous, Continuous, David Brown MD        Review of Systems:  Constitutional: no fever or chills  ENT: no nasal congestion or sore throat  Respiratory: no cough or shortness of breath  Cardiovascular: no chest pain or palpitations  Gastrointestinal: no nausea or vomiting, PUD, GERD, NSAID intolerance  Genitourinary: no hematuria or dysuria  Integument/Breast: no rash or pruritis  Hematologic/Lymphatic: no easy bruising or lymphadenopathy  Musculoskeletal: see HPI  Neurological: no seizures or tremors  Behavioral/Psych: no auditory or visual hallucinations      Objective:      PHYSICAL EXAM:  Vitals:    10/16/18 1431 10/19/18 1055   BP:  133/87   Pulse:  79   Resp:  18   Temp:  98.5 °F (36.9 °C)   TempSrc:  Oral   SpO2:  100%   Weight: 83.9 kg (184 lb 16 oz) 83.9 kg (185 lb)   Height: 5' 3" (1.6 m) 5' 3" (1.6 m)     General Appearance: WDWN, NAD  Neuro/Psych: Mood & affect appropriate  Lungs: Respirations equal and unlabored.   CV: No apparent CV dysfunction, distal pulses intact, RRR  Skin: Intact throughout  Extremities:   Left Hand Exam     Tests   Phalens sign: positive  Tinel's sign (median nerve): positive    Other   Sensation: normal  Pulse: present              Assessment:   Left carpal tunnel syndrome      Plan/Discussion:   To OR for left carpal tunnel release  Consent on chart         "

## 2018-10-19 NOTE — ANESTHESIA POSTPROCEDURE EVALUATION
"Anesthesia Post Evaluation    Patient: Mela Blanco    Procedure(s) Performed: Procedure(s) (LRB):  RELEASE, CARPAL TUNNEL (Left)    Final Anesthesia Type: general  Patient location during evaluation: [facility]  Patient participation: Yes- Able to Participate  Level of consciousness: awake and alert  Post-procedure vital signs: reviewed and stable  Pain management: adequate  Airway patency: patent  PONV status at discharge: No PONV  Anesthetic complications: no      Cardiovascular status: blood pressure returned to baseline  Respiratory status: unassisted and spontaneous ventilation  Hydration status: euvolemic  Follow-up not needed.        Visit Vitals  /87 (BP Location: Right arm, Patient Position: Sitting)   Pulse 79   Temp 36.9 °C (98.5 °F) (Oral)   Resp 18   Ht 5' 3" (1.6 m)   Wt 83.9 kg (185 lb)   SpO2 100%   Breastfeeding? No   BMI 32.77 kg/m²       Pain/Cecy Score: No Data Recorded      "

## 2018-10-19 NOTE — OP NOTE
DATE OF PROCEDURE:  10/19/2018.     PREOPERATIVE DIAGNOSIS:  Left carpal tunnel syndrome.     POSTOPERATIVE DIAGNOSIS:  Left carpal tunnel syndrome.     PROCEDURE:  Left open carpal tunnel release.     SURGEON:  Michael Agrawal Jr., M.D.     ANESTHESIA:  Local with MAC.     FLUIDS:  300 mL crystalloid.     ESTIMATED BLOOD LOSS:  Minimal.     SPECIMENS:  None.     FINDINGS:  Tight transverse retinacular ligament of the left carpal tunnel.     IMPLANTS AND GRAFTS:  None.     COMPLICATIONS:  None.     DISPOSITION:  To PACU, then home.     INDICATIONS:  Mela Blanco is a 32 y.o. female   with history of right carpal tunnel release with left carpal tunnel syndrome.  The patient failed   conservative treatment options; therefore, we discussed performing a left open   carpal tunnel release.  We discussed risks, benefits and alternatives in detail and the patient   wished to proceed.  Informed consent was obtained and the patient was then   scheduled for the procedure.     PROCEDURE IN DETAIL:  After proper identification of Ms. Plaza in the   preoperative area, the patient was wheeled to the Operating Room on a hospital   stretcher.  Pressure points were padded and checked this time.  A timeout was   called when surgeons, nurses, and Anesthesia agreed upon the patient and   procedure.  She was given 2 g of Ancef for prophylactic antibiotics.  A padded   18-inch tourniquet was placed to her left forearm and the patient was induced   under MAC sedation.  Once the patient was sedated, 10 mL of 1% lidocaine with   epinephrine was injected into the carpal tunnel and into the subcutaneous tissue   of the palm.  The hand was then prepped and draped in usual sterile fashion.    An Esmarch tourniquet was used to exsanguinate the hand and tourniquet was   inflated to 250 mmHg.  Longitudinal incision was made using a #15 blade scalpel   over the carpal tunnel through the skin, dermis, and subcutaneous tissue.  The    palmaris fascia was then divided longitudinally to expose the flexor retinacular   ligament.  The ligament was then divided under direct visualization with care   not to injure the underlying structure using a #15 blade scalpel.  Next,   antebrachial fasciotomy was performed in a distal to proximal direction under   direct visualization.  The nerve was then inspected and was found to be in   continuity and uninjured.  At this time, I was then satisfied with the release   of the carpal tunnel.  The tourniquet was then released and hemostasis was   achieved using electrocautery.  The wound was then irrigated with copious   amounts of normal saline and then closed using 4-0 nylon sutures in an   interrupted fashion.  The wounds were then cleaned and dried.  A 0.25% Marcaine   was injected for postoperative anesthesia.  Xeroform was then applied followed   by dry sterile splint with the wrist in neutral and fingers free.  This   concluded the procedure.  The patient tolerated the procedure well without any   complications.  At the end of the case, needle, sponge counts were correct x2   and I was present and scrubbed all aspects of procedure.  She was then taken to   PACU for further recovery.

## 2018-10-19 NOTE — BRIEF OP NOTE
[facility]  Brief Operative Note     SUMMARY     Surgery Date: 10/19/2018     Surgeon(s) and Role:     * Michael Agrawal Jr., MD - Primary    Assisting Surgeon: None    Pre-op Diagnosis:  Left carpal tunnel syndrome [G56.02]    Post-op Diagnosis:  Post-Op Diagnosis Codes:     * Left carpal tunnel syndrome [G56.02]    Procedure(s) (LRB):  RELEASE, CARPAL TUNNEL (Left)    Anesthesia: Local MAC    Description of the findings of the procedure: left carpal tunnel syndrome    Findings/Key Components: same    Estimated Blood Loss: * No values recorded between 10/19/2018 12:22 PM and 10/19/2018 12:39 PM *         Specimens:   Specimen (12h ago, onward)    None          Discharge Note    SUMMARY     Admit Date: 10/19/2018    Discharge Date and Time:  10/19/2018 12:40 PM    Hospital Course (synopsis of major diagnoses, care, treatment, and services provided during the course of the hospital stay): pt admitted for outpatient surgery     Final Diagnosis: Post-Op Diagnosis Codes:     * Left carpal tunnel syndrome [G56.02]    Disposition: Home or Self Care    Follow Up/Patient Instructions:     Medications:  Reconciled Home Medications:      Medication List      START taking these medications    HYDROcodone-acetaminophen 5-325 mg per tablet  Commonly known as:  NORCO  Take 1 tablet by mouth every 6 (six) hours as needed for Pain.        CONTINUE taking these medications    acetaminophen 650 MG Tbsr  Commonly known as:  TYLENOL  Take 650 mg by mouth every 8 (eight) hours.     AWAKE 200 mg Tab  Generic drug:  caffeine  Take 200 mg by mouth every 4 (four) hours as needed.     cetirizine 10 MG tablet  Commonly known as:  ZYRTEC  TAKE 1 TABLET(10 MG) BY MOUTH EVERY DAY FOR ALLERGIES     ibuprofen-famotidine 800-26.6 mg Tab  Commonly known as:  DUEXIS  Take by mouth 3 (three) times daily.     levonorgestrel 20 mcg/24 hr (5 years) IUD  Commonly known as:  MIRENA  1 each by Intrauterine route once.     omeprazole  40 MG capsule  Commonly known as:  PRILOSEC  Take 1 capsule (40 mg total) by mouth once daily.     phentermine 37.5 mg tablet  Commonly known as:  ADIPEX-P          Discharge Procedure Orders   Diet general     Call MD for:  temperature >100.4     Call MD for:  persistent nausea and vomiting     Call MD for:  severe uncontrolled pain     Call MD for:  difficulty breathing, headache or visual disturbances     Call MD for:  redness, tenderness, or signs of infection (pain, swelling, redness, odor or green/yellow discharge around incision site)     Call MD for:  hives     Call MD for:  persistent dizziness or light-headedness     Call MD for:  extreme fatigue     Keep surgical extremity elevated     Lifting restrictions     No driving, operating heavy equipment or signing legal documents while taking pain medication.     Change dressing (specify)   Order Comments: Remove splint and dressings in one week then you may wash your hand with soap and water     Follow-up Information     Michael Agrawal Jr, MD In 2 weeks.    Specialties:  Plastic Surgery, Hand Surgery  Why:  For suture removal  Contact information:  [facility] [address]  [facility] [id number] [phone]

## 2018-11-15 ENCOUNTER — TELEPHONE (OUTPATIENT)
Dept: SPORTS MEDICINE | Facility: CLINIC | Age: 33
End: 2018-11-15

## 2018-11-29 DIAGNOSIS — K21.9 GASTROESOPHAGEAL REFLUX DISEASE, ESOPHAGITIS PRESENCE NOT SPECIFIED: ICD-10-CM

## 2019-04-04 ENCOUNTER — OFFICE VISIT (OUTPATIENT)
Dept: SPORTS MEDICINE | Facility: CLINIC | Age: 34
End: 2019-04-04
Payer: COMMERCIAL

## 2019-04-04 ENCOUNTER — HOSPITAL ENCOUNTER (OUTPATIENT)
Dept: RADIOLOGY | Facility: HOSPITAL | Age: 34
Discharge: HOME | End: 2019-04-04
Attending: PHYSICIAN ASSISTANT
Payer: COMMERCIAL

## 2019-04-04 VITALS — HEIGHT: 63 IN | BODY MASS INDEX: 32.78 KG/M2 | WEIGHT: 185 LBS

## 2019-04-04 DIAGNOSIS — M25.561 RIGHT KNEE PAIN, UNSPECIFIED CHRONICITY: ICD-10-CM

## 2019-04-04 DIAGNOSIS — M23.91 INTERNAL DERANGEMENT OF RIGHT KNEE: ICD-10-CM

## 2019-04-04 DIAGNOSIS — M25.561 RIGHT KNEE PAIN, UNSPECIFIED CHRONICITY: Primary | ICD-10-CM

## 2019-04-04 DIAGNOSIS — M17.11 PRIMARY OSTEOARTHRITIS OF RIGHT KNEE: ICD-10-CM

## 2019-04-04 PROCEDURE — 73564 X-RAY EXAM KNEE 4 OR MORE: CPT | Mod: TC,50,FY,PO

## 2019-04-04 PROCEDURE — 20610 PR DRAIN/INJECT LARGE JOINT/BURSA: ICD-10-PCS | Mod: RT,S$GLB,, | Performed by: PHYSICIAN ASSISTANT

## 2019-04-04 PROCEDURE — 99999 PR PBB SHADOW E&M-EST. PATIENT-LVL III: CPT | Mod: PBBFAC,,, | Performed by: PHYSICIAN ASSISTANT

## 2019-04-04 PROCEDURE — 99214 OFFICE O/P EST MOD 30 MIN: CPT | Mod: 25,S$GLB,, | Performed by: PHYSICIAN ASSISTANT

## 2019-04-04 PROCEDURE — 3008F BODY MASS INDEX DOCD: CPT | Mod: CPTII,S$GLB,, | Performed by: PHYSICIAN ASSISTANT

## 2019-04-04 PROCEDURE — 3008F PR BODY MASS INDEX (BMI) DOCUMENTED: ICD-10-PCS | Mod: CPTII,S$GLB,, | Performed by: PHYSICIAN ASSISTANT

## 2019-04-04 PROCEDURE — 99214 PR OFFICE/OUTPT VISIT, EST, LEVL IV, 30-39 MIN: ICD-10-PCS | Mod: 25,S$GLB,, | Performed by: PHYSICIAN ASSISTANT

## 2019-04-04 PROCEDURE — 73564 X-RAY EXAM KNEE 4 OR MORE: CPT | Mod: 26,,, | Performed by: RADIOLOGY

## 2019-04-04 PROCEDURE — 20610 DRAIN/INJ JOINT/BURSA W/O US: CPT | Mod: RT,S$GLB,, | Performed by: PHYSICIAN ASSISTANT

## 2019-04-04 PROCEDURE — 99999 PR PBB SHADOW E&M-EST. PATIENT-LVL III: ICD-10-PCS | Mod: PBBFAC,,, | Performed by: PHYSICIAN ASSISTANT

## 2019-04-04 PROCEDURE — 73564 XR KNEE ORTHO BILAT WITH FLEXION: ICD-10-PCS | Mod: 26,,, | Performed by: RADIOLOGY

## 2019-04-04 RX ORDER — TRIAMCINOLONE ACETONIDE 40 MG/ML
40 INJECTION, SUSPENSION INTRA-ARTICULAR; INTRAMUSCULAR
Status: COMPLETED | OUTPATIENT
Start: 2019-04-04 | End: 2019-04-04

## 2019-04-04 RX ORDER — BUPIVACAINE HYDROCHLORIDE 2.5 MG/ML
3 INJECTION, SOLUTION INFILTRATION; PERINEURAL
Status: COMPLETED | OUTPATIENT
Start: 2019-04-04 | End: 2019-04-04

## 2019-04-04 RX ADMIN — BUPIVACAINE HYDROCHLORIDE 7.5 MG: 2.5 INJECTION, SOLUTION INFILTRATION; PERINEURAL at 12:04

## 2019-04-04 RX ADMIN — TRIAMCINOLONE ACETONIDE 40 MG: 40 INJECTION, SUSPENSION INTRA-ARTICULAR; INTRAMUSCULAR at 12:04

## 2019-04-04 NOTE — PROGRESS NOTES
Subjective:          Chief Complaint: Mela Blanco is a 33 y.o. female who had concerns including Pain of the Right Knee.      Pain   Pertinent negatives include no abdominal pain, chest pain, chills, congestion, coughing, fever, headaches, joint swelling, myalgias, nausea, numbness, rash, sore throat or vomiting.      Patient who is a  presents to clinic with worsening right knee pain over the past 4-5 months. She has had right knee pain x 3-4 years. Pain is worse with walking, standing, kneeling, stairs, and increased activity. Pain is better with rest. She has been experiencing swelling in her right knee towards the end of the day. Pain is located in the posterior knee and above patella. She reports increased crepitus in her knees. Pain at rest is 0/10. Pain with any activity is 4-5/10. She has been taking Ibuprofen 800mg one to two times a day. -mechanical symptoms,  - instability. She has never had an injection in her knee. She is requesting knee injections. She completed one session of formal PT and completed a HEP 2-3 times a week for 6 months about a year ago.      Review of Systems   Constitution: Negative. Negative for chills, fever, weight gain and weight loss.   HENT: Negative for congestion and sore throat.    Eyes: Negative for blurred vision and double vision.   Cardiovascular: Negative for chest pain, leg swelling and palpitations.   Respiratory: Negative for cough and shortness of breath.    Hematologic/Lymphatic: Does not bruise/bleed easily.   Skin: Negative for itching, poor wound healing and rash.   Musculoskeletal: Positive for joint pain and stiffness. Negative for back pain, joint swelling, muscle weakness and myalgias.   Gastrointestinal: Negative for abdominal pain, constipation, diarrhea, nausea and vomiting.   Genitourinary: Negative.  Negative for frequency and hematuria.   Neurological: Negative for dizziness, headaches, numbness, paresthesias and sensory  change.   Psychiatric/Behavioral: Negative for altered mental status and depression. The patient is not nervous/anxious.    Allergic/Immunologic: Negative for hives.       Pain Related Questions  Over the past 3 days, what was your average pain during activity? (I.e. running, jogging, walking, climbing stairs, getting dressed, ect.): 7  Over the past 3 days, what was your highest pain level?: 7  Over the past 3 days, what was your lowest pain level? : 6    Other  How many nights a week are you awakened by your affected body part?: 0  Was the patient's HEIGHT measured or patient reported?: Patient Reported  Was the patient's WEIGHT measured or patient reported?: Measured      Objective:        General: Mela is well-developed, well-nourished, appears stated age, in no acute distress, alert and oriented to time, place and person.     General    Vitals reviewed.  Constitutional: She is oriented to person, place, and time. She appears well-developed and well-nourished. No distress.   HENT:   Head: Normocephalic and atraumatic.   Eyes: EOM are normal.   Neck: Normal range of motion.   Cardiovascular: Normal rate and regular rhythm.    Pulmonary/Chest: Effort normal. No respiratory distress.   Neurological: She is alert and oriented to person, place, and time. She has normal reflexes. No cranial nerve deficit. Coordination normal.   Psychiatric: She has a normal mood and affect. Her behavior is normal. Judgment and thought content normal.     General Musculoskeletal Exam   Gait: normal       Right Knee Exam     Inspection   Erythema: absent  Scars: absent  Swelling: absent  Effusion: present  Deformity: absent  Bruising: absent    Tenderness   The patient is tender to palpation of the patella and medial hamstring.    Range of Motion   Extension:  0 normal   Flexion:  130 abnormal     Tests   Meniscus   Tracy:  Medial - negative Lateral - positive  Ligament Examination Lachman: normal (-1 to 2mm) PCL-Posterior  Drawer: normal (0 to 2mm)     MCL - Valgus: normal (0 to 2mm)  LCL - Varus: normalPivot Shift: normal (Equal)Reverse Pivot Shift: normal (Equal)Dial Test at 30 degrees: normal (< 5 degrees)Dial Test at 90 degrees: normal (< 5 degrees)  Posterolateral Corner: unstable (>15 degrees difference)  Patella   Passive Patellar Tilt: neutral  Patellar Glide (quadrants): Lateral - 1   Medial - 2  Patellar Grind: positive    Other   Sensation: normal    Left Knee Exam     Inspection   Erythema: absent  Scars: absent  Swelling: absent  Effusion: absent  Deformity: absent  Bruising: absent    Tenderness   The patient is experiencing no tenderness.     Crepitus   The patient has crepitus of the patella.    Range of Motion   Extension:  0 normal   Flexion:  140 normal     Tests   Meniscus   Tracy:  Medial - negative Lateral - negative  Stability Lachman: normal (-1 to 2mm) PCL-Posterior Drawer: normal (0 to 2mm)  MCL - Valgus: normal (0 to 2mm)  LCL - Varus: normal (0 to 2mm)Pivot Shift: normal (Equal)Reverse Pivot Shift: normal (Equal)Dial Test at 30 degrees: normal (< 5 degrees)Dial Test at 90 degrees: normal (< 5 degrees)  Posterolateral Corner: unstable (>15 degrees difference)  Patella   Passive Patellar Tilt: neutral  Patellar Glide (Quadrants): Lateral - 1 Medial - 2  Patellar Grind: negative    Other   Sensation: normal    Muscle Strength   Right Lower Extremity   Hip Abduction: 5/5   Quadriceps:  5/5   Hamstrin/5   Left Lower Extremity   Hip Abduction: 5/5   Quadriceps:  5/5   Hamstrin/5     Reflexes     Left Side  Quadriceps:  2+  Achilles:  2+    Right Side   Quadriceps:  2+  Achilles:  2+    Vascular Exam     Right Pulses  Dorsalis Pedis:      2+  Posterior Tibial:      2+        Left Pulses  Dorsalis Pedis:      2+  Posterior Tibial:      2+          RADIOGRAPHS:  Bilateral knees:  FINDINGS:  No fracture or dislocation.  No bone destruction identified.  Mild patellofemoral misalignment.    MRI results  right knee:  Findings:     Menisci:  No signal abnormality to suggest meniscal tear.     Cruciate ligaments: Abnormally thickened ACL with increased T2 signal without evidence of tendon disruption.  Small intracondylar cyst near the femoral attachment along the posterior femoral condyle and associated moderate degree of bone marrow edema within the tibial plateau. PCL is intact.     Collateral ligaments: The medial and lateral collateral ligaments are intact.    Extensor mechanism: No tear.  Patellar and quadriceps tendons are intact.    Cartilage: Mild fissuring of the cartilage within the weightbearing aspect of the medial tibial femoral compartment and mild heterogeneous T2 signal within the lateral patellofemoral compartment. No full thickness cartilaginous defect.    Marrow: No additional bone marrow edema. No fractures.    Joint fluid and synovium: Small suprapatellar joint effusion.  No synovial abnormality.    Other: Mild lateral subluxation of the patella.  No evidence of injury to the medial patellar retinaculum or MPFL, noting mild soft tissue edema signal along the lateral aspect of the inferior patellar pole.  No definite trochlear dysplasia.        Assessment:       Encounter Diagnoses   Name Primary?    Right knee pain, unspecified chronicity Yes    Primary osteoarthritis of right knee     Internal derangement of right knee           Plan:       1. I made the decision to obtain old records of the patient including previous notes and imaging. New imaging was ordered today of the extremity or extremities evaluated. I independently reviewed and interpreted the radiographs and MRI today as well as prior imaging. Reviewed MRI with patient in great detail and discussed possible treatment options including HEP, formal PT, injections (steroid vs visco), bracing, NSAIDS, and knee arthroscopy with possible ACL reconstruction if unstable.    2. Continue Ibuprofen as needed for pain    3. Note provided for  work that she missed this week due to knee pain.    4. Continue viscoskin    5. [id number] placed for right knee Synvisc One injection    6. Injection Procedure right knee  A time out was performed, including verification of patient ID, procedure, site and side, availability of information and equipment, review of safety issues, and agreement with consent, the procedure site was marked.    After time out was performed, the patient was prepped aseptically with povidone-iodine swabsticks. A diagnostic and therapeutic injection of 1:3cc Kenalog/Marcaine was given under sterile technique using a 22g x 1.5 needle from the Superolateral  aspect of the right Knee Joint in the supine position.      Mela Blanco had no adverse reactions to the medication. Pain decreased. She was instructed to apply ice to the joint for 20 minutes and avoid strenuous activities for 24-36 hours following the injection. She was warned of possible blood sugar and/or blood pressure changes during that time. Following that time, she can resume regular activities.    She was reminded to call the clinic immediately for any adverse side effects as explained in clinic today.    7. RTC in 2 weeks with Leandra Patrick PA-C for right knee Synvisc one injection.                   Patient questionnaires may have been collected.

## 2019-04-04 NOTE — LETTER
Patient: Mela Blanco   YOB: 1985   Clinic Number: [id number]   Today's Date: April 4, 2019        Certificate to Return to Work     Mela Morris was seen by Leandra Patrick PA-C on 4/4/2019.    Please excuse Mela from work missed on 4/2/2019 - 4/4/2019. She may return to work tomorrow, 4/5/2019.       If you have any questions or concerns, please feel free to contact the office at [phone].    Thank you.    Leandra Patrick PA-C        Signature: _____  _____________________________________________

## 2019-04-16 ENCOUNTER — OFFICE VISIT (OUTPATIENT)
Dept: URGENT CARE | Facility: CLINIC | Age: 34
End: 2019-04-16
Payer: COMMERCIAL

## 2019-04-16 VITALS
RESPIRATION RATE: 16 BRPM | HEIGHT: 63 IN | SYSTOLIC BLOOD PRESSURE: 121 MMHG | DIASTOLIC BLOOD PRESSURE: 80 MMHG | HEART RATE: 78 BPM | OXYGEN SATURATION: 100 % | BODY MASS INDEX: 32.78 KG/M2 | TEMPERATURE: 98 F | WEIGHT: 185 LBS

## 2019-04-16 DIAGNOSIS — L02.421 FURUNCLE OF RIGHT AXILLA: Primary | ICD-10-CM

## 2019-04-16 PROCEDURE — 99214 PR OFFICE/OUTPT VISIT, EST, LEVL IV, 30-39 MIN: ICD-10-PCS | Mod: S$GLB,,, | Performed by: EMERGENCY MEDICINE

## 2019-04-16 PROCEDURE — 99214 OFFICE O/P EST MOD 30 MIN: CPT | Mod: S$GLB,,, | Performed by: EMERGENCY MEDICINE

## 2019-04-16 PROCEDURE — 3008F BODY MASS INDEX DOCD: CPT | Mod: CPTII,S$GLB,, | Performed by: EMERGENCY MEDICINE

## 2019-04-16 PROCEDURE — 3008F PR BODY MASS INDEX (BMI) DOCUMENTED: ICD-10-PCS | Mod: CPTII,S$GLB,, | Performed by: EMERGENCY MEDICINE

## 2019-04-16 RX ORDER — SULFAMETHOXAZOLE AND TRIMETHOPRIM 800; 160 MG/1; MG/1
1 TABLET ORAL 2 TIMES DAILY
Qty: 14 TABLET | Refills: 0 | Status: SHIPPED | OUTPATIENT
Start: 2019-04-16 | End: 2019-04-23

## 2019-04-16 RX ORDER — TRAMADOL HYDROCHLORIDE 50 MG/1
TABLET ORAL
COMMUNITY
End: 2019-11-27

## 2019-04-16 RX ORDER — MUPIROCIN 20 MG/G
OINTMENT TOPICAL
Qty: 22 G | Refills: 1 | Status: SHIPPED | OUTPATIENT
Start: 2019-04-16 | End: 2020-12-05

## 2019-04-16 RX ORDER — PHENTERMINE HYDROCHLORIDE 37.5 MG/1
CAPSULE ORAL
COMMUNITY
End: 2019-11-27

## 2019-04-16 NOTE — PROGRESS NOTES
"Subjective:       Patient ID: Mela Blanco is a 33 y.o. female.    Vitals:    04/16/19 1018   BP: 121/80   Pulse: 78   Resp: 16   Temp: 98 °F (36.7 °C)   TempSrc: Oral   SpO2: 100%   Weight: 83.9 kg (185 lb)   Height: 5' 3" (1.6 m)       Chief Complaint: Abscess (Under right armpit)    Patient reports bump under right arm pit for 2 weeks.Patient reports she has had this before.    Abscess   Chronicity:  RecurrentProgression Since Onset: gradually worsening  Abscess location: right arm pit.  Associated Symptoms: no fever, no chills  Characteristics: painful    Pain Scale:  0/10  Treatments Tried:  Warm compresses and draining/squeezing  Relieved by:  Nothing  Worsened by:  Nothing    Review of Systems   Constitution: Negative for chills and fever.   HENT: Negative for sore throat.    Eyes: Negative for blurred vision.   Cardiovascular: Negative for chest pain.   Respiratory: Negative for shortness of breath.    Skin: Negative for rash.   Musculoskeletal: Negative for back pain and joint pain.   Gastrointestinal: Negative for abdominal pain, diarrhea, nausea and vomiting.   Neurological: Negative for headaches.   Psychiatric/Behavioral: The patient is not nervous/anxious.        Objective:      Physical Exam   Constitutional: She is oriented to person, place, and time. She appears well-developed and well-nourished.   HENT:   Head: Normocephalic and atraumatic. Head is without abrasion, without contusion and without laceration.   Right Ear: External ear normal.   Left Ear: External ear normal.   Nose: Nose normal.   Mouth/Throat: Oropharynx is clear and moist.   Eyes: Pupils are equal, round, and reactive to light. Conjunctivae, EOM and lids are normal.   Neck: Trachea normal, full passive range of motion without pain and phonation normal. Neck supple.   Cardiovascular: Normal rate, regular rhythm and normal heart sounds.   Pulmonary/Chest: Effort normal and breath sounds normal. No stridor. No respiratory " distress.   Musculoskeletal: Normal range of motion.   Neurological: She is alert and oriented to person, place, and time.   Skin: Skin is warm, dry and intact. Capillary refill takes less than 2 seconds. No abrasion, no bruising, no burn, no ecchymosis, no laceration, no lesion and no rash noted. No erythema.        Patient has a 0.5 cm furuncle to the right axilla there is no erythema minimal induration or fluctuance no active drainage   Psychiatric: She has a normal mood and affect. Her speech is normal and behavior is normal. Judgment and thought content normal. Cognition and memory are normal.   Nursing note and vitals reviewed.      Assessment:       1. Furuncle of right axilla        Plan:       Mela was seen today for abscess.    Diagnoses and all orders for this visit:    Furuncle of right axilla    Other orders  -     mupirocin (BACTROBAN) 2 % ointment; Apply to affected area 3 times daily  -     sulfamethoxazole-trimethoprim 800-160mg (BACTRIM DS) 800-160 mg Tab; Take 1 tablet by mouth 2 (two) times daily. for 7 days      Patient Instructions   Go to the Emergency Room if symptoms or condition worsens in any way    Return to Clinic if symptoms do not improve      Abscess (Antibiotic Treatment Only)  An abscess (sometimes called a boil) happens when bacteria get trapped under the skin and start to grow. Pus forms inside the abscess as the body responds to the bacteria. An abscess can happen with an insect bite, ingrown hair, blocked oil gland, pimple, cyst, or puncture wound.  In the early stages, your wound may be red and tender. For this stage, you may get antibiotics. If the abscess does not get better with antibiotics, it will need to be drained with a small cut.  Home care  These tips will help you care for your abscess at home:  · Soak the wound in hot water or apply hot packs (small towel soaked in hot water) to the area for 20 minutes at a time. Do this 3 to 4 times a day.  · Do not cut,  squeeze, or pop the boil yourself.  · Apply antibiotic cream or ointment to the skin 3 to 4 times a day, unless something else was prescribed. Some ointments include an antibiotic plus a pain reliever.  · If your doctor prescribed antibiotics, do not stop taking them until you have finished the medicine or the doctor tells you to stop.  · You may use an over-the-counter pain medicine to control pain, unless another pain medicine was prescribed. If you have chronic liver or kidney disease or ever had a stomach ulcer or gastrointestinal bleeding, talk with your doctor before using these any of these.  Follow-up care  Follow up with your healthcare provider, or as advised. Check your wound each day for the signs of worsening infection listed below.  When to seek medical advice  Get prompt medical attention if any of these occur:  · An increase in redness or swelling  · Red streaks in the skin leading away from the abscess  · An increase in local pain or swelling  · Fever of 100.4ºF (38ºC) or higher, or as directed by your healthcare provider  · Pus or fluid coming from the abscess  · Boil returns after getting better  Date Last Reviewed: 9/1/2016  © 4810-7999 [employer]. [address] [place] [address], [place] [address]. All rights reserved. This information is not intended as a substitute for professional medical care. Always follow your healthcare professional's instructions.

## 2019-04-16 NOTE — PATIENT INSTRUCTIONS
Go to the Emergency Room if symptoms or condition worsens in any way    Return to Clinic if symptoms do not improve      Abscess (Antibiotic Treatment Only)  An abscess (sometimes called a boil) happens when bacteria get trapped under the skin and start to grow. Pus forms inside the abscess as the body responds to the bacteria. An abscess can happen with an insect bite, ingrown hair, blocked oil gland, pimple, cyst, or puncture wound.  In the early stages, your wound may be red and tender. For this stage, you may get antibiotics. If the abscess does not get better with antibiotics, it will need to be drained with a small cut.  Home care  These tips will help you care for your abscess at home:  · Soak the wound in hot water or apply hot packs (small towel soaked in hot water) to the area for 20 minutes at a time. Do this 3 to 4 times a day.  · Do not cut, squeeze, or pop the boil yourself.  · Apply antibiotic cream or ointment to the skin 3 to 4 times a day, unless something else was prescribed. Some ointments include an antibiotic plus a pain reliever.  · If your doctor prescribed antibiotics, do not stop taking them until you have finished the medicine or the doctor tells you to stop.  · You may use an over-the-counter pain medicine to control pain, unless another pain medicine was prescribed. If you have chronic liver or kidney disease or ever had a stomach ulcer or gastrointestinal bleeding, talk with your doctor before using these any of these.  Follow-up care  Follow up with your healthcare provider, or as advised. Check your wound each day for the signs of worsening infection listed below.  When to seek medical advice  Get prompt medical attention if any of these occur:  · An increase in redness or swelling  · Red streaks in the skin leading away from the abscess  · An increase in local pain or swelling  · Fever of 100.4ºF (38ºC) or higher, or as directed by your healthcare provider  · Pus or fluid coming  from the abscess  · Boil returns after getting better  Date Last Reviewed: 9/1/2016  © 6306-4817 [employer]. [address] [place] [address], [place] [address]. All rights reserved. This information is not intended as a substitute for professional medical care. Always follow your healthcare professional's instructions.

## 2019-04-16 NOTE — LETTER
April 16, 2019      [facility] [address] [place] NIETOChika Chavez  [facility] [address]  Phone: [phone]  Fax: [phone]       Patient: Mela Blanco   YOB: 1985  Date of Visit: 04/16/2019    To Whom It May Concern:    Ely Blanco  was at [facility] on 04/16/2019. She may return to work/school on 4/17/19 with no restrictions. If you have any questions or concerns, or if I can be of further assistance, please do not hesitate to contact me.    Sincerely,    Connor Painting III, MD

## 2019-04-22 ENCOUNTER — OFFICE VISIT (OUTPATIENT)
Dept: SPORTS MEDICINE | Facility: CLINIC | Age: 34
End: 2019-04-22
Payer: COMMERCIAL

## 2019-04-22 VITALS
BODY MASS INDEX: 32.78 KG/M2 | SYSTOLIC BLOOD PRESSURE: 117 MMHG | DIASTOLIC BLOOD PRESSURE: 67 MMHG | HEIGHT: 63 IN | WEIGHT: 185 LBS | HEART RATE: 100 BPM

## 2019-04-22 DIAGNOSIS — M17.11 PRIMARY OSTEOARTHRITIS OF RIGHT KNEE: Primary | ICD-10-CM

## 2019-04-22 PROCEDURE — 20610 DRAIN/INJ JOINT/BURSA W/O US: CPT | Mod: RT,S$GLB,, | Performed by: PHYSICIAN ASSISTANT

## 2019-04-22 PROCEDURE — 99499 NO LOS: ICD-10-PCS | Mod: S$GLB,,, | Performed by: PHYSICIAN ASSISTANT

## 2019-04-22 PROCEDURE — 99499 UNLISTED E&M SERVICE: CPT | Mod: S$GLB,,, | Performed by: PHYSICIAN ASSISTANT

## 2019-04-22 PROCEDURE — 99999 PR PBB SHADOW E&M-EST. PATIENT-LVL III: ICD-10-PCS | Mod: PBBFAC,,, | Performed by: PHYSICIAN ASSISTANT

## 2019-04-22 PROCEDURE — 99999 PR PBB SHADOW E&M-EST. PATIENT-LVL III: CPT | Mod: PBBFAC,,, | Performed by: PHYSICIAN ASSISTANT

## 2019-04-22 PROCEDURE — 20610 PR DRAIN/INJECT LARGE JOINT/BURSA: ICD-10-PCS | Mod: RT,S$GLB,, | Performed by: PHYSICIAN ASSISTANT

## 2019-04-22 NOTE — PROGRESS NOTES
Patient is here for follow up of knee arthritis. Pt is requesting synvisc ONE right knee.  [facility] Has failed other conservative modalities including NSAIDS, activity modification, weight loss.    The prior shot was tolerated well.    PHYSICAL EXAMINATION:     General: The patient is alert and oriented x 3. Mood is pleasant.   Observation of ears, eyes and nose reveals no gross abnormalities. No   labored breathing observed.     No signs of infection or adverse reaction to knee.    PROCEDURE NOTE:  Injection Procedure right knee  A time out was performed, including verification of patient ID, procedure, site and side, availability of information and equipment, review of safety issues, and agreement with consent, the procedure site was marked.    After time out was performed, the patient was prepped aseptically with povidone-iodine swabsticks. A diagnostic and therapeutic injection of 6cc SynviscOne was given under sterile technique using a 22g x 1.5 needle from the Superolateral  aspect of the right Knee Joint in the supine position.      Mela Blanco had no adverse reactions to the medication. Pain decreased. She was instructed to apply ice to the joint for 20 minutes and avoid strenuous activities for 24-36 hours following the injection. She was warned of possible blood sugar and/or blood pressure changes during that time. Following that time, she can resume regular activities.    She was reminded to call the clinic immediately for any adverse side effects as explained in clinic today.      RTC in 6 weeks with Leandra Patrick PA-C for follow up right knee.  All questions were answered, pt will contact us for questions or concerns in the interim.

## 2019-09-05 ENCOUNTER — PATIENT OUTREACH (OUTPATIENT)
Dept: ADMINISTRATIVE | Facility: HOSPITAL | Age: 34
End: 2019-09-05

## 2019-09-09 ENCOUNTER — TELEPHONE (OUTPATIENT)
Dept: SPORTS MEDICINE | Facility: CLINIC | Age: 34
End: 2019-09-09

## 2019-09-09 DIAGNOSIS — M17.11 PRIMARY OSTEOARTHRITIS OF RIGHT KNEE: ICD-10-CM

## 2019-09-09 DIAGNOSIS — M17.12 PRIMARY OSTEOARTHRITIS OF LEFT KNEE: Primary | ICD-10-CM

## 2019-09-09 NOTE — TELEPHONE ENCOUNTER
----- Message from Margaret Smith sent at 9/9/2019 11:06 AM CDT -----  Contact: self @ [phone]  Pt is calling to see when she can be scheduled for another synvisc injection.  Pls call.

## 2019-10-17 ENCOUNTER — HOSPITAL ENCOUNTER (EMERGENCY)
Facility: HOSPITAL | Age: 34
Discharge: HOME | End: 2019-10-17
Attending: EMERGENCY MEDICINE
Payer: COMMERCIAL

## 2019-10-17 VITALS
HEIGHT: 63 IN | WEIGHT: 185 LBS | SYSTOLIC BLOOD PRESSURE: 133 MMHG | HEART RATE: 85 BPM | TEMPERATURE: 99 F | RESPIRATION RATE: 18 BRPM | BODY MASS INDEX: 32.78 KG/M2 | DIASTOLIC BLOOD PRESSURE: 87 MMHG | OXYGEN SATURATION: 100 %

## 2019-10-17 DIAGNOSIS — R00.2 PALPITATIONS: ICD-10-CM

## 2019-10-17 DIAGNOSIS — R53.83 FATIGUE, UNSPECIFIED TYPE: Primary | ICD-10-CM

## 2019-10-17 LAB
ALBUMIN SERPL BCP-MCNC: 3.4 G/DL (ref 3.5–5.2)
ALP SERPL-CCNC: 42 U/L (ref 55–135)
ALT SERPL W/O P-5'-P-CCNC: 15 U/L (ref 10–44)
ANION GAP SERPL CALC-SCNC: 7 MMOL/L (ref 8–16)
AST SERPL-CCNC: 13 U/L (ref 10–40)
B-HCG UR QL: NEGATIVE
BASOPHILS # BLD AUTO: 0.02 K/UL (ref 0–0.2)
BASOPHILS NFR BLD: 0.5 % (ref 0–1.9)
BILIRUB SERPL-MCNC: 0.3 MG/DL (ref 0.1–1)
BILIRUB UR QL STRIP: NEGATIVE
BUN SERPL-MCNC: 15 MG/DL (ref 6–20)
CALCIUM SERPL-MCNC: 8.3 MG/DL (ref 8.7–10.5)
CHLORIDE SERPL-SCNC: 105 MMOL/L (ref 95–110)
CLARITY UR REFRACT.AUTO: CLEAR
CO2 SERPL-SCNC: 23 MMOL/L (ref 23–29)
COLOR UR AUTO: YELLOW
CREAT SERPL-MCNC: 0.9 MG/DL (ref 0.5–1.4)
CTP QC/QA: YES
DIFFERENTIAL METHOD: ABNORMAL
EOSINOPHIL # BLD AUTO: 0.1 K/UL (ref 0–0.5)
EOSINOPHIL NFR BLD: 2.1 % (ref 0–8)
ERYTHROCYTE [DISTWIDTH] IN BLOOD BY AUTOMATED COUNT: 13.2 % (ref 11.5–14.5)
EST. GFR  (AFRICAN AMERICAN): >60 ML/MIN/1.73 M^2
EST. GFR  (NON AFRICAN AMERICAN): >60 ML/MIN/1.73 M^2
GLUCOSE SERPL-MCNC: 91 MG/DL (ref 70–110)
GLUCOSE UR QL STRIP: NEGATIVE
HCT VFR BLD AUTO: 41.5 % (ref 37–48.5)
HGB BLD-MCNC: 13.6 G/DL (ref 12–16)
HGB UR QL STRIP: ABNORMAL
IMM GRANULOCYTES # BLD AUTO: 0.01 K/UL (ref 0–0.04)
IMM GRANULOCYTES NFR BLD AUTO: 0.3 % (ref 0–0.5)
KETONES UR QL STRIP: NEGATIVE
LEUKOCYTE ESTERASE UR QL STRIP: NEGATIVE
LIPASE SERPL-CCNC: 35 U/L (ref 4–60)
LYMPHOCYTES # BLD AUTO: 1.5 K/UL (ref 1–4.8)
LYMPHOCYTES NFR BLD: 39.7 % (ref 18–48)
MAGNESIUM SERPL-MCNC: 1.9 MG/DL (ref 1.6–2.6)
MCH RBC QN AUTO: 30.2 PG (ref 27–31)
MCHC RBC AUTO-ENTMCNC: 32.8 G/DL (ref 32–36)
MCV RBC AUTO: 92 FL (ref 82–98)
MICROSCOPIC COMMENT: NORMAL
MONOCYTES # BLD AUTO: 0.5 K/UL (ref 0.3–1)
MONOCYTES NFR BLD: 12.8 % (ref 4–15)
NEUTROPHILS # BLD AUTO: 1.7 K/UL (ref 1.8–7.7)
NEUTROPHILS NFR BLD: 44.6 % (ref 38–73)
NITRITE UR QL STRIP: NEGATIVE
NRBC BLD-RTO: 0 /100 WBC
PH UR STRIP: 7 [PH] (ref 5–8)
PLATELET # BLD AUTO: 313 K/UL (ref 150–350)
PMV BLD AUTO: 9.7 FL (ref 9.2–12.9)
POTASSIUM SERPL-SCNC: 3.9 MMOL/L (ref 3.5–5.1)
PROT SERPL-MCNC: 6.4 G/DL (ref 6–8.4)
PROT UR QL STRIP: NEGATIVE
RBC # BLD AUTO: 4.51 M/UL (ref 4–5.4)
RBC #/AREA URNS AUTO: 1 /HPF (ref 0–4)
SODIUM SERPL-SCNC: 135 MMOL/L (ref 136–145)
SP GR UR STRIP: 1.01 (ref 1–1.03)
SQUAMOUS #/AREA URNS AUTO: 1 /HPF
URN SPEC COLLECT METH UR: ABNORMAL
WBC # BLD AUTO: 3.75 K/UL (ref 3.9–12.7)

## 2019-10-17 PROCEDURE — 80053 COMPREHEN METABOLIC PANEL: CPT

## 2019-10-17 PROCEDURE — 83735 ASSAY OF MAGNESIUM: CPT

## 2019-10-17 PROCEDURE — 93010 EKG 12-LEAD: ICD-10-PCS | Mod: ,,, | Performed by: INTERNAL MEDICINE

## 2019-10-17 PROCEDURE — 63600175 PHARM REV CODE 636 W HCPCS: Performed by: EMERGENCY MEDICINE

## 2019-10-17 PROCEDURE — 99284 EMERGENCY DEPT VISIT MOD MDM: CPT | Mod: 25

## 2019-10-17 PROCEDURE — 85025 COMPLETE CBC W/AUTO DIFF WBC: CPT

## 2019-10-17 PROCEDURE — 83690 ASSAY OF LIPASE: CPT

## 2019-10-17 PROCEDURE — 93010 ELECTROCARDIOGRAM REPORT: CPT | Mod: ,,, | Performed by: INTERNAL MEDICINE

## 2019-10-17 PROCEDURE — 81001 URINALYSIS AUTO W/SCOPE: CPT

## 2019-10-17 PROCEDURE — 99285 PR EMERGENCY DEPT VISIT,LEVEL V: ICD-10-PCS | Mod: ,,, | Performed by: EMERGENCY MEDICINE

## 2019-10-17 PROCEDURE — 81025 URINE PREGNANCY TEST: CPT | Performed by: EMERGENCY MEDICINE

## 2019-10-17 PROCEDURE — 99285 EMERGENCY DEPT VISIT HI MDM: CPT | Mod: ,,, | Performed by: EMERGENCY MEDICINE

## 2019-10-17 PROCEDURE — 93005 ELECTROCARDIOGRAM TRACING: CPT

## 2019-10-17 RX ORDER — METOCLOPRAMIDE HYDROCHLORIDE 5 MG/ML
10 INJECTION INTRAMUSCULAR; INTRAVENOUS
Status: COMPLETED | OUTPATIENT
Start: 2019-10-17 | End: 2019-10-17

## 2019-10-17 RX ADMIN — SODIUM CHLORIDE 1000 ML: 0.9 INJECTION, SOLUTION INTRAVENOUS at 05:10

## 2019-10-17 RX ADMIN — METOCLOPRAMIDE 10 MG: 5 INJECTION, SOLUTION INTRAMUSCULAR; INTRAVENOUS at 05:10

## 2019-10-17 NOTE — ED NOTES
Patient identifiers verified and correct for Ms Blanco  C/C: Mult concerns, fatigue, abd pain ,nausea, headache, possible confusion,   APPEARANCE: awake and alert in NAD.  SKIN: warm, dry and intact. No breakdown or bruising.  MUSCULOSKELETAL: Patient moving all extremities spontaneously, no obvious swelling or deformities noted. Ambulates independently.  RESPIRATORY: Denies shortness of breath.Respirations unlabored. Braden cough, Denies fevers  CARDIAC: Positive midsternal heartburn CP, 2+ distal pulses; no peripheral edema  ABDOMEN: S/ND/NT, Denies nausea  : voids spontaneously, denies difficulty  Neurologic: AAO x 4; follows commands equal strength in all extremities; denies numbness/tingling. Denies dizziness Positive lightheaded, gen weakness and fatigue, headache to left temple.

## 2019-10-17 NOTE — ED TRIAGE NOTES
Patient with mult concerns, states fatigue and feeling tired, states she has to think twice and may be confused since last Saturday. States diarrhea onset Tuesday, no Bm since Tuesday. Currently with lower abd pain , positive nausea, no emesis today, no BM since Tuesday, states gen all over  muscle cramps and not being able to sleep, also states left temple headache, taking Kaopectate and Pepto Bismol at 1200 today. Denies fevers

## 2019-10-17 NOTE — DISCHARGE INSTRUCTIONS
Stay well hydrated.    Our goal in the emergency department is to always give you outstanding care and exceptional service. You may receive a survey by mail or e-mail in the next week regarding your experience in our ED. We would greatly appreciate your completing and returning the survey. Your feedback provides us with a way to recognize our staff who give very good care and it helps us learn how to improve when your experience was below our aspiration of excellence.

## 2019-10-17 NOTE — ED PROVIDER NOTES
Encounter Date: 10/17/2019    SCRIBE #1 NOTE: I, Jennifer Gan, am scribing for, and in the presence of,  Dr. Pitts. I have scribed the following portions of the note - Other sections scribed: HPI, ROS, PE.       History     Chief Complaint   Patient presents with    Diarrhea     x 3 days with nausea     Time patient was seen by the provider: 4:04 PM      The patient is a 33 y.o. female with past medical history including: laparoscopic gastric banding, who presents to the ED with a complaint of fatigue for 3 days. The patient reports she had multiple episodes of diarrhea on Tuesday. Since then she has not been able to have a bowel movement. Notes of associated nausea and abdominal. The patient also complains of having an episode of palpitations last night while lying in bed. Denies vomiting, fever, cough, SOB, chest pain, dysuria. A ten point review of systems was completed and is negative except as documented above.  Patient denies any other acute medical complaint.The patients available PMH, PSH, Social History, medications, allergies, and triage vital signs were reviewed just prior to their medical evaluation.      The history is provided by the patient and medical records.     Review of patient's allergies indicates:  No Known Allergies  Past Medical History:   Diagnosis Date    Bariatric surgery status 5/25/2015    Bilateral carpal tunnel syndrome 1/12/2018    Miscarriage      Past Surgical History:   Procedure Laterality Date    breast reduction      BREAST SURGERY      CARPAL TUNNEL RELEASE Right 01/12/2018    CARPAL TUNNEL RELEASE Left 10/19/2018    Procedure: RELEASE, CARPAL TUNNEL;  Surgeon: Michael Agrawal Jr., MD;  Location: [facility];  Service: Plastics;  Laterality: Left;    COSMETIC SURGERY      hematoma surgery      LAPAROSCOPIC GASTRIC BANDING       Family History   Problem Relation Age of Onset    Diabetes Mother     Hypertension Mother     Hyperlipidemia Mother     Glaucoma  Mother     Arthritis Father     Hypertension Father     Diabetes Maternal Grandmother     Amblyopia Neg Hx     Blindness Neg Hx     Cancer Neg Hx     Cataracts Neg Hx     Macular degeneration Neg Hx     Retinal detachment Neg Hx     Strabismus Neg Hx     Stroke Neg Hx     Thyroid disease Neg Hx      Social History     Tobacco Use    Smoking status: Never Smoker    Smokeless tobacco: Never Used   Substance Use Topics    Alcohol use: Yes     Comment: occasional.    Drug use: No     Review of Systems   Constitutional: Positive for fatigue. Negative for fever.   HENT: Negative for sore throat.    Eyes: Negative for visual disturbance.   Respiratory: Negative for cough and shortness of breath.    Cardiovascular: Positive for palpitations. Negative for chest pain.   Gastrointestinal: Positive for abdominal pain, diarrhea and nausea. Negative for vomiting.   Genitourinary: Negative for dysuria.   Musculoskeletal: Negative for neck pain.   Skin: Negative for rash and wound.   Allergic/Immunologic: Negative for immunocompromised state.   Neurological: Positive for headaches. Negative for syncope.   Psychiatric/Behavioral: Negative for confusion.   All other systems reviewed and are negative.      Physical Exam     Initial Vitals [10/17/19 1529]   BP Pulse Resp Temp SpO2   133/87 85 18 98.7 °F (37.1 °C) 100 %      MAP       --         Physical Exam    Nursing note and vitals reviewed.  Constitutional: She appears well-developed and well-nourished. She is not diaphoretic. No distress.   HENT:   Head: Normocephalic and atraumatic.   Nose: Nose normal.   Eyes: Conjunctivae are normal. Right eye exhibits no discharge. Left eye exhibits no discharge.   Neck: Normal range of motion. Neck supple.   Cardiovascular: Normal rate, regular rhythm and normal heart sounds. Exam reveals no gallop and no friction rub.    No murmur heard.  Pulmonary/Chest: Breath sounds normal. No respiratory distress. She has no wheezes. She  has no rhonchi. She has no rales.   Abdominal: Soft. She exhibits no distension. There is tenderness in the right lower quadrant. There is no rebound and no guarding.   Minimal RLQ abdominal pain   Musculoskeletal: Normal range of motion. She exhibits no edema or tenderness.   Neurological: She is alert and oriented to person, place, and time. She has normal strength. GCS score is 15. GCS eye subscore is 4. GCS verbal subscore is 5. GCS motor subscore is 6.   Skin: Skin is warm and dry. No rash noted. No erythema.   Psychiatric: She has a normal mood and affect. Her behavior is normal. Judgment and thought content normal.         ED Course   Procedures  Labs Reviewed   CBC W/ AUTO DIFFERENTIAL - Abnormal; Notable for the following components:       Result Value    WBC 3.75 (*)     Gran # (ANC) 1.7 (*)     All other components within normal limits   URINALYSIS, REFLEX TO URINE CULTURE - Abnormal; Notable for the following components:    Occult Blood UA 1+ (*)     All other components within normal limits    Narrative:     Preferred Collection Type->Urine, Clean Catch   COMPREHENSIVE METABOLIC PANEL - Abnormal; Notable for the following components:    Sodium 135 (*)     Calcium 8.3 (*)     Albumin 3.4 (*)     Alkaline Phosphatase 42 (*)     Anion Gap 7 (*)     All other components within normal limits   URINALYSIS MICROSCOPIC    Narrative:     Preferred Collection Type->Urine, Clean Catch   MAGNESIUM   LIPASE   POCT URINE PREGNANCY     EKG Readings: (Independently Interpreted)   Initial Reading: No STEMI. Rhythm: Normal Sinus Rhythm. Heart Rate: 71. Ectopy: No Ectopy. Conduction: Normal. ST Segments: Normal ST Segments. T Waves: Normal. Clinical Impression: Normal Sinus Rhythm       Imaging Results    None          Medical Decision Making:   History:   Old Medical Records: I decided to obtain old medical records.  Independently Interpreted Test(s):   I have ordered and independently interpreted EKG Reading(s) - see  prior notes  Clinical Tests:   Lab Tests: Ordered and Reviewed  Medical Tests: Ordered and Reviewed  ED Management:  33-year-old female status post lap band presents with generalized fatigue and minimal abdominal pain.  Vitals normal. Physical exam benign.  Doubt acute abdominal surgical emergency to include appendicitis and cholecystitis.  Patient vastly improved with IV hydration.  Suspect her fatigue with secondary to dehydration.  I recommended she follow up with her GI surgeon to discuss her lap band future.  She will call tomorrow to arrange.  Counseled on hydration.  Patient will return to ED for worsening symptoms, inability to eat/drink, fever greater than 100.4, or any other concerns.  Did bedside teaching with return precautions.  All questions answered.  The patient acknowledges understanding.  Gave verbal discharge instructions.            Scribe Attestation:   Scribe #1: I performed the above scribed service and the documentation accurately describes the services I performed. I attest to the accuracy of the note.               Clinical Impression:       ICD-10-CM ICD-9-CM   1. Fatigue, unspecified type R53.83 780.79   2. Palpitations R00.2 785.1         Disposition:   Disposition: Discharged  Condition: Stable                        Piotr Pitts MD  10/19/19 0227

## 2019-10-23 ENCOUNTER — TELEPHONE (OUTPATIENT)
Dept: SPORTS MEDICINE | Facility: CLINIC | Age: 34
End: 2019-10-23

## 2019-10-23 ENCOUNTER — OFFICE VISIT (OUTPATIENT)
Dept: SPORTS MEDICINE | Facility: CLINIC | Age: 34
End: 2019-10-23
Payer: COMMERCIAL

## 2019-10-23 VITALS
HEIGHT: 63 IN | SYSTOLIC BLOOD PRESSURE: 135 MMHG | DIASTOLIC BLOOD PRESSURE: 88 MMHG | HEART RATE: 97 BPM | WEIGHT: 185 LBS | BODY MASS INDEX: 32.78 KG/M2

## 2019-10-23 DIAGNOSIS — M17.11 PRIMARY OSTEOARTHRITIS OF RIGHT KNEE: Primary | ICD-10-CM

## 2019-10-23 PROCEDURE — 99499 UNLISTED E&M SERVICE: CPT | Mod: S$GLB,,, | Performed by: PHYSICIAN ASSISTANT

## 2019-10-23 PROCEDURE — 20610 PR DRAIN/INJECT LARGE JOINT/BURSA: ICD-10-PCS | Mod: RT,S$GLB,, | Performed by: PHYSICIAN ASSISTANT

## 2019-10-23 PROCEDURE — 99999 PR PBB SHADOW E&M-EST. PATIENT-LVL III: ICD-10-PCS | Mod: PBBFAC,,, | Performed by: PHYSICIAN ASSISTANT

## 2019-10-23 PROCEDURE — 99999 PR PBB SHADOW E&M-EST. PATIENT-LVL III: CPT | Mod: PBBFAC,,, | Performed by: PHYSICIAN ASSISTANT

## 2019-10-23 PROCEDURE — 20610 DRAIN/INJ JOINT/BURSA W/O US: CPT | Mod: RT,S$GLB,, | Performed by: PHYSICIAN ASSISTANT

## 2019-10-23 PROCEDURE — 99499 NO LOS: ICD-10-PCS | Mod: S$GLB,,, | Performed by: PHYSICIAN ASSISTANT

## 2019-10-23 NOTE — PROGRESS NOTES
Patient is here for follow up of knee arthritis. Pt is requesting synvisc ONE right knee.  [facility] Has failed other conservative modalities including NSAIDS, activity modification, weight loss.    The prior shot was tolerated well.    PHYSICAL EXAMINATION:     General: The patient is alert and oriented x 3. Mood is pleasant.   Observation of ears, eyes and nose reveals no gross abnormalities. No   labored breathing observed.     No signs of infection or adverse reaction to knee.    PROCEDURE NOTE:  Injection Procedure right knee  A time out was performed, including verification of patient ID, procedure, site and side, availability of information and equipment, review of safety issues, and agreement with consent, the procedure site was marked.    After time out was performed, the patient was prepped aseptically with povidone-iodine swabsticks. A diagnostic and therapeutic injection of 6cc SynviscOne was given under sterile technique using a 22g x 1.5 needle from the Superolateral aspect of the right Knee Joint in the supine position.      Mela Blanco had no adverse reactions to the medication. Pain decreased. She was instructed to apply ice to the joint for 20 minutes and avoid strenuous activities for 24-36 hours following the injection. She was warned of possible blood sugar and/or blood pressure changes during that time. Following that time, she can resume regular activities.    She was reminded to call the clinic immediately for any adverse side effects as explained in clinic today.      RTC in 6 months with Leandra Patrick PA-C for follow up right knee.  All questions were answered, pt will contact us for questions or concerns in the interim.

## 2019-10-23 NOTE — TELEPHONE ENCOUNTER
----- Message from Izzy Yuen sent at 10/23/2019 12:18 PM CDT -----  Contact: pt   Pt would like to see if she can possibly come in earlier for joint injection today . Please give pt a call back at [phone].

## 2019-10-28 ENCOUNTER — TELEPHONE (OUTPATIENT)
Dept: SPORTS MEDICINE | Facility: CLINIC | Age: 34
End: 2019-10-28

## 2019-10-28 NOTE — TELEPHONE ENCOUNTER
----- Message from Mignon Dove sent at 10/28/2019  4:45 PM CDT -----  Contact: self  Pt states that the gel injections is not helping ask if she can get a cortisone injections now Pt ask for a call      Contact info

## 2019-11-06 ENCOUNTER — OFFICE VISIT (OUTPATIENT)
Dept: SPORTS MEDICINE | Facility: CLINIC | Age: 34
End: 2019-11-06
Payer: COMMERCIAL

## 2019-11-06 VITALS
WEIGHT: 185 LBS | HEIGHT: 63 IN | DIASTOLIC BLOOD PRESSURE: 90 MMHG | BODY MASS INDEX: 32.78 KG/M2 | SYSTOLIC BLOOD PRESSURE: 119 MMHG | HEART RATE: 104 BPM

## 2019-11-06 DIAGNOSIS — M17.11 PRIMARY OSTEOARTHRITIS OF RIGHT KNEE: Primary | ICD-10-CM

## 2019-11-06 DIAGNOSIS — M17.12 PRIMARY OSTEOARTHRITIS OF LEFT KNEE: ICD-10-CM

## 2019-11-06 PROCEDURE — 99999 PR PBB SHADOW E&M-EST. PATIENT-LVL III: CPT | Mod: PBBFAC,,, | Performed by: PHYSICIAN ASSISTANT

## 2019-11-06 PROCEDURE — 20610 PR DRAIN/INJECT LARGE JOINT/BURSA: ICD-10-PCS | Mod: LT,S$GLB,, | Performed by: PHYSICIAN ASSISTANT

## 2019-11-06 PROCEDURE — 20610 DRAIN/INJ JOINT/BURSA W/O US: CPT | Mod: LT,S$GLB,, | Performed by: PHYSICIAN ASSISTANT

## 2019-11-06 PROCEDURE — 99213 OFFICE O/P EST LOW 20 MIN: CPT | Mod: 25,S$GLB,, | Performed by: PHYSICIAN ASSISTANT

## 2019-11-06 PROCEDURE — 99213 PR OFFICE/OUTPT VISIT, EST, LEVL III, 20-29 MIN: ICD-10-PCS | Mod: 25,S$GLB,, | Performed by: PHYSICIAN ASSISTANT

## 2019-11-06 PROCEDURE — 3008F PR BODY MASS INDEX (BMI) DOCUMENTED: ICD-10-PCS | Mod: CPTII,S$GLB,, | Performed by: PHYSICIAN ASSISTANT

## 2019-11-06 PROCEDURE — 3008F BODY MASS INDEX DOCD: CPT | Mod: CPTII,S$GLB,, | Performed by: PHYSICIAN ASSISTANT

## 2019-11-06 PROCEDURE — 99999 PR PBB SHADOW E&M-EST. PATIENT-LVL III: ICD-10-PCS | Mod: PBBFAC,,, | Performed by: PHYSICIAN ASSISTANT

## 2019-11-06 RX ORDER — BUPIVACAINE HYDROCHLORIDE 2.5 MG/ML
3 INJECTION, SOLUTION INFILTRATION; PERINEURAL
Status: COMPLETED | OUTPATIENT
Start: 2019-11-06 | End: 2019-11-06

## 2019-11-06 RX ORDER — TRIAMCINOLONE ACETONIDE 40 MG/ML
40 INJECTION, SUSPENSION INTRA-ARTICULAR; INTRAMUSCULAR
Status: COMPLETED | OUTPATIENT
Start: 2019-11-06 | End: 2019-11-06

## 2019-11-06 RX ADMIN — BUPIVACAINE HYDROCHLORIDE 7.5 MG: 2.5 INJECTION, SOLUTION INFILTRATION; PERINEURAL at 05:11

## 2019-11-06 RX ADMIN — TRIAMCINOLONE ACETONIDE 40 MG: 40 INJECTION, SUSPENSION INTRA-ARTICULAR; INTRAMUSCULAR at 05:11

## 2019-11-06 NOTE — LETTER
November 6, 2019      [facility]  [address]  [facility] [address]  Phone: [phone]       Patient: Mela Blanco   YOB: 1985  Date of Visit: 11/06/2019    To Whom It May Concern:    Ely Blanco  was at [facility] on 11/06/2019.  Please excuse her from work on Wednesdays while she is at her physical therapy sessions. If you have any questions or concerns, or if I can be of further assistance, please do not hesitate to contact me.    Sincerely,      Leandra Patrick PA-C

## 2019-11-06 NOTE — PROGRESS NOTES
Subjective:          Chief Complaint: Mela Blanco is a 33 y.o. female who had concerns including Pain of the Left Knee.      Pain   Associated symptoms include joint swelling. Pertinent negatives include no abdominal pain, chest pain, chills, congestion, coughing, fever, headaches, myalgias, nausea, numbness, rash, sore throat or vomiting.      Patient who is a  presents to clinic with bilateral knee pain. She has had right knee pain x 3-4 years. Pain is worse with walking, standing, kneeling, stairs, and increased activity. She began noticing an increase in her left knee pain as well. Pain is better with rest. She has been experiencing swelling in her right knee towards the end of the day. Pain is located in the posterior knee and above patella. She reports increased crepitus in her knees. Pain at rest is 0/10. Pain with any activity is 4-5/10. She has been taking Ibuprofen 800mg one to two times a day. -mechanical symptoms,  - instability. She received a right knee Synvisc one injection in her knee 2 weeks ago but has yet to receive benefits. She had it 6 months ago as well with significant relief of pain. She is requesting a left knee CSI today.       Review of Systems   Constitution: Negative. Negative for chills, fever, weight gain and weight loss.   HENT: Negative for congestion and sore throat.    Eyes: Negative for blurred vision and double vision.   Cardiovascular: Negative for chest pain, leg swelling and palpitations.   Respiratory: Negative for cough and shortness of breath.    Hematologic/Lymphatic: Does not bruise/bleed easily.   Skin: Negative for itching, poor wound healing and rash.   Musculoskeletal: Positive for joint pain, joint swelling and stiffness. Negative for back pain, muscle weakness and myalgias.   Gastrointestinal: Negative for abdominal pain, constipation, diarrhea, nausea and vomiting.   Genitourinary: Negative.  Negative for frequency and hematuria.    Neurological: Negative for dizziness, headaches, numbness, paresthesias and sensory change.   Psychiatric/Behavioral: Negative for altered mental status and depression. The patient is not nervous/anxious.    Allergic/Immunologic: Negative for hives.       Pain Related Questions  Over the past 3 days, what was your average pain during activity? (I.e. running, jogging, walking, climbing stairs, getting dressed, ect.): 5  Over the past 3 days, what was your highest pain level?: 5  Over the past 3 days, what was your lowest pain level? : 3    Other  How many nights a week are you awakened by your affected body part?: 0  Was the patient's HEIGHT measured or patient reported?: Patient Reported  Was the patient's WEIGHT measured or patient reported?: Measured      Objective:        General: Mela is well-developed, well-nourished, appears stated age, in no acute distress, alert and oriented to time, place and person.     General    Vitals reviewed.  Constitutional: She is oriented to person, place, and time. She appears well-developed and well-nourished. No distress.   HENT:   Head: Normocephalic and atraumatic.   Eyes: EOM are normal.   Neck: Normal range of motion.   Cardiovascular: Normal rate and regular rhythm.    Pulmonary/Chest: Effort normal. No respiratory distress.   Neurological: She is alert and oriented to person, place, and time. She has normal reflexes. No cranial nerve deficit. Coordination normal.   Psychiatric: She has a normal mood and affect. Her behavior is normal. Judgment and thought content normal.     General Musculoskeletal Exam   Gait: normal       Right Knee Exam     Inspection   Erythema: absent  Scars: absent  Swelling: absent  Effusion: present  Deformity: absent  Bruising: absent    Tenderness   The patient is tender to palpation of the patella and medial hamstring.    Range of Motion   Extension:  0 normal   Flexion:  130 abnormal     Tests   Meniscus   Tracy:  Medial - negative  Lateral - positive  Ligament Examination Lachman: normal (-1 to 2mm) PCL-Posterior Drawer: normal (0 to 2mm)     MCL - Valgus: normal (0 to 2mm)  LCL - Varus: normalPivot Shift: normal (Equal)Reverse Pivot Shift: normal (Equal)  Posterolateral Corner: unstable (>15 degrees difference)  Patella   Passive Patellar Tilt: neutral  Patellar Glide (quadrants): Lateral - 1   Medial - 2  Patellar Grind: positive    Other   Sensation: normal    Left Knee Exam     Inspection   Erythema: absent  Scars: absent  Swelling: absent  Effusion: absent  Deformity: absent  Bruising: absent    Tenderness   The patient is experiencing no tenderness.     Crepitus   The patient has crepitus of the patella.    Range of Motion   Extension:  0 normal   Flexion:  140 normal     Tests   Meniscus   Tracy:  Medial - negative Lateral - negative  Stability Lachman: normal (-1 to 2mm) PCL-Posterior Drawer: normal (0 to 2mm)  MCL - Valgus: normal (0 to 2mm)  LCL - Varus: normal (0 to 2mm)Pivot Shift: normal (Equal)Reverse Pivot Shift: normal (Equal)  Posterolateral Corner: unstable (>15 degrees difference)  Patella   Passive Patellar Tilt: neutral  Patellar Glide (Quadrants): Lateral - 1 Medial - 2  Patellar Grind: negative    Other   Sensation: normal    Muscle Strength   Right Lower Extremity   Hip Abduction: 5/5   Quadriceps:  5/5   Hamstrin/5   Left Lower Extremity   Hip Abduction: 5/5   Quadriceps:  5/5   Hamstrin/5     Reflexes     Left Side  Quadriceps:  2+  Achilles:  2+    Right Side   Quadriceps:  2+  Achilles:  2+    Vascular Exam     Right Pulses  Dorsalis Pedis:      2+  Posterior Tibial:      2+        Left Pulses  Dorsalis Pedis:      2+  Posterior Tibial:      2+          RADIOGRAPHS:  Bilateral knees:  FINDINGS:  No fracture or dislocation.  No bone destruction identified.  Mild patellofemoral misalignment.    MRI results right knee:  Findings:     Menisci:  No signal abnormality to suggest meniscal tear.     Cruciate  ligaments: Abnormally thickened ACL with increased T2 signal without evidence of tendon disruption.  Small intracondylar cyst near the femoral attachment along the posterior femoral condyle and associated moderate degree of bone marrow edema within the tibial plateau. PCL is intact.     Collateral ligaments: The medial and lateral collateral ligaments are intact.    Extensor mechanism: No tear.  Patellar and quadriceps tendons are intact.    Cartilage: Mild fissuring of the cartilage within the weightbearing aspect of the medial tibial femoral compartment and mild heterogeneous T2 signal within the lateral patellofemoral compartment. No full thickness cartilaginous defect.    Marrow: No additional bone marrow edema. No fractures.    Joint fluid and synovium: Small suprapatellar joint effusion.  No synovial abnormality.    Other: Mild lateral subluxation of the patella.  No evidence of injury to the medial patellar retinaculum or MPFL, noting mild soft tissue edema signal along the lateral aspect of the inferior patellar pole.  No definite trochlear dysplasia.        Assessment:       Encounter Diagnoses   Name Primary?    Primary osteoarthritis of right knee Yes    Primary osteoarthritis of left knee           Plan:       1.. Continue Ibuprofen as needed for pain    2.  Continue Viscoskin    3. Injection Procedure left knee  A time out was performed, including verification of patient ID, procedure, site and side, availability of information and equipment, review of safety issues, and agreement with consent, the procedure site was marked.    After time out was performed, the patient was prepped aseptically with povidone-iodine swabsticks. A diagnostic and therapeutic injection of 1:3cc Kenalog/Marcaine was given under sterile technique using a 22g x 1.5 needle from the Superolateral  aspect of the left Knee Joint in the supine position.      Mela Blanco had no adverse reactions to the medication. Pain  decreased. She was instructed to apply ice to the joint for 20 minutes and avoid strenuous activities for 24-36 hours following the injection. She was warned of possible blood sugar and/or blood pressure changes during that time. Following that time, she can resume regular activities.    She was reminded to call the clinic immediately for any adverse side effects as explained in clinic today.    4. Patient will contact me if she does not receive benefits from right knee Synvisc one injection                  Patient questionnaires may have been collected.

## 2019-11-27 ENCOUNTER — OFFICE VISIT (OUTPATIENT)
Dept: INTERNAL MEDICINE | Facility: CLINIC | Age: 34
End: 2019-11-27
Payer: COMMERCIAL

## 2019-11-27 ENCOUNTER — LAB VISIT (OUTPATIENT)
Dept: LAB | Facility: HOSPITAL | Age: 34
End: 2019-11-27
Attending: INTERNAL MEDICINE
Payer: COMMERCIAL

## 2019-11-27 ENCOUNTER — CLINICAL SUPPORT (OUTPATIENT)
Dept: INTERNAL MEDICINE | Facility: CLINIC | Age: 34
End: 2019-11-27
Payer: COMMERCIAL

## 2019-11-27 VITALS
BODY MASS INDEX: 32.69 KG/M2 | SYSTOLIC BLOOD PRESSURE: 120 MMHG | HEIGHT: 65 IN | WEIGHT: 196.19 LBS | DIASTOLIC BLOOD PRESSURE: 80 MMHG | OXYGEN SATURATION: 99 % | HEART RATE: 80 BPM

## 2019-11-27 DIAGNOSIS — T39.395A NSAID INDUCED GASTRITIS: ICD-10-CM

## 2019-11-27 DIAGNOSIS — Z98.84 BARIATRIC SURGERY STATUS: ICD-10-CM

## 2019-11-27 DIAGNOSIS — Z76.89 ESTABLISHING CARE WITH NEW DOCTOR, ENCOUNTER FOR: Primary | ICD-10-CM

## 2019-11-27 DIAGNOSIS — Z97.5 IUD (INTRAUTERINE DEVICE) IN PLACE: ICD-10-CM

## 2019-11-27 DIAGNOSIS — E55.9 VITAMIN D INSUFFICIENCY: ICD-10-CM

## 2019-11-27 DIAGNOSIS — Z79.899 CURRENT USE OF PROTON PUMP INHIBITOR: ICD-10-CM

## 2019-11-27 DIAGNOSIS — J30.89 PERENNIAL ALLERGIC RHINITIS: ICD-10-CM

## 2019-11-27 DIAGNOSIS — K29.60 NSAID INDUCED GASTRITIS: ICD-10-CM

## 2019-11-27 LAB
25(OH)D3+25(OH)D2 SERPL-MCNC: 21 NG/ML (ref 30–96)
ESTIMATED AVG GLUCOSE: 100 MG/DL (ref 68–131)
FERRITIN SERPL-MCNC: 40 NG/ML (ref 20–300)
HBA1C MFR BLD HPLC: 5.1 % (ref 4–5.6)
IRON SERPL-MCNC: 88 UG/DL (ref 30–160)
SATURATED IRON: 27 % (ref 20–50)
TOTAL IRON BINDING CAPACITY: 324 UG/DL (ref 250–450)
TRANSFERRIN SERPL-MCNC: 219 MG/DL (ref 200–375)
VIT B12 SERPL-MCNC: 359 PG/ML (ref 210–950)

## 2019-11-27 PROCEDURE — 3008F BODY MASS INDEX DOCD: CPT | Mod: CPTII,S$GLB,, | Performed by: INTERNAL MEDICINE

## 2019-11-27 PROCEDURE — 83036 HEMOGLOBIN GLYCOSYLATED A1C: CPT

## 2019-11-27 PROCEDURE — 99214 OFFICE O/P EST MOD 30 MIN: CPT | Mod: 25,S$GLB,, | Performed by: INTERNAL MEDICINE

## 2019-11-27 PROCEDURE — 3008F PR BODY MASS INDEX (BMI) DOCUMENTED: ICD-10-PCS | Mod: CPTII,S$GLB,, | Performed by: INTERNAL MEDICINE

## 2019-11-27 PROCEDURE — 99999 PR PBB SHADOW E&M-EST. PATIENT-LVL IV: CPT | Mod: PBBFAC,,, | Performed by: INTERNAL MEDICINE

## 2019-11-27 PROCEDURE — 82607 VITAMIN B-12: CPT

## 2019-11-27 PROCEDURE — 90686 IIV4 VACC NO PRSV 0.5 ML IM: CPT | Mod: S$GLB,,, | Performed by: INTERNAL MEDICINE

## 2019-11-27 PROCEDURE — 84207 ASSAY OF VITAMIN B-6: CPT

## 2019-11-27 PROCEDURE — 90471 IMMUNIZATION ADMIN: CPT | Mod: S$GLB,,, | Performed by: INTERNAL MEDICINE

## 2019-11-27 PROCEDURE — 99214 PR OFFICE/OUTPT VISIT, EST, LEVL IV, 30-39 MIN: ICD-10-PCS | Mod: 25,S$GLB,, | Performed by: INTERNAL MEDICINE

## 2019-11-27 PROCEDURE — 83540 ASSAY OF IRON: CPT

## 2019-11-27 PROCEDURE — 90471 FLU VACCINE (QUAD) GREATER THAN OR EQUAL TO 3YO PRESERVATIVE FREE IM: ICD-10-PCS | Mod: S$GLB,,, | Performed by: INTERNAL MEDICINE

## 2019-11-27 PROCEDURE — 82728 ASSAY OF FERRITIN: CPT

## 2019-11-27 PROCEDURE — 99999 PR PBB SHADOW E&M-EST. PATIENT-LVL I: CPT | Mod: PBBFAC,,,

## 2019-11-27 PROCEDURE — 99999 PR PBB SHADOW E&M-EST. PATIENT-LVL IV: ICD-10-PCS | Mod: PBBFAC,,, | Performed by: INTERNAL MEDICINE

## 2019-11-27 PROCEDURE — 90686 FLU VACCINE (QUAD) GREATER THAN OR EQUAL TO 3YO PRESERVATIVE FREE IM: ICD-10-PCS | Mod: S$GLB,,, | Performed by: INTERNAL MEDICINE

## 2019-11-27 PROCEDURE — 82306 VITAMIN D 25 HYDROXY: CPT

## 2019-11-27 PROCEDURE — 36415 COLL VENOUS BLD VENIPUNCTURE: CPT

## 2019-11-27 PROCEDURE — 99999 PR PBB SHADOW E&M-EST. PATIENT-LVL I: ICD-10-PCS | Mod: PBBFAC,,,

## 2019-11-27 RX ORDER — PHENTERMINE HYDROCHLORIDE 37.5 MG/1
37.5 TABLET ORAL
Qty: 30 TABLET | Refills: 2 | Status: SHIPPED | OUTPATIENT
Start: 2019-11-27 | End: 2020-03-10 | Stop reason: SDUPTHER

## 2019-11-27 RX ORDER — OMEPRAZOLE 40 MG/1
40 CAPSULE, DELAYED RELEASE ORAL DAILY
Qty: 90 CAPSULE | Refills: 4 | Status: SHIPPED | OUTPATIENT
Start: 2019-11-27 | End: 2020-12-05 | Stop reason: SDUPTHER

## 2019-11-27 RX ORDER — CETIRIZINE HYDROCHLORIDE 10 MG/1
10 TABLET ORAL DAILY PRN
Qty: 90 TABLET | Refills: 3 | Status: SHIPPED | OUTPATIENT
Start: 2019-11-27 | End: 2020-12-05 | Stop reason: SDUPTHER

## 2019-11-27 RX ORDER — PHENTERMINE HYDROCHLORIDE 37.5 MG/1
TABLET ORAL
COMMUNITY
Start: 2019-09-12 | End: 2019-11-27 | Stop reason: SDUPTHER

## 2019-11-27 RX ORDER — ACETAMINOPHEN 500 MG
1 TABLET ORAL DAILY
Qty: 90 CAPSULE | Refills: 3 | Status: SHIPPED | OUTPATIENT
Start: 2019-11-27 | End: 2020-12-05 | Stop reason: SDUPTHER

## 2019-11-27 NOTE — PROGRESS NOTES
INTERNAL MEDICINE CLINIC    Initial Visit to Establish Care    PRESENTING HISTORY     Previous PCP: Jessy Solorzano MD  Chief Complaint/Reason for Visit:     Chief Complaint   Patient presents with    Annual Exam     History of Present Illness & ROS : Ms. Mela Blanco is a 33 y.o. female.      Here to establish care since Dr. Solorzano is too far.    No problems currently.    No chest pain or SOB.    PAST HISTORY:     Past Medical History:   Diagnosis Date    Bariatric surgery status 5/25/2015    BMI 32.0-32.9,adult 11/27/2019    Current use of proton pump inhibitor 11/27/2019    IUD (intrauterine device) in place 11/27/2019    Miscarriage     NSAID induced gastritis 3/7/2018    Perennial allergic rhinitis 11/27/2019    Vitamin D insufficiency 11/27/2019       Past Surgical History:   Procedure Laterality Date    breast reduction      CARPAL TUNNEL RELEASE Right 01/12/2018    CARPAL TUNNEL RELEASE Left 10/19/2018    Procedure: RELEASE, CARPAL TUNNEL;  Surgeon: Michael Agrawal Jr., MD;  Location: [facility];  Service: Plastics;  Laterality: Left;    COSMETIC SURGERY      hematoma surgery      LAPAROSCOPIC GASTRIC BANDING         Family History   Problem Relation Age of Onset    Diabetes Mother     Hypertension Mother     Hyperlipidemia Mother     Glaucoma Mother     Arthritis Father     Hypertension Father     Diabetes Maternal Grandmother     Amblyopia Neg Hx     Blindness Neg Hx     Cancer Neg Hx     Cataracts Neg Hx     Macular degeneration Neg Hx     Retinal detachment Neg Hx     Strabismus Neg Hx     Stroke Neg Hx     Thyroid disease Neg Hx        Social History     Socioeconomic History    Marital status: Single     Spouse name: Not on file    Number of children: 1    Years of education: Not on file    Highest education level: Not on file   Occupational History    Not on file   Social Needs    Financial resource strain: Not on file    Food insecurity:      Worry: Not on file     Inability: Not on file    Transportation needs:     Medical: Not on file     Non-medical: Not on file   Tobacco Use    Smoking status: Never Smoker    Smokeless tobacco: Never Used   Substance and Sexual Activity    Alcohol use: Yes     Comment: occasional.    Drug use: No    Sexual activity: Yes     Partners: Male   Lifestyle    Physical activity:     Days per week: Not on file     Minutes per session: Not on file    Stress: Not on file   Relationships    Social connections:     Talks on phone: Not on file     Gets together: Not on file     Attends Taoism service: Not on file     Active member of club or organization: Not on file     Attends meetings of clubs or organizations: Not on file     Relationship status: Not on file   Other Topics Concern    Not on file   Social History Narrative    Single    In a relationship    1 daughter    Works for [employer].       MEDICATIONS & ALLERGIES:     Current Outpatient Medications on File Prior to Visit   Medication Sig Dispense Refill           caffeine (AWAKE) 200 mg Tab Take 200 mg by mouth every 4 (four) hours as needed.      cetirizine (ZYRTEC) 10 MG tablet TAKE 1 TABLET(10 MG) BY MOUTH EVERY DAY FOR ALLERGIES 90 tablet 1                                       omeprazole (PRILOSEC) 40 MG capsule Take 1 capsule (40 mg total) by mouth once daily. 90 capsule 4           phentermine (ADIPEX-P) 37.5 MG capsule Adipex-P 37.5 mg capsule   Take 1 capsule every day by oral route.                                   No current facility-administered medications on file prior to visit.         Review of patient's allergies indicates:  No Known Allergies    Medications Reconciliation:   I have reconciled the patient's home medications with the patient/family. I have updated all changes.  Refer to After-Visit Medication List.    OBJECTIVE:     Vital Signs:  Vitals:    11/27/19 0911   BP: 120/80   Pulse: 80     Wt Readings from Last 1  Encounters:   11/27/19 0851 89 kg (196 lb 3.4 oz)     Body mass index is 32.65 kg/m².     Physical Exam:  General: Well developed, well nourished. No distress.  HEENT: Head is normocephalic, atraumatic   Eyes: Clear conjunctiva.  Neck: Supple, symmetrical neck; trachea midline.  Lungs: Clear to auscultation bilaterally and normal respiratory effort.  Cardiovascular: Heart with regular rate and rhythm.    Extremities: No LE edema. Pulses 2+ and symmetric.   Abdomen: Abdomen is soft, non-tender non-distended with normal bowel sounds.  Skin: Skin color, texture, turgor normal. No rashes.  Musculoskeletal: Normal gait.   Lymph Nodes: No cervical or supraclavicular adenopathy.  Neurologic: Normal strength and tone. No focal numbness or weakness.   Psychiatric: Normal affect. Alert.    Laboratory  Lab Results   Component Value Date    WBC 3.75 (L) 10/17/2019    HGB 13.6 10/17/2019    HCT 41.5 10/17/2019     10/17/2019    CHOL 147 10/06/2017    TRIG 56 05/25/2015    HDL 66 05/25/2015    ALT 15 10/17/2019    AST 13 10/17/2019     (L) 10/17/2019    K 3.9 10/17/2019     10/17/2019    CREATININE 0.9 10/17/2019    BUN 15 10/17/2019    CO2 23 10/17/2019    TSH 0.672 05/25/2015    HGBA1C 5.0 10/06/2017       ASSESSMENT & PLAN:     Establishing care with new doctor, encounter for  - Reviewed and updated past and current medical problems.  Discussed treatment of current medical problems      Perennial allergic rhinitis  -     cetirizine (ZYRTEC) 10 MG tablet; Take 1 tablet (10 mg total) by mouth daily as needed for Allergies.  Dispense: 90 tablet; Refill: 3    NSAID induced gastritis  Current use of proton pump inhibitor  -     omeprazole (PRILOSEC) 40 MG capsule; Take 1 capsule (40 mg total) by mouth once daily.  Dispense: 90 capsule; Refill: 4    IUD (intrauterine device) in place    Bariatric surgery status  BMI 32.0-32.9,adult  - Was 270 lbs before bariatric surgery    On Adipex since 2012.    Plan:  -      Echo Color Flow Doppler? Yes; Future  -     phentermine (ADIPEX-P) 37.5 mg tablet; Take 1 tablet (37.5 mg total) by mouth before breakfast.  Dispense: 30 tablet; Refill: 2  -     cholecalciferol, vitamin D3, (VITAMIN D3) 50 mcg (2,000 unit) Cap; Take 1 capsule (2,000 Units total) by mouth once daily.  Dispense: 90 capsule; Refill: 3  -     Vitamin B12; Future; Expected date: 11/27/2019  -     VITAMIN B6; Future; Expected date: 11/27/2019  -     Vitamin D; Future; Expected date: 05/25/2020  -     Hemoglobin A1c; Future; Expected date: 11/27/2019  -     Iron and TIBC; Future; Expected date: 11/27/2019  -     Ferritin; Future; Expected date: 11/27/2019    Vitamin D insufficiency  - Rx Vitamin D 2000 IU daily.    Preventive Health Maintenance:  Flu vaccine today.    Return to Clinic for Follow Up with me:   6 months.    Scheduled Follow-up :  Future Appointments   Date Time Provider Department Center   [facility]       After Visit Medication List :     Medication List           Accurate as of November 27, 2019 12:07 PM. If you have any questions, ask your nurse or doctor.               START taking these medications    cholecalciferol (vitamin D3) 50 mcg (2,000 unit) Cap  Commonly known as:  Vitamin D3  Take 1 capsule (2,000 Units total) by mouth once daily.  Started by:  Agus Florez MD        CHANGE how you take these medications    cetirizine 10 MG tablet  Commonly known as:  ZYRTEC  Take 1 tablet (10 mg total) by mouth daily as needed for Allergies.  What changed:  See the new instructions.  Changed by:  Agus Florez MD     phentermine 37.5 mg tablet  Commonly known as:  ADIPEX-P  Take 1 tablet (37.5 mg total) by mouth before breakfast.  What changed:    · See the new instructions.  · Another medication with the same name was removed. Continue taking this medication, and follow the directions you see here.  Changed by:  Agus Florez MD        CONTINUE taking these  medications    Awake 200 mg Tab  Generic drug:  caffeine     levonorgestrel 20 mcg/24 hours (5 yrs) 52 mg IUD  Commonly known as:  MIRENA     mupirocin 2 % ointment  Commonly known as:  BACTROBAN  Apply to affected area 3 times daily     omeprazole 40 MG capsule  Commonly known as:  PRILOSEC  Take 1 capsule (40 mg total) by mouth once daily.        STOP taking these medications    acetaminophen 650 MG Tbsr  Commonly known as:  TYLENOL  Stopped by:  Agus Florez MD     HYDROcodone-acetaminophen 5-325 mg per tablet  Commonly known as:  NORCO  Stopped by:  Agus Florez MD     ibuprofen-famotidine 800-26.6 mg Tab  Commonly known as:  DUEXIS  Stopped by:  Agus Florez MD     Jublia 10 % Ana  Generic drug:  efinaconazole  Stopped by:  Agus Florez MD     PriLOSEC 10 mg Sudr  Generic drug:  omeprazole magnesium  Stopped by:  Agus Florez MD     ranitidine 150 MG capsule  Commonly known as:  ZANTAC  Stopped by:  Agus Florez MD     traMADol 50 mg tablet  Commonly known as:  ULTRAM  Stopped by:  Agus Florez MD           Where to Get Your Medications      These medications were sent to [id number] - [place] - [address]  [address] [facility] [address]  [address] [place] [id number]    Hours:  24-hours Phone:  [phone]   · cetirizine 10 MG tablet  · cholecalciferol (vitamin D3) 50 mcg (2,000 unit) Cap  · omeprazole 40 MG capsule  · phentermine 37.5 mg tablet         Signing Physician:  Agus Florez MD

## 2019-12-09 ENCOUNTER — PATIENT MESSAGE (OUTPATIENT)
Dept: SPORTS MEDICINE | Facility: CLINIC | Age: 34
End: 2019-12-09

## 2019-12-09 ENCOUNTER — TELEPHONE (OUTPATIENT)
Dept: SPORTS MEDICINE | Facility: CLINIC | Age: 34
End: 2019-12-09

## 2019-12-12 ENCOUNTER — OFFICE VISIT (OUTPATIENT)
Dept: SPORTS MEDICINE | Facility: CLINIC | Age: 34
End: 2019-12-12
Payer: COMMERCIAL

## 2019-12-12 VITALS
HEART RATE: 98 BPM | WEIGHT: 196 LBS | HEIGHT: 65 IN | DIASTOLIC BLOOD PRESSURE: 88 MMHG | BODY MASS INDEX: 32.65 KG/M2 | SYSTOLIC BLOOD PRESSURE: 138 MMHG

## 2019-12-12 DIAGNOSIS — M25.561 CHRONIC PAIN OF RIGHT KNEE: ICD-10-CM

## 2019-12-12 DIAGNOSIS — G89.29 CHRONIC PAIN OF RIGHT KNEE: ICD-10-CM

## 2019-12-12 DIAGNOSIS — M25.361 KNEE INSTABILITY, RIGHT: ICD-10-CM

## 2019-12-12 DIAGNOSIS — M25.461 EFFUSION OF RIGHT KNEE: Primary | ICD-10-CM

## 2019-12-12 PROCEDURE — 99213 OFFICE O/P EST LOW 20 MIN: CPT | Mod: 25,S$GLB,, | Performed by: PHYSICIAN ASSISTANT

## 2019-12-12 PROCEDURE — 99999 PR PBB SHADOW E&M-EST. PATIENT-LVL III: ICD-10-PCS | Mod: PBBFAC,,, | Performed by: PHYSICIAN ASSISTANT

## 2019-12-12 PROCEDURE — 20610 PR DRAIN/INJECT LARGE JOINT/BURSA: ICD-10-PCS | Mod: RT,S$GLB,, | Performed by: PHYSICIAN ASSISTANT

## 2019-12-12 PROCEDURE — 99213 PR OFFICE/OUTPT VISIT, EST, LEVL III, 20-29 MIN: ICD-10-PCS | Mod: 25,S$GLB,, | Performed by: PHYSICIAN ASSISTANT

## 2019-12-12 PROCEDURE — 20610 DRAIN/INJ JOINT/BURSA W/O US: CPT | Mod: RT,S$GLB,, | Performed by: PHYSICIAN ASSISTANT

## 2019-12-12 PROCEDURE — 99999 PR PBB SHADOW E&M-EST. PATIENT-LVL III: CPT | Mod: PBBFAC,,, | Performed by: PHYSICIAN ASSISTANT

## 2019-12-12 PROCEDURE — 3008F BODY MASS INDEX DOCD: CPT | Mod: CPTII,S$GLB,, | Performed by: PHYSICIAN ASSISTANT

## 2019-12-12 PROCEDURE — 3008F PR BODY MASS INDEX (BMI) DOCUMENTED: ICD-10-PCS | Mod: CPTII,S$GLB,, | Performed by: PHYSICIAN ASSISTANT

## 2019-12-12 RX ORDER — BUPIVACAINE HYDROCHLORIDE 2.5 MG/ML
3 INJECTION, SOLUTION INFILTRATION; PERINEURAL
Status: COMPLETED | OUTPATIENT
Start: 2019-12-12 | End: 2019-12-12

## 2019-12-12 RX ORDER — TRIAMCINOLONE ACETONIDE 40 MG/ML
40 INJECTION, SUSPENSION INTRA-ARTICULAR; INTRAMUSCULAR
Status: COMPLETED | OUTPATIENT
Start: 2019-12-12 | End: 2019-12-12

## 2019-12-12 RX ADMIN — BUPIVACAINE HYDROCHLORIDE 7.5 MG: 2.5 INJECTION, SOLUTION INFILTRATION; PERINEURAL at 02:12

## 2019-12-12 RX ADMIN — TRIAMCINOLONE ACETONIDE 40 MG: 40 INJECTION, SUSPENSION INTRA-ARTICULAR; INTRAMUSCULAR at 02:12

## 2019-12-12 NOTE — PROGRESS NOTES
Subjective:          Chief Complaint: Mela Blanco is a 33 y.o. female who had concerns including Pain of the Right Knee.      Pain   Associated symptoms include joint swelling. Pertinent negatives include no abdominal pain, chest pain, chills, congestion, coughing, fever, headaches, myalgias, nausea, numbness, rash, sore throat or vomiting.      Patient who is a  presents to clinic with bilateral knee pain. She has had right knee pain x 3-4 years. Pain is worse with walking, standing, kneeling, stairs, and increased activity. Pain is better with rest. She has been experiencing swelling in her right knee towards the end of the day. Pain is located in the posterior knee and above patella. She reports increased crepitus in her knees. Pain at rest is 0/10. Pain with any activity is 4-5/10. She has been taking Ibuprofen 800mg one to two times a day. -mechanical symptoms,  - instability. She received a right knee Synvisc one injection in her knee on 10/23/19 but received no benefits from the injection. She had it 6 months ago as well with significant relief of pain. She is requesting a right knee CSI today. She received a left knee CSI on 11/6/2019 with relief of pain in left knee.      Review of Systems   Constitution: Negative. Negative for chills, fever, weight gain and weight loss.   HENT: Negative for congestion and sore throat.    Eyes: Negative for blurred vision and double vision.   Cardiovascular: Negative for chest pain, leg swelling and palpitations.   Respiratory: Negative for cough and shortness of breath.    Hematologic/Lymphatic: Does not bruise/bleed easily.   Skin: Negative for itching, poor wound healing and rash.   Musculoskeletal: Positive for joint pain, joint swelling and stiffness. Negative for back pain, muscle weakness and myalgias.   Gastrointestinal: Negative for abdominal pain, constipation, diarrhea, nausea and vomiting.   Genitourinary: Negative.  Negative for  frequency and hematuria.   Neurological: Negative for dizziness, headaches, numbness, paresthesias and sensory change.   Psychiatric/Behavioral: Negative for altered mental status and depression. The patient is not nervous/anxious.    Allergic/Immunologic: Negative for hives.                   Objective:        General: Mela is well-developed, well-nourished, appears stated age, in no acute distress, alert and oriented to time, place and person.     General    Vitals reviewed.  Constitutional: She is oriented to person, place, and time. She appears well-developed and well-nourished. No distress.   HENT:   Head: Normocephalic and atraumatic.   Eyes: EOM are normal.   Neck: Normal range of motion.   Cardiovascular: Normal rate and regular rhythm.    Pulmonary/Chest: Effort normal. No respiratory distress.   Neurological: She is alert and oriented to person, place, and time. She has normal reflexes. No cranial nerve deficit. Coordination normal.   Psychiatric: She has a normal mood and affect. Her behavior is normal. Judgment and thought content normal.     General Musculoskeletal Exam   Gait: normal       Right Knee Exam     Inspection   Erythema: absent  Scars: absent  Swelling: absent  Effusion: present  Deformity: absent  Bruising: absent    Tenderness   The patient is tender to palpation of the patella and medial hamstring.    Range of Motion   Extension:  0 normal   Flexion:  130 abnormal     Tests   Meniscus   Tracy:  Medial - negative Lateral - positive  Ligament Examination Lachman: normal (-1 to 2mm) PCL-Posterior Drawer: normal (0 to 2mm)     MCL - Valgus: normal (0 to 2mm)  LCL - Varus: normalPivot Shift: normal (Equal)Reverse Pivot Shift: normal (Equal)  Posterolateral Corner: unstable (>15 degrees difference)  Patella   Passive Patellar Tilt: neutral  Patellar Glide (quadrants): Lateral - 1   Medial - 2  Patellar Grind: positive    Other   Sensation: normal    Left Knee Exam     Inspection    Erythema: absent  Scars: absent  Swelling: absent  Effusion: absent  Deformity: absent  Bruising: absent    Tenderness   The patient is experiencing no tenderness.     Crepitus   The patient has crepitus of the patella.    Range of Motion   Extension:  0 normal   Flexion:  140 normal     Tests   Meniscus   Tracy:  Medial - negative Lateral - negative  Stability Lachman: normal (-1 to 2mm) PCL-Posterior Drawer: normal (0 to 2mm)  MCL - Valgus: normal (0 to 2mm)  LCL - Varus: normal (0 to 2mm)Pivot Shift: normal (Equal)Reverse Pivot Shift: normal (Equal)  Posterolateral Corner: unstable (>15 degrees difference)  Patella   Passive Patellar Tilt: neutral  Patellar Glide (Quadrants): Lateral - 1 Medial - 2  Patellar Grind: negative    Other   Sensation: normal    Muscle Strength   Right Lower Extremity   Hip Abduction: 5/5   Quadriceps:  5/5   Hamstrin/5   Left Lower Extremity   Hip Abduction: 5/5   Quadriceps:  5/5   Hamstrin/5     Reflexes     Left Side  Quadriceps:  2+  Achilles:  2+    Right Side   Quadriceps:  2+  Achilles:  2+    Vascular Exam     Right Pulses  Dorsalis Pedis:      2+  Posterior Tibial:      2+        Left Pulses  Dorsalis Pedis:      2+  Posterior Tibial:      2+          RADIOGRAPHS:  Bilateral knees:  FINDINGS:  No fracture or dislocation.  No bone destruction identified.  Mild patellofemoral misalignment.    MRI results right knee: (17)  Findings:     Menisci:  No signal abnormality to suggest meniscal tear.     Cruciate ligaments: Abnormally thickened ACL with increased T2 signal without evidence of tendon disruption.  Small intracondylar cyst near the femoral attachment along the posterior femoral condyle and associated moderate degree of bone marrow edema within the tibial plateau. PCL is intact.     Collateral ligaments: The medial and lateral collateral ligaments are intact.    Extensor mechanism: No tear.  Patellar and quadriceps tendons are intact.    Cartilage: Mild  fissuring of the cartilage within the weightbearing aspect of the medial tibial femoral compartment and mild heterogeneous T2 signal within the lateral patellofemoral compartment. No full thickness cartilaginous defect.    Marrow: No additional bone marrow edema. No fractures.    Joint fluid and synovium: Small suprapatellar joint effusion.  No synovial abnormality.    Other: Mild lateral subluxation of the patella.  No evidence of injury to the medial patellar retinaculum or MPFL, noting mild soft tissue edema signal along the lateral aspect of the inferior patellar pole.  No definite trochlear dysplasia.        Assessment:       Encounter Diagnoses   Name Primary?    Effusion of right knee Yes    Knee instability, right     Chronic pain of right knee           Plan:       1.. Continue Ibuprofen as needed for pain    2.  Continue Viscoskin    3. Injection Procedure right knee  A time out was performed, including verification of patient ID, procedure, site and side, availability of information and equipment, review of safety issues, and agreement with consent, the procedure site was marked.    After time out was performed, the patient was prepped aseptically with povidone-iodine swabsticks. A diagnostic and therapeutic injection of 1:3cc Kenalog/Marcaine was given under sterile technique using a 22g x 1.5 needle from the Superolateral  aspect of the left Knee Joint in the supine position.      Mela Blanco had no adverse reactions to the medication. Pain decreased. She was instructed to apply ice to the joint for 20 minutes and avoid strenuous activities for 24-36 hours following the injection. She was warned of possible blood sugar and/or blood pressure changes during that time. Following that time, she can resume regular activities.    She was reminded to call the clinic immediately for any adverse side effects as explained in clinic today.    4. MRI right knee to rule out meniscus tear/evaluate  cartilage. MRI is 2 years old    5. RTC in 1 week with Leandra Patrick PA-C for MRI results review. Possible knee arthroscopy if continued pain.                 Patient questionnaires may have been collected.

## 2019-12-17 ENCOUNTER — HOSPITAL ENCOUNTER (OUTPATIENT)
Dept: CARDIOLOGY | Facility: CLINIC | Age: 34
Discharge: HOME | End: 2019-12-17
Attending: INTERNAL MEDICINE
Payer: COMMERCIAL

## 2019-12-17 VITALS
DIASTOLIC BLOOD PRESSURE: 82 MMHG | SYSTOLIC BLOOD PRESSURE: 114 MMHG | WEIGHT: 196 LBS | HEART RATE: 90 BPM | BODY MASS INDEX: 32.65 KG/M2 | HEIGHT: 65 IN

## 2019-12-17 DIAGNOSIS — Z98.84 BARIATRIC SURGERY STATUS: ICD-10-CM

## 2019-12-17 LAB
ASCENDING AORTA: 2.36 CM
AV INDEX (PROSTH): 0.74
AV MEAN GRADIENT: 4 MMHG
AV PEAK GRADIENT: 8 MMHG
AV VALVE AREA: 2.27 CM2
AV VELOCITY RATIO: 0.8
BSA FOR ECHO PROCEDURE: 2.02 M2
CV ECHO LV RWT: 0.27 CM
DOP CALC AO PEAK VEL: 1.39 M/S
DOP CALC AO VTI: 25.38 CM
DOP CALC LVOT AREA: 3.1 CM2
DOP CALC LVOT DIAMETER: 1.98 CM
DOP CALC LVOT PEAK VEL: 1.11 M/S
DOP CALC LVOT STROKE VOLUME: 57.52 CM3
DOP CALCLVOT PEAK VEL VTI: 18.69 CM
E WAVE DECELERATION TIME: 147.9 MSEC
E/A RATIO: 1.47
E/E' RATIO: 5.4 M/S
ECHO LV POSTERIOR WALL: 0.59 CM (ref 0.6–1.1)
FRACTIONAL SHORTENING: 33 % (ref 28–44)
INTERVENTRICULAR SEPTUM: 0.77 CM (ref 0.6–1.1)
IVRT: 0.08 MSEC
LA MAJOR: 4.98 CM
LA MINOR: 5.08 CM
LA WIDTH: 3.68 CM
LEFT ATRIUM SIZE: 3.25 CM
LEFT ATRIUM VOLUME INDEX: 26.1 ML/M2
LEFT ATRIUM VOLUME: 51.13 CM3
LEFT INTERNAL DIMENSION IN SYSTOLE: 2.97 CM (ref 2.1–4)
LEFT VENTRICLE DIASTOLIC VOLUME INDEX: 45.2 ML/M2
LEFT VENTRICLE DIASTOLIC VOLUME: 88.65 ML
LEFT VENTRICLE MASS INDEX: 46 G/M2
LEFT VENTRICLE SYSTOLIC VOLUME INDEX: 17.4 ML/M2
LEFT VENTRICLE SYSTOLIC VOLUME: 34.13 ML
LEFT VENTRICULAR INTERNAL DIMENSION IN DIASTOLE: 4.42 CM (ref 3.5–6)
LEFT VENTRICULAR MASS: 89.42 G
LV LATERAL E/E' RATIO: 4.76 M/S
LV SEPTAL E/E' RATIO: 6.23 M/S
MV PEAK A VEL: 0.55 M/S
MV PEAK E VEL: 0.81 M/S
PISA TR MAX VEL: 2.66 M/S
PULM VEIN S/D RATIO: 1.43
PV PEAK D VEL: 0.46 M/S
PV PEAK S VEL: 0.66 M/S
RA MAJOR: 4.25 CM
RA PRESSURE: 8 MMHG
RA WIDTH: 3.79 CM
RIGHT VENTRICULAR END-DIASTOLIC DIMENSION: 3.49 CM
RV TISSUE DOPPLER FREE WALL SYSTOLIC VELOCITY 1 (APICAL 4 CHAMBER VIEW): 15.51 CM/S
SINUS: 2.8 CM
STJ: 2.17 CM
TDI LATERAL: 0.17 M/S
TDI SEPTAL: 0.13 M/S
TDI: 0.15 M/S
TR MAX PG: 28 MMHG
TRICUSPID ANNULAR PLANE SYSTOLIC EXCURSION: 2.45 CM
TV REST PULMONARY ARTERY PRESSURE: 36 MMHG

## 2019-12-17 PROCEDURE — 93306 TTE W/DOPPLER COMPLETE: CPT

## 2019-12-17 PROCEDURE — 93306 ECHO (CUPID ONLY): ICD-10-PCS | Mod: 26,,, | Performed by: INTERNAL MEDICINE

## 2019-12-17 PROCEDURE — 93306 TTE W/DOPPLER COMPLETE: CPT | Mod: 26,,, | Performed by: INTERNAL MEDICINE

## 2019-12-23 ENCOUNTER — TELEPHONE (OUTPATIENT)
Dept: ORTHOPEDICS | Facility: HOSPITAL | Age: 34
End: 2019-12-23

## 2019-12-23 ENCOUNTER — TELEPHONE (OUTPATIENT)
Dept: SPORTS MEDICINE | Facility: CLINIC | Age: 34
End: 2019-12-23

## 2019-12-23 ENCOUNTER — PATIENT MESSAGE (OUTPATIENT)
Dept: SPORTS MEDICINE | Facility: CLINIC | Age: 34
End: 2019-12-23

## 2019-12-23 NOTE — TELEPHONE ENCOUNTER
----- Message from Kristin Robert sent at 12/23/2019  1:46 PM CST -----  Contact: Self  Pt is needing a call back to discuss getting paper work to [employer] Pt can be reached @ [phone]

## 2019-12-23 NOTE — TELEPHONE ENCOUNTER
Spoke to patient who needs to get FMLA paperwork to [employer]. Let her know she could drop it off and fill out the short FMLA/Diability form and [employer]anna could then take care of it. Patient will drop it off.

## 2019-12-30 ENCOUNTER — TELEPHONE (OUTPATIENT)
Dept: SPORTS MEDICINE | Facility: CLINIC | Age: 34
End: 2019-12-30

## 2019-12-30 NOTE — TELEPHONE ENCOUNTER
Received FMLA forms from  patient. Forms completed and faxed to [phone] per the patients request.    [employer] also placed in the certified mailing envelope that the patient provided and placed in the mail.    Janna Gurrola MA  [facility]

## 2020-01-07 ENCOUNTER — HOSPITAL ENCOUNTER (OUTPATIENT)
Dept: RADIOLOGY | Facility: HOSPITAL | Age: 35
Discharge: HOME | End: 2020-01-07
Attending: PHYSICIAN ASSISTANT
Payer: COMMERCIAL

## 2020-01-07 DIAGNOSIS — G89.29 CHRONIC PAIN OF RIGHT KNEE: ICD-10-CM

## 2020-01-07 DIAGNOSIS — M25.461 EFFUSION OF RIGHT KNEE: ICD-10-CM

## 2020-01-07 DIAGNOSIS — M25.561 CHRONIC PAIN OF RIGHT KNEE: ICD-10-CM

## 2020-01-07 DIAGNOSIS — M25.361 KNEE INSTABILITY, RIGHT: ICD-10-CM

## 2020-01-07 PROCEDURE — 73721 MRI JNT OF LWR EXTRE W/O DYE: CPT | Mod: 26,RT,, | Performed by: RADIOLOGY

## 2020-01-07 PROCEDURE — 73721 MRI JNT OF LWR EXTRE W/O DYE: CPT | Mod: TC,RT

## 2020-01-07 PROCEDURE — 73721 MRI KNEE WITHOUT CONTRAST RIGHT: ICD-10-PCS | Mod: 26,RT,, | Performed by: RADIOLOGY

## 2020-01-24 ENCOUNTER — TELEPHONE (OUTPATIENT)
Dept: SPORTS MEDICINE | Facility: CLINIC | Age: 35
End: 2020-01-24

## 2020-01-27 ENCOUNTER — PATIENT MESSAGE (OUTPATIENT)
Dept: SPORTS MEDICINE | Facility: CLINIC | Age: 35
End: 2020-01-27

## 2020-01-28 ENCOUNTER — TELEPHONE (OUTPATIENT)
Dept: SPORTS MEDICINE | Facility: CLINIC | Age: 35
End: 2020-01-28

## 2020-03-31 ENCOUNTER — PATIENT MESSAGE (OUTPATIENT)
Dept: INTERNAL MEDICINE | Facility: CLINIC | Age: 35
End: 2020-03-31

## 2020-04-01 ENCOUNTER — OFFICE VISIT (OUTPATIENT)
Dept: INTERNAL MEDICINE | Facility: CLINIC | Age: 35
End: 2020-04-01
Payer: COMMERCIAL

## 2020-04-01 ENCOUNTER — PATIENT MESSAGE (OUTPATIENT)
Dept: INTERNAL MEDICINE | Facility: CLINIC | Age: 35
End: 2020-04-01

## 2020-04-01 DIAGNOSIS — E55.9 VITAMIN D INSUFFICIENCY: ICD-10-CM

## 2020-04-01 DIAGNOSIS — J30.89 PERENNIAL ALLERGIC RHINITIS: Primary | ICD-10-CM

## 2020-04-01 PROCEDURE — 99213 PR OFFICE/OUTPT VISIT, EST, LEVL III, 20-29 MIN: ICD-10-PCS | Mod: 95,,, | Performed by: INTERNAL MEDICINE

## 2020-04-01 PROCEDURE — 99213 OFFICE O/P EST LOW 20 MIN: CPT | Mod: 95,,, | Performed by: INTERNAL MEDICINE

## 2020-04-01 RX ORDER — MONTELUKAST SODIUM 10 MG/1
10 TABLET ORAL NIGHTLY
Qty: 30 TABLET | Refills: 0 | Status: SHIPPED | OUTPATIENT
Start: 2020-04-01 | End: 2020-05-01

## 2020-04-01 RX ORDER — FLUTICASONE PROPIONATE 50 MCG
2 SPRAY, SUSPENSION (ML) NASAL 2 TIMES DAILY
Qty: 16 G | Refills: 12 | Status: SHIPPED | OUTPATIENT
Start: 2020-04-01 | End: 2020-12-05 | Stop reason: SDUPTHER

## 2020-04-01 NOTE — PROGRESS NOTES
INTERNAL MEDICINE CLINIC  TELEMEDICINE ENCOUNTER  Progress Note     PRESENTING HISTORY     PCP: Agus Florez MD    Last Clinic Visit with me: 11-    Current Chief Complaint/Problem:  COVID-19 symptoms    The patient location is: Home  Visit type: Virtual visit with synchronous audio and video    History of Present Illness & ROS: Ms. Mela Blanco is a 34 y.o. female.    She started feeling sick    She feels burning in her nose and chest.  Burning in eyes, fatigue and muscle ache.  Coughing with sneezing.   No fever.  Temperature < 99.   Took Tylenol arthritis.  Mild headache.    She works outside [employer].  2 workers at same office potential COVID.    Daughter is out of town.  Partner has lost of smell. Low grade fever.    She takes Zyrtec.  Has Ulises vapor too.      PAST HISTORY:     Past Medical History:   Diagnosis Date    Bariatric surgery status 5/25/2015    BMI 32.0-32.9,adult 11/27/2019    Current use of proton pump inhibitor 11/27/2019    IUD (intrauterine device) in place 11/27/2019    Miscarriage     NSAID induced gastritis 3/7/2018    Perennial allergic rhinitis 11/27/2019    Vitamin D insufficiency 11/27/2019       Past Surgical History:   Procedure Laterality Date    breast reduction      CARPAL TUNNEL RELEASE Right 01/12/2018    CARPAL TUNNEL RELEASE Left 10/19/2018    Procedure: RELEASE, CARPAL TUNNEL;  Surgeon: Michael Agrawal Jr., MD;  Location: [facility];  Service: Plastics;  Laterality: Left;    COSMETIC SURGERY      hematoma surgery      LAPAROSCOPIC GASTRIC BANDING         Family History   Problem Relation Age of Onset    Diabetes Mother     Hypertension Mother     Hyperlipidemia Mother     Glaucoma Mother     Arthritis Father     Hypertension Father     Diabetes Maternal Grandmother     Amblyopia Neg Hx     Blindness Neg Hx     Cancer Neg Hx     Cataracts Neg Hx     Macular degeneration Neg Hx     Retinal detachment Neg Hx     Strabismus  Neg Hx     Stroke Neg Hx     Thyroid disease Neg Hx        Social History     Socioeconomic History    Marital status: Single     Spouse name: Not on file    Number of children: 1    Years of education: Not on file    Highest education level: Not on file   Occupational History    Not on file   Social Needs    Financial resource strain: Not on file    Food insecurity:     Worry: Not on file     Inability: Not on file    Transportation needs:     Medical: Not on file     Non-medical: Not on file   Tobacco Use    Smoking status: Never Smoker    Smokeless tobacco: Never Used   Substance and Sexual Activity    Alcohol use: Yes     Comment: occasional.    Drug use: No    Sexual activity: Yes     Partners: Male   Lifestyle    Physical activity:     Days per week: Not on file     Minutes per session: Not on file    Stress: Not on file   Relationships    Social connections:     Talks on phone: Not on file     Gets together: Not on file     Attends Anabaptist service: Not on file     Active member of club or organization: Not on file     Attends meetings of clubs or organizations: Not on file     Relationship status: Not on file   Other Topics Concern    Not on file   Social History Narrative    Single    In a relationship    1 daughter    Works for [employer].       MEDICATIONS & ALLERGIES:     Current Outpatient Medications on File Prior to Visit   Medication Sig Dispense Refill    caffeine (AWAKE) 200 mg Tab Take 200 mg by mouth every 4 (four) hours as needed.      cetirizine (ZYRTEC) 10 MG tablet Take 1 tablet (10 mg total) by mouth daily as needed for Allergies. 90 tablet 3    cholecalciferol, vitamin D3, (VITAMIN D3) 50 mcg (2,000 unit) Cap Take 1 capsule (2,000 Units total) by mouth once daily. 90 capsule 3    levonorgestrel (MIRENA) 20 mcg/24 hr (5 years) IUD 1 each by Intrauterine route once.      mupirocin (BACTROBAN) 2 % ointment Apply to affected area 3 times daily 22 g 1    omeprazole  "(PRILOSEC) 40 MG capsule Take 1 capsule (40 mg total) by mouth once daily. 90 capsule 4    phentermine (ADIPEX-P) 37.5 mg tablet Take 1 tablet (37.5 mg total) by mouth before breakfast. 30 tablet 5     No current facility-administered medications on file prior to visit.         Review of patient's allergies indicates:  No Known Allergies    Medications Reconciliation:   I have reconciled the patient's home medications and discharge medications with the patient/family. I have updated all changes.  Refer to After-Visit Medication List.    OBJECTIVE:     Previous Weight and BMI:  Wt Readings from Last 1 Encounters:   12/17/19 1542 88.9 kg (196 lb)     There is no height or weight on file to calculate BMI.     Physical Exam per Video:  General: Well developed, well nourished. No distress.  HEENT: Mouth - normal tongue and visible oral mucosa per video.  Eyes: Clear conjunctiva.  Lungs: Normal respiratory effort. Able to hold breath for 10 sec.  Skin: No rashes.  Psychiatric: Normal affect. Alert.    Laboratory  Lab Results   Component Value Date    WBC 3.75 (L) 10/17/2019    HGB 13.6 10/17/2019    HCT 41.5 10/17/2019     10/17/2019    CHOL 147 10/06/2017    TRIG 56 05/25/2015    HDL 66 05/25/2015    ALT 15 10/17/2019    AST 13 10/17/2019     (L) 10/17/2019    K 3.9 10/17/2019     10/17/2019    CREATININE 0.9 10/17/2019    BUN 15 10/17/2019    CO2 23 10/17/2019    TSH 0.672 05/25/2015    HGBA1C 5.1 11/27/2019       ASSESSMENT & PLAN:     Perennial allergic rhinitis  -     fluticasone propionate (FLONASE) 50 mcg/actuation nasal spray; 2 sprays (100 mcg total) by Each Nostril route 2 (two) times daily.  Dispense: 16 g; Refill: 12  -     montelukast (SINGULAIR) 10 mg tablet; Take 1 tablet (10 mg total) by mouth every evening.  Dispense: 30 tablet; Refill: 0    Vitamin D insufficiency  - On Vitamin D 2000 IU daily.    Regimen to Help with Immunity During "Cold" Season:    1. Take Vitamin C 5640-1212 mg " daily.  2. Take Echinacea tea or supplement daily.  3. Take yogurt (Activia, Greek, Mongolian or Kefir) 1 serving twice daily.  4. Use Zinc Lozenges as needed if signs of scratchy throat.  5. Use hot tea with lemon and honey for scratchy throat symptoms.    Preventive Measures during COVID-19 Outbreak:   1. Hydrate well.   2. Eat foods high in antioxidants (such as blueberry, raspberry, green tea, etc).   3. Avoid NSAIDs (Aleve, Ibuprofen). Only take Tylenol for pain or fever.     Wash hands frequently  (20 sec with soap).   Practice social distancing.    Return to Clinic for Follow Up with me:   PRN    Total time spent with patient: 15 min  Each patient to whom he or she provides medical services by telemedicine is:  (1) informed of the relationship between the physician and patient and the respective role of any other health care provider with respect to management of the patient; and (2) notified that he or she may decline to receive medical services by telemedicine and may withdraw from such care at any time.      Scheduled Follow-up :  No future appointments.    After Visit Medication List :     Medication List           Accurate as of April 1, 2020  1:39 PM. If you have any questions, ask your nurse or doctor.               START taking these medications    fluticasone propionate 50 mcg/actuation nasal spray  Commonly known as:  FLONASE  2 sprays (100 mcg total) by Each Nostril route 2 (two) times daily.  Started by:  Agus Florez MD     montelukast 10 mg tablet  Commonly known as:  SINGULAIR  Take 1 tablet (10 mg total) by mouth every evening.  Started by:  Agus Florez MD        CONTINUE taking these medications    AWAKE 200 mg Tab  Generic drug:  caffeine     cetirizine 10 MG tablet  Commonly known as:  ZYRTEC  Take 1 tablet (10 mg total) by mouth daily as needed for Allergies.     cholecalciferol (vitamin D3) 50 mcg (2,000 unit) Cap  Commonly known as:  VITAMIN D3  Take 1 capsule (2,000 Units total) by  mouth once daily.     levonorgestreL 20 mcg/24 hours (5 yrs) 52 mg IUD  Commonly known as:  MIRENA     mupirocin 2 % ointment  Commonly known as:  BACTROBAN  Apply to affected area 3 times daily     omeprazole 40 MG capsule  Commonly known as:  PRILOSEC  Take 1 capsule (40 mg total) by mouth once daily.     phentermine 37.5 mg tablet  Commonly known as:  ADIPEX-P  Take 1 tablet (37.5 mg total) by mouth before breakfast.           Where to Get Your Medications      These medications were sent to [id number] - [place] - [address]  AT [facility] [address]  [address] [place] [address]    Hours:  24-hours Phone:  [phone]   · fluticasone propionate 50 mcg/actuation nasal spray  · montelukast 10 mg tablet         Signing Physician:  Agus Florez MD

## 2020-06-22 NOTE — PROGRESS NOTES
INTERNAL MEDICINE CLINIC  TELEMEDICINE ENCOUNTER  Progress Note     PRESENTING HISTORY     PCP: Agus Florez MD    Last Clinic Visit with me:  4-1-2020 Telemed, 11- Routine    Current Chief Complaint/Problem:  Skin, shoulder, and weight    The patient location is: Home  Visit type: Virtual visit with synchronous audio and video    History of Present Illness & ROS: Ms. Mela Blanco is a 34 y.o. female.    She wants a referral with right shoulder.  Back position she is has some pain with certain movement.  Also has fungal skin rash on the scalp.      Also would like to see Bariatric dept.    Review of Systems:  Answers for HPI/ROS submitted by the patient on 6/21/2020   activity change: No  unexpected weight change: No  neck pain: No  hearing loss: No  rhinorrhea: No  trouble swallowing: No  eye discharge: No  visual disturbance: No  chest tightness: No  wheezing: No  chest pain: No  palpitations: No  blood in stool: No  constipation: No  vomiting: No  diarrhea: No  polydipsia: No  polyuria: No  difficulty urinating: No  hematuria: No  menstrual problem: No  dysuria: No  joint swelling: No  arthralgias: No  headaches: No  weakness: No  confusion: No  dysphoric mood: No      PAST HISTORY:     Past Medical History:   Diagnosis Date    Bariatric surgery status 5/25/2015    BMI 32.0-32.9,adult 11/27/2019    Current use of proton pump inhibitor 11/27/2019    IUD (intrauterine device) in place 11/27/2019    Miscarriage     NSAID induced gastritis 3/7/2018    Perennial allergic rhinitis 11/27/2019    Vitamin D insufficiency 11/27/2019       Past Surgical History:   Procedure Laterality Date    breast reduction      CARPAL TUNNEL RELEASE Right 01/12/2018    CARPAL TUNNEL RELEASE Left 10/19/2018    Procedure: RELEASE, CARPAL TUNNEL;  Surgeon: Michael Agrawal Jr., MD;  Location: [facility];  Service: Plastics;  Laterality: Left;    COSMETIC SURGERY      hematoma surgery      LAPAROSCOPIC  GASTRIC BANDING         Family History   Problem Relation Age of Onset    Diabetes Mother     Hypertension Mother     Hyperlipidemia Mother     Glaucoma Mother     Arthritis Father     Hypertension Father     Diabetes Maternal Grandmother     Amblyopia Neg Hx     Blindness Neg Hx     Cancer Neg Hx     Cataracts Neg Hx     Macular degeneration Neg Hx     Retinal detachment Neg Hx     Strabismus Neg Hx     Stroke Neg Hx     Thyroid disease Neg Hx        Social History     Socioeconomic History    Marital status: Single     Spouse name: Not on file    Number of children: 1    Years of education: Not on file    Highest education level: Not on file   Occupational History    Not on file   Social Needs    Financial resource strain: Not on file    Food insecurity     Worry: Not on file     Inability: Not on file    Transportation needs     Medical: Not on file     Non-medical: Not on file   Tobacco Use    Smoking status: Never Smoker    Smokeless tobacco: Never Used   Substance and Sexual Activity    Alcohol use: Yes     Comment: occasional.    Drug use: No    Sexual activity: Yes     Partners: Male   Lifestyle    Physical activity     Days per week: Not on file     Minutes per session: Not on file    Stress: Not on file   Relationships    Social connections     Talks on phone: Not on file     Gets together: Not on file     Attends Jainism service: Not on file     Active member of club or organization: Not on file     Attends meetings of clubs or organizations: Not on file     Relationship status: Not on file   Other Topics Concern    Not on file   Social History Narrative    Single    In a relationship    1 daughter    Works for [employer].       MEDICATIONS & ALLERGIES:     Current Outpatient Medications on File Prior to Visit   Medication Sig Dispense Refill    caffeine (AWAKE) 200 mg Tab Take 200 mg by mouth every 4 (four) hours as needed.      cetirizine (ZYRTEC) 10 MG tablet  Take 1 tablet (10 mg total) by mouth daily as needed for Allergies. 90 tablet 3    cholecalciferol, vitamin D3, (VITAMIN D3) 50 mcg (2,000 unit) Cap Take 1 capsule (2,000 Units total) by mouth once daily. 90 capsule 3    fluticasone propionate (FLONASE) 50 mcg/actuation nasal spray 2 sprays (100 mcg total) by Each Nostril route 2 (two) times daily. 16 g 12    levonorgestrel (MIRENA) 20 mcg/24 hr (5 years) IUD 1 each by Intrauterine route once.      mupirocin (BACTROBAN) 2 % ointment Apply to affected area 3 times daily 22 g 1    omeprazole (PRILOSEC) 40 MG capsule Take 1 capsule (40 mg total) by mouth once daily. 90 capsule 4    phentermine (ADIPEX-P) 37.5 mg tablet Take 1 tablet (37.5 mg total) by mouth before breakfast. 30 tablet 5     No current facility-administered medications on file prior to visit.         Review of patient's allergies indicates:  No Known Allergies    Medications Reconciliation:   I have reconciled the patient's home medications and discharge medications with the patient/family. I have updated all changes.  Refer to After-Visit Medication List.    OBJECTIVE:     Previous Weight and BMI:  Wt Readings from Last 1 Encounters:   12/17/19 1542 88.9 kg (196 lb)     There is no height or weight on file to calculate BMI.     Physical Exam per Video and/or Audio:  Voice: Normal speech.  General: Well developed, well nourished. No distress.  HEENT: Face is symmetric.    Eyes: Clear conjunctiva.  Lungs: Normal respiratory effort.  Extremities: No LE edema.   Skin: hard to see changes on scalp with video.  Psychiatric: Normal affect. Alert.    Laboratory  Lab Results   Component Value Date    WBC 3.75 (L) 10/17/2019    HGB 13.6 10/17/2019    HCT 41.5 10/17/2019     10/17/2019    CHOL 147 10/06/2017    TRIG 56 05/25/2015    HDL 66 05/25/2015    ALT 15 10/17/2019    AST 13 10/17/2019     (L) 10/17/2019    K 3.9 10/17/2019     10/17/2019    CREATININE 0.9 10/17/2019    BUN 15  10/17/2019    CO2 23 10/17/2019    TSH 0.672 05/25/2015    HGBA1C 5.1 11/27/2019         ASSESSMENT & PLAN:     Acute pain of right shoulder  - Likely mild tendinitis.  -     Ambulatory referral/consult to Sports Medicine; Future; Expected date: 06/30/2020    Seborrheic dermatitis of scalp  -     ketoconazole (NIZORAL) 2 % cream; Apply topically 2 (two) times daily.  Dispense: 30 g; Refill: 3    Bariatric surgery status  BMI 32.0-32.9,adult  - Was 270 lbs before bariatric surgery    On Adipex since 2012.    -     phentermine (ADIPEX-P) 37.5 mg tablet; Take 1 tablet (37.5 mg total) by mouth before breakfast.      Plan:  -     Ambulatory referral/consult to Bariatric Medicine; Future; Expected date: 06/30/2020    Other Medical Problems Reviewed:    Perennial allergic rhinitis  -     cetirizine (ZYRTEC) 10 MG tablet; Take 1 tablet (10 mg total) by mouth daily as needed for Allergies.       NSAID induced gastritis  Current use of proton pump inhibitor  -     omeprazole (PRILOSEC) 40 MG capsule; Take 1 capsule (40 mg total) by mouth once daily.      IUD (intrauterine device) in place     Vitamin D insufficiency  - Rx Vitamin D 2000 IU daily.     Preventive Health Maintenance:  Up to date     Return to Clinic for Follow Up with me:   6 months.    Total time spent with patient: 15 min.  Each patient to whom he or she provides medical services by telemedicine is:  (1) informed of the relationship between the physician and patient and the respective role of any other health care provider with respect to management of the patient; and (2) notified that he or she may decline to receive medical services by telemedicine and may withdraw from such care at any time.      Scheduled Follow-up :  Future Appointments   Date Time Provider Department Center   7/20/2020  1:00 PM Amador Coats OD [facility] [place]   7/20/2020  4:00 PM Amador Coats OD [facility] [place]       After Visit Medication List :      Medication List          Accurate as of June 23, 2020  8:14 AM. If you have any questions, ask your nurse or doctor.            START taking these medications    ketoconazole 2 % cream  Commonly known as: NIZORAL  Apply topically 2 (two) times daily.        CONTINUE taking these medications    AWAKE 200 mg Tab  Generic drug: caffeine     cetirizine 10 MG tablet  Commonly known as: ZYRTEC  Take 1 tablet (10 mg total) by mouth daily as needed for Allergies.     cholecalciferol (vitamin D3) 50 mcg (2,000 unit) Cap  Commonly known as: VITAMIN D3  Take 1 capsule (2,000 Units total) by mouth once daily.     fluticasone propionate 50 mcg/actuation nasal spray  Commonly known as: FLONASE  2 sprays (100 mcg total) by Each Nostril route 2 (two) times daily.     levonorgestreL 20 mcg/24 hours (5 yrs) 52 mg IUD  Commonly known as: MIRENA     mupirocin 2 % ointment  Commonly known as: BACTROBAN  Apply to affected area 3 times daily     omeprazole 40 MG capsule  Commonly known as: PRILOSEC  Take 1 capsule (40 mg total) by mouth once daily.     phentermine 37.5 mg tablet  Commonly known as: ADIPEX-P  Take 1 tablet (37.5 mg total) by mouth before breakfast.           Where to Get Your Medications      These medications were sent to [id number] - [place] - [address]  AT [facility] [address]  [address] [place] [address]    Hours: 24-hours Phone: [phone]   · ketoconazole 2 % cream         Signing Physician:  Agus Florez MD

## 2020-06-23 ENCOUNTER — OFFICE VISIT (OUTPATIENT)
Dept: INTERNAL MEDICINE | Facility: CLINIC | Age: 35
End: 2020-06-23
Payer: COMMERCIAL

## 2020-06-23 DIAGNOSIS — L21.9 SEBORRHEIC DERMATITIS OF SCALP: ICD-10-CM

## 2020-06-23 DIAGNOSIS — M25.511 ACUTE PAIN OF RIGHT SHOULDER: Primary | ICD-10-CM

## 2020-06-23 PROCEDURE — 99213 PR OFFICE/OUTPT VISIT, EST, LEVL III, 20-29 MIN: ICD-10-PCS | Mod: 95,,, | Performed by: INTERNAL MEDICINE

## 2020-06-23 PROCEDURE — 99213 OFFICE O/P EST LOW 20 MIN: CPT | Mod: 95,,, | Performed by: INTERNAL MEDICINE

## 2020-06-23 RX ORDER — KETOCONAZOLE 20 MG/G
CREAM TOPICAL 2 TIMES DAILY
Qty: 30 G | Refills: 3 | Status: SHIPPED | OUTPATIENT
Start: 2020-06-23 | End: 2021-04-10 | Stop reason: SDUPTHER

## 2020-07-08 ENCOUNTER — OFFICE VISIT (OUTPATIENT)
Dept: SPORTS MEDICINE | Facility: CLINIC | Age: 35
End: 2020-07-08
Payer: COMMERCIAL

## 2020-07-08 ENCOUNTER — HOSPITAL ENCOUNTER (OUTPATIENT)
Dept: RADIOLOGY | Facility: HOSPITAL | Age: 35
Discharge: HOME | End: 2020-07-08
Attending: PHYSICIAN ASSISTANT
Payer: COMMERCIAL

## 2020-07-08 VITALS
BODY MASS INDEX: 32.65 KG/M2 | HEART RATE: 92 BPM | SYSTOLIC BLOOD PRESSURE: 122 MMHG | DIASTOLIC BLOOD PRESSURE: 82 MMHG | WEIGHT: 196 LBS | HEIGHT: 65 IN

## 2020-07-08 DIAGNOSIS — M25.511 ACUTE PAIN OF RIGHT SHOULDER: ICD-10-CM

## 2020-07-08 DIAGNOSIS — M25.811 SHOULDER IMPINGEMENT, RIGHT: Primary | ICD-10-CM

## 2020-07-08 PROCEDURE — 3008F BODY MASS INDEX DOCD: CPT | Mod: CPTII,S$GLB,, | Performed by: PHYSICIAN ASSISTANT

## 2020-07-08 PROCEDURE — 99214 PR OFFICE/OUTPT VISIT, EST, LEVL IV, 30-39 MIN: ICD-10-PCS | Mod: S$GLB,,, | Performed by: PHYSICIAN ASSISTANT

## 2020-07-08 PROCEDURE — 73030 XR SHOULDER COMPLETE 2 OR MORE VIEWS RIGHT: ICD-10-PCS | Mod: 26,RT,, | Performed by: RADIOLOGY

## 2020-07-08 PROCEDURE — 99999 PR PBB SHADOW E&M-EST. PATIENT-LVL IV: ICD-10-PCS | Mod: PBBFAC,,, | Performed by: PHYSICIAN ASSISTANT

## 2020-07-08 PROCEDURE — 3008F PR BODY MASS INDEX (BMI) DOCUMENTED: ICD-10-PCS | Mod: CPTII,S$GLB,, | Performed by: PHYSICIAN ASSISTANT

## 2020-07-08 PROCEDURE — 73030 X-RAY EXAM OF SHOULDER: CPT | Mod: TC,RT

## 2020-07-08 PROCEDURE — 73030 X-RAY EXAM OF SHOULDER: CPT | Mod: 26,RT,, | Performed by: RADIOLOGY

## 2020-07-08 PROCEDURE — 99214 OFFICE O/P EST MOD 30 MIN: CPT | Mod: S$GLB,,, | Performed by: PHYSICIAN ASSISTANT

## 2020-07-08 PROCEDURE — 99999 PR PBB SHADOW E&M-EST. PATIENT-LVL IV: CPT | Mod: PBBFAC,,, | Performed by: PHYSICIAN ASSISTANT

## 2020-07-08 NOTE — PROGRESS NOTES
Subjective:          Chief Complaint: Mela Blanco is a 34 y.o. female who had concerns including Pain of the Right Shoulder.    HPI   Patient who is a  presents to clinic with right shoulder pain x 3-4 months. Denies a specific MARCELO. Patient states that she only experiences pain when lying on her right shoulder and occasionally with reaching above her head and behind her. She has been taking Ibuprofen nightly prn pain without relief. Pain at rest is 0/10 and at its worst is 3/10. She has not been completing any home exercises. Pain is located along the posterior aspect of her right shoulder. Reports that her mother had shoulder calcific tendonitis, and she is wondering if she may have it as well.     Review of Systems   Constitution: Negative. Negative for chills, fever, weight gain and weight loss.   HENT: Negative for congestion and sore throat.    Eyes: Negative for blurred vision and double vision.   Cardiovascular: Negative for chest pain, leg swelling and palpitations.   Respiratory: Negative for cough and shortness of breath.    Hematologic/Lymphatic: Does not bruise/bleed easily.   Skin: Negative for itching, poor wound healing and rash.   Musculoskeletal: Positive for joint pain. Negative for back pain, joint swelling, muscle weakness, myalgias and stiffness.   Gastrointestinal: Negative for abdominal pain, constipation, diarrhea, nausea and vomiting.   Genitourinary: Negative.  Negative for frequency and hematuria.   Neurological: Negative for dizziness, headaches, numbness, paresthesias and sensory change.   Psychiatric/Behavioral: Negative for altered mental status and depression. The patient is not nervous/anxious.    Allergic/Immunologic: Negative for hives.                   Objective:        General: Mela is well-developed, well-nourished, appears stated age, in no acute distress, alert and oriented to time, place and person.     General    Vitals  reviewed.  Constitutional: She is oriented to person, place, and time. She appears well-developed and well-nourished. No distress.   HENT:   Head: Normocephalic.   Eyes: EOM are normal.   Neck: Normal range of motion.   Cardiovascular: Normal rate and regular rhythm.    Pulmonary/Chest: Effort normal. No respiratory distress.   Neurological: She is alert and oriented to person, place, and time. She has normal reflexes. No cranial nerve deficit. Coordination normal.   Psychiatric: She has a normal mood and affect. Her behavior is normal. Judgment and thought content normal.         Right Shoulder Exam     Inspection/Observation   Swelling: absent  Bruising: absent  Scars: absent  Deformity: absent  Scapular Winging: absent  Scapular Dyskinesia: negative  Atrophy: absent    Tenderness   The patient is experiencing no tenderness.    Range of Motion   Active abduction:  160 normal   Passive abduction:  160 normal   Extension:  50 normal   Forward Flexion:  180 normal   Adduction: 80 normal  External Rotation 0 degrees:  90 (pain) normal   Internal rotation 0 degrees:  T4 normal     Muscle Strength   The patient has normal right shoulder strength.    Tests & Signs   Apprehension: negative  Cross arm: positive  Drop arm: negative  Colon test: negative  Impingement: positive  Sulcus: absent  Rotator Cuff Painful Arc/Range: mild  Lift Off Sign: negative  Active Compression Test ([place]'s Sign): positive  Yergason's Test: negative  Speed's Test: negative  Anterior Drawer Test: 1+   Posterior Drawer Test: 1+    Other   Sensation: normal    Left Shoulder Exam     Inspection/Observation   Swelling: absent  Bruising: absent  Scars: absent  Deformity: absent  Scapular Winging: absent  Scapular Dyskinesia: negative  Atrophy: absent    Tenderness   The patient is experiencing no tenderness.     Range of Motion   Active abduction:  160 normal   Passive abduction:  160 normal   Extension:  50 normal   Forward Flexion:  180 normal    Adduction: 80 normal  External Rotation 0 degrees:  90 normal   Internal rotation 0 degrees:  T4 normal     Muscle Strength   The patient has normal left shoulder strength.    Tests & Signs   Apprehension: negative  Cross arm: negative  Drop arm: negative  Colon test: negative  Impingement: negative  Sulcus: absent  Lift Off Sign: negative  Active Compression test ([place]'s Sign): negative  Yergasons's Test: negative  Speed's Test: negative  Anterior Drawer Test: 1+  Posterior Drawer Test: 1+    Other   Sensation: normal       Muscle Strength   Right Upper Extremity   Shoulder Abduction: 5/5   Shoulder Internal Rotation: 5/5   Shoulder External Rotation: 5/5   Supraspinatus: 5/5/5   Subscapularis: 5/5/5   Biceps: 5/5/5   Left Upper Extremity  Shoulder Abduction: 5/5   Shoulder Internal Rotation: 5/5   Shoulder External Rotation: 5/5   Supraspinatus: 5/5/5   Subscapularis: 5/5/5   Biceps: 5/5/5     Reflexes     Left Side  Biceps:  2+  Triceps:  2+  Brachioradialis:  2+    Right Side   Biceps:  2+  Triceps:  2+  Brachioradialis:  2+    RADIOGRAPHS:  Right shoulder:  FINDINGS:  No acute fractures or dislocations.  No osteoblastic or lytic lesions.  There is no significant arthritic changes.  The visualized right ribs demonstrate no significant abnormalities.        Assessment:       Encounter Diagnoses   Name Primary?    Acute pain of right shoulder     Shoulder impingement, right Yes          Plan:       1. I made the decision to obtain old records of the patient including previous notes and imaging. New imaging was ordered today of the extremity or extremities evaluated. I independently reviewed and interpreted the radiographs today as well as prior imaging.  Reviewed radiographs with patient in detail.     2. NSAIDS prn pain    3. Discussed possible CSI. Patient is not interested in this today    4. Referral to formal PT for RC and periscapular strengthening program.    5. RTC prn pain. She will message me  via [facility]                    Patient questionnaires may have been collected.

## 2020-07-08 NOTE — LETTER
July 8, 2020      Agus Florez MD  [address]  [facility] [address]           [facility]  [address]  [facility] [address]  Phone: [phone]          Patient: Mela Blanco   MR Number: [id number]   YOB: 1985   Date of Visit: 7/8/2020       Dear Dr. Agus Florez:    Thank you for referring Mela Blanco to me for evaluation. Attached you will find relevant portions of my assessment and plan of care.    If you have questions, please do not hesitate to call me. I look forward to following Mela Blanco along with you.    Sincerely,    Leandra Patrick PA-C    Enclosure  CC:  No Recipients    If you would like to receive this communication electronically, please contact [email or web address] or [phone] to request more information on [employer] access.    For providers and/or their staff who would like to refer a patient to [facility], please contact us through our one-stop-shop provider referral line, [facility], at [phone].    If you feel you have received this communication in error or would no longer like to receive these types of communications, please e-mail [email or web address]

## 2020-07-20 ENCOUNTER — CLINICAL SUPPORT (OUTPATIENT)
Dept: REHABILITATION | Facility: HOSPITAL | Age: 35
End: 2020-07-20
Payer: COMMERCIAL

## 2020-07-20 ENCOUNTER — OFFICE VISIT (OUTPATIENT)
Dept: OPTOMETRY | Facility: CLINIC | Age: 35
End: 2020-07-20
Payer: COMMERCIAL

## 2020-07-20 DIAGNOSIS — M25.511 ACUTE PAIN OF RIGHT SHOULDER: ICD-10-CM

## 2020-07-20 DIAGNOSIS — Z46.0 FITTING AND ADJUSTMENT OF SPECTACLES AND CONTACT LENSES: Primary | ICD-10-CM

## 2020-07-20 DIAGNOSIS — H52.13 MYOPIA OF BOTH EYES WITH ASTIGMATISM: Primary | ICD-10-CM

## 2020-07-20 DIAGNOSIS — H52.203 MYOPIA OF BOTH EYES WITH ASTIGMATISM: Primary | ICD-10-CM

## 2020-07-20 DIAGNOSIS — M25.811 SHOULDER IMPINGEMENT, RIGHT: ICD-10-CM

## 2020-07-20 PROCEDURE — 99999 PR PBB SHADOW E&M-EST. PATIENT-LVL II: CPT | Mod: PBBFAC,,, | Performed by: OPTOMETRIST

## 2020-07-20 PROCEDURE — 99999 PR PBB SHADOW E&M-EST. PATIENT-LVL II: ICD-10-PCS | Mod: PBBFAC,,, | Performed by: OPTOMETRIST

## 2020-07-20 PROCEDURE — 92310 CONTACT LENS FITTING OU: CPT | Mod: CSM,S$GLB,, | Performed by: OPTOMETRIST

## 2020-07-20 PROCEDURE — 92012 INTRM OPH EXAM EST PATIENT: CPT | Mod: S$GLB,,, | Performed by: OPTOMETRIST

## 2020-07-20 PROCEDURE — 92015 DETERMINE REFRACTIVE STATE: CPT | Mod: S$GLB,,, | Performed by: OPTOMETRIST

## 2020-07-20 PROCEDURE — 92015 PR REFRACTION: ICD-10-PCS | Mod: S$GLB,,, | Performed by: OPTOMETRIST

## 2020-07-20 PROCEDURE — 97161 PT EVAL LOW COMPLEX 20 MIN: CPT

## 2020-07-20 PROCEDURE — 92310 PR CONTACT LENS FITTING (NO CHANGE): ICD-10-PCS | Mod: CSM,S$GLB,, | Performed by: OPTOMETRIST

## 2020-07-20 PROCEDURE — 92012 PR EYE EXAM, EST PATIENT,INTERMED: ICD-10-PCS | Mod: S$GLB,,, | Performed by: OPTOMETRIST

## 2020-07-20 PROCEDURE — 97110 THERAPEUTIC EXERCISES: CPT

## 2020-07-20 NOTE — PLAN OF CARE
[facility]  Physical Therapy Initial Evaluation    Name: Mela Blanco  Clinic Number: [id number]    Therapy Diagnosis:   Encounter Diagnoses   Name Primary?    Acute pain of right shoulder     Shoulder impingement, right      Physician: Leandra Patrick PA-C    Physician Orders: PT Eval and Treat  Medical Diagnosis from Referral:   M25.511 (ICD-10-CM) - Acute pain of right shoulder   M75.41 (ICD-10-CM) - Shoulder impingement, right       Evaluation Date: 7/20/2020  Authorization Period Expiration: 12/31/2020  Plan of Care Expiration: 12/31/2020  Visit # / Visits authorized: 1/ 20    Time In: 1005  Time Out: 1115  Total Billable Time: 40 minutes    Precautions: Standard    Subjective     Date of onset: 3-4 months ago  History of current condition - Mela reports: insidious onset R posterior shoulder pain. Reprots waking up one AM with pain in shoulder- is a R side sleeper, R hand dominant. The pt reports being a  and having to lift and carry objets but denies specific event at work that hurt her arm. The pt reports she also underwent carpal tunnel surgery a year and a half ago and had radiating pain from posterior shoulder to radial forearm but since surgery her pain went away. Denies having any current radiating symptoms or cervical pain.      Imaging none    Prior Therapy: none for shoulder   Exercise Routine/Sport Participation: none   Social History: Lives at home   Occupation:    Prior Level of Function: Unrestricted with OH/behind the back and reaching to the side R UE   Current Level of Function: restricted with reaching behind her back, out to the side, ER, reaching OH with heavy objects, throwing.     Pain:  Current 0/10, worst 4/10, best 0/10   Location: right posterior shoulder  Description: Aching  Aggravating Factors: see above   Easing Factors: rest    Pts goals: prevent worsening of sx and improve function       Medical History:    Past Medical History:   Diagnosis Date    Bariatric surgery status 5/25/2015    BMI 32.0-32.9,adult 11/27/2019    Current use of proton pump inhibitor 11/27/2019    IUD (intrauterine device) in place 11/27/2019    Miscarriage     NSAID induced gastritis 3/7/2018    Perennial allergic rhinitis 11/27/2019    Vitamin D insufficiency 11/27/2019       Surgical History:   Mela Blanco  has a past surgical history that includes Laparoscopic gastric banding; breast reduction; hematoma surgery; Carpal tunnel release (Right, 01/12/2018); Cosmetic surgery; and Carpal tunnel release (Left, 10/19/2018).    Medications:   Mela has a current medication list which includes the following prescription(s): caffeine, cetirizine, cholecalciferol (vitamin d3), fluticasone propionate, ketoconazole, levonorgestrel, mupirocin, omeprazole, and phentermine.    Allergies:   Review of patient's allergies indicates:  No Known Allergies     Objective     Posture: flat thoracic spine, upwardly rotated scaps B       Shoulder Elevation: humeral IR       Shoulder Active Range of Motion:   Shoulder Right Left   Flexion   180 180   ER at 0   80 *passive 90   Behind the back Reach   T6 T6   Scapula Upward Rotation 50 50      Shoulder Passive Range of Motion:   Shoulder Right Left   Flexion   180 180   ER at 0   90 90   ER at 90   100 100   IR   90 90     Cervical Range of Motion:    % Observation Pain   Flexion 100 Inc OA ext  N     Extension 75%  Hinge lower cervical  N     Right Rotation 100 na n     Left Rotation 100 na n     Right Sidebend 75 na n     Left Sidebend 75 na n       Seated Thoracolumbar Range of Motion:    % Observation Pain   Flexion 75 na n   Extension 50 Fair lower thoracic ext  n   Right Rotation 50 n n   Left Rotation 50 na n   Right Sidebend 100 na n   Left Sidebend 100 na n       Strength:   Right Left   Scaption 4/5 4+/5   Shoulder ER at side 3/5 4/5   Post Deoltoid 4+/5 4+/5   Shoulder IR at side   4+/5 4+/5   Deltoid 5/5 5/5   Scapula Upward rotation force couple- UT/LT/SA (determined due to inability to reach full ROM) 5/5 5/5         Special Tests:   Right Left   Colon- Abhishek - -   Neer's - -   Full Can - -   Posterior/Internal Impingement Sign - -   Supine Impingement  - -      Right Left   Load & Shift - -   Sulcus Sign - -   Apprehension Test - -   Relocation Test  - -      Right Left   Drop Arm Test - -   ER Lag Sign - -   Bear Hug + -   Belly Press  - -      Right Left   Active Compression - -   Cross Body Adduction + -       Joint Mobility: mild dec in postGHJ mobility R     Palpation: ttp at post cuff     Flexibility:   Post Cuff: R + ; L -   Lat: R - ; L -   Pec Minor: R - ; L -          CMS Impairment/Limitation/Restriction for FOTO Shoulder Survey    Therapist reviewed FOTO scores for Mela Blanco on 7/20/2020.   FOTO documents entered into [employer] - see Media section.    Limitation Score: see media %  Category: Mobility       Treatment     Treatment Time In: 1030  Treatment Time Out: 1040  Total Treatment time separate from Evaluation: 10 minutes    Mela received therapeutic exercises to develop strength and flexibility for 10 minutes including:  Sidelying thoracic rotations 10x B   Iso holds ot 45s B 1x ea     To add next time:   UBE completed for 3/3 min forward & backward to increase ROM, endurance and decrease pain to improve tolerance to ADLs and age related activities.   High Rows  Behind the back wall slide IR focus   Sidelying ER   ABcs on wall       Home Exercises and Patient Education Provided     Education provided:   - General exercise to A with pain and overall strength   - Prognosis, activity modification, goals for therapy, role of therapy for care, exercises/HEP    Written Home Exercises Provided: yes.  Exercises were reviewed and Mela was able to demonstrate them prior to the end of the session.   Pt received a written copy of exercises to perform at home.  Mela demonstrated good  understanding of the education provided.     See EMR under patient instructions for exercises given.     Assessment     Mela is a 34 y.o. female referred to outpatient Physical Therapy with R shoulder pain of insidious onset 3 months ago. The pt demonstrates sig deficits and pain with post cuff strength and mobility with no underlying mechanical dysfunction. The pt therex will focus on improving tendon function, decreasing pain and increasing overall strength of R shoulder.       Pt will benefit from skilled outpatient Physical Therapy to address the deficits stated above and in the chart below, provide pt/family education, and to maximize pt's level of independence. Pt prognosis is Good.     Plan of care discussed with patient: Yes  Pt's spiritual, cultural and educational needs considered and patient is agreeable to the plan of care and goals as stated below:       Anticipated Barriers for therapy: none      Medical Necessity is demonstrated by the following  History  Co-morbidities and personal factors that may impact the plan of care Co-morbidities:   high BMI    Personal Factors:   age  lifestyle     low   Examination  Body Structures and Functions, activity limitations and participation restrictions that may impact the plan of care Body Regions:   upper extremities  trunk    Body Systems:    gross symmetry  ROM  strength  motor control    Participation Restrictions:   none    Activity limitations:   Learning and applying knowledge  No deficit    General Tasks and Commands  No deficit    Communication  No deficit    Mobility  lifting and carrying objects  fine hand use (grasping/picking up)    Self care  Dressing and grooming     Domestic Life  Cleaning     Interactions/Relationships  No deficit    Life Areas  recreational activities     Community and Social Life  No deficit          low   Clinical Presentation stable and uncomplicated low   Decision Making/ Complexity Score:  low     Goals:  Short Term Goals: 2-4 weeks  1. Pt will be compliant with HEP 50% of prescribed amount.   2. The pt to demo improvement in R shoulder ER strength by 1/3 MMT grade without pain   3.  The pt to demo full AROM shoulder ER without pain R    Long Term Goals: 20 weeks   1. Pt will be compliant with % of prescribed amount.   2. The pt to report being able to unhook and hook her bra behind her back without pain in her R shoulder   3. The tp to report being able to reach behind her when during with R hand per pt stated goals.     4. The pt will report full participation in ADLs and IADLs without restrictions related to R shoulder .     Plan   Plan of care Certification: 7/20/2020 to 12/31/2020.    Outpatient Physical Therapy 2 times weekly for 20 weeks to include the following interventions: Moist Heat/ Ice, Neuromuscular Re-ed, Patient Education, Therapeutic Activites and Therapeutic Exercise.     Anne Marquez, PT , DPT, SCS, FAAMOMPT

## 2020-07-20 NOTE — PROGRESS NOTES
HPI     DLS 05/28/2018  Patient here for Annual CL exam.  Patient wears Acuvue Galion 12 hrs daily. Experience some discomfort and   dryness in OU.  Would like new CL/Glasses.    Last edited by Abhilash Ferrell on 7/20/2020  1:15 PM. (History)            Assessment /Plan     For exam results, see Encounter Report.    Myopia of both eyes with astigmatism      1. New Spec Rx given and Contact lens Rx released to pt. Daily wear only advised, with education to risks of extended wear.  Discussed lens care, compliance and solutions. RTC yearly contact lens follow up.    Different lens options discussed with patient. RTC 1 year full exam.

## 2020-08-05 ENCOUNTER — CLINICAL SUPPORT (OUTPATIENT)
Dept: REHABILITATION | Facility: HOSPITAL | Age: 35
End: 2020-08-05
Payer: COMMERCIAL

## 2020-08-05 DIAGNOSIS — G89.29 CHRONIC RIGHT SHOULDER PAIN: ICD-10-CM

## 2020-08-05 DIAGNOSIS — M25.511 CHRONIC RIGHT SHOULDER PAIN: ICD-10-CM

## 2020-08-05 PROCEDURE — 97110 THERAPEUTIC EXERCISES: CPT

## 2020-08-05 PROCEDURE — 97140 MANUAL THERAPY 1/> REGIONS: CPT

## 2020-08-05 NOTE — PROGRESS NOTES
Physical Therapy Daily Treatment Note     Name: Mela Blanco  Clinic Number: [id number]    Therapy Diagnosis: No diagnosis found.  Physician: Leandra Patrick PA-C    Visit Date: 8/5/2020  Physician Orders: PT Eval and Treat  Medical Diagnosis from Referral:   M25.511 (ICD-10-CM) - Acute pain of right shoulder   M75.41 (ICD-10-CM) - Shoulder impingement, right         Evaluation Date: 7/20/2020  Authorization Period Expiration: 12/31/2020  Plan of Care Expiration: 12/31/2020  Visit # / Visits authorized: 1/ 20     Time In: 1000  Time Out: 1030  Total Billable Time: 30 minutes     Precautions: Standard      Subjective     Pt reports: her shoulder feels the same, still stiff in the AM causing dec ROM until she warms it up. Pt arrived 15 min late to her appointment.     She was compliant with home exercise program.  Response to previous treatment: mild improvement in pain   Functional change: na     Pain: 2/10  Location: R post shoulder      Objective     Daily Measurements: Shoulder Flexion pain free. Shoulder ER pain free at side pain with resistance. Pain with high ER       Daily Treatment       Mela received therapeutic exercises to develop strength and ROM for 15 minutes including:  Isometric ER holds gtb 4x45s   High IR tempo ytb 5x5   Behind the back MET on wall 20x 5s hold   Sidelying ER tempo 0#     Mela received the following manual therapy techniques: Joint mobilizations and Soft tissue Mobilization were applied to the: R shoulder for 15 minutes, including:  Post GHJ mobs grade III-IV   MET shoulder ER/IR high   Sidelying post cuff stripping   MET horizontal abd       Home Exercises and Patient Education Provided     Education provided:   - Adding in new exercises     Written Home Exercises Provided: yes.  Exercises were reviewed and Mela was able to demonstrate them prior to the end of the session.  Mela demonstrated good  understanding of the education provided.     See  EMR under patient instructions for exercises given.     Assessment     The pt demonstrates post RTC irritation with ER passive and active and so therex focused on improving GHJ PICR with OKC movements. The pt with good tolerance to all therex with only mild increase in shoulder soreness following mveoments.     Mela is progressing well towards her goals.     Pt will continue to benefit from skilled outpatient physical therapy to address the deficits listed in the problem list box on initial evaluation, provide pt/family education and to maximize pt's level of independence in the home and community environment. Pt prognosis is Good.     Pt's spiritual, cultural and educational needs considered and pt agreeable to plan of care and goals.    Anticipated barriers to physical therapy: None    Goals:  Short Term Goals: 2-4 weeks  1. Pt will be compliant with HEP 50% of prescribed amount.   2. The pt to demo improvement in R shoulder ER strength by 1/3 MMT grade without pain   3.  The pt to demo full AROM shoulder ER without pain R     Long Term Goals: 20 weeks   1. Pt will be compliant with % of prescribed amount.   2. The pt to report being able to unhook and hook her bra behind her back without pain in her R shoulder   3. The tp to report being able to reach behind her when during with R hand per pt stated goals.      4. The pt will report full participation in ADLs and IADLs without restrictions related to R shoulder .     Plan     Focus on strength and activity tolerance.     Anne Marquez, PT , DPT, SCS, FAAOMPT

## 2020-08-12 ENCOUNTER — CLINICAL SUPPORT (OUTPATIENT)
Dept: REHABILITATION | Facility: HOSPITAL | Age: 35
End: 2020-08-12
Payer: COMMERCIAL

## 2020-08-12 DIAGNOSIS — G89.29 CHRONIC RIGHT SHOULDER PAIN: ICD-10-CM

## 2020-08-12 DIAGNOSIS — M25.511 CHRONIC RIGHT SHOULDER PAIN: ICD-10-CM

## 2020-08-12 PROCEDURE — 97110 THERAPEUTIC EXERCISES: CPT

## 2020-08-12 PROCEDURE — 97112 NEUROMUSCULAR REEDUCATION: CPT

## 2020-08-12 NOTE — PROGRESS NOTES
Physical Therapy Daily Treatment Note     Name: Mela Lagos Darby  Clinic Number: [id number]    Therapy Diagnosis:   Encounter Diagnosis   Name Primary?    Chronic right shoulder pain      Physician: Leandra Patrick PA-C    Visit Date: 8/12/2020  Physician Orders: PT Eval and Treat  Medical Diagnosis from Referral:   M25.511 (ICD-10-CM) - Acute pain of right shoulder   M75.41 (ICD-10-CM) - Shoulder impingement, right         Evaluation Date: 7/20/2020  Authorization Period Expiration: 12/31/2020  Plan of Care Expiration: 12/31/2020  Visit # / Visits authorized: 3/ 20     Time In: 0955  Time Out: 1030  Total Billable Time: 30 minutes     Precautions: Standard      Subjective     Pt reports: Pt arrived 10 m late to her appointment. Reports since not sleeping on her shoulder and using heat and being off work for a week, her shoulder feels fine- the pt is reluctant to report that she has improved.     She was compliant with home exercise program.  Response to previous treatment: mild improvement in pain   Functional change: na     Pain: 2/10  Location: R post shoulder      Objective     Daily Measurements: Pain free shoulder AROM, pain free resisted ER       Daily Treatment       Mela received therapeutic exercises to develop strength and ROM for 15 minutes including:    Thoracic rotation sidelying 15x B   CTJUNX extension scoot backs 15x 2s holds   Behind the back MET on wall 20x 5s hold     Mela participated in neuromuscular re-education activities to improve: Posture and motor control for 15 minutes. The following activities were included:  Prone Rows 3x10    Sidelying ER 2# 5x5   High IR tempo ytb 5x5       Home Exercises and Patient Education Provided     Education provided:   - Adding in new exercises     Written Home Exercises Provided: yes.  Exercises were reviewed and Mela was able to demonstrate them prior to the end of the session.  Mela demonstrated good  understanding of the  education provided.     See EMR under patient instructions for exercises given.     Assessment     The patient educated on the importance of lifestyle changes to improve her overall shoulder health and longevity of her pain free movements. The pt was able to complete all exercise today pain free which is an improvement form IE. The pt advised to try to sleep in her bed with pillows to block her rolling to her side. The pt advised to start completing her exercises every day.   Mela is progressing well towards her goals.     Pt will continue to benefit from skilled outpatient physical therapy to address the deficits listed in the problem list box on initial evaluation, provide pt/family education and to maximize pt's level of independence in the home and community environment. Pt prognosis is Good.     Pt's spiritual, cultural and educational needs considered and pt agreeable to plan of care and goals.    Anticipated barriers to physical therapy: None    Goals:  Short Term Goals: 2-4 weeks  1. Pt will be compliant with HEP 50% of prescribed amount.   2. The pt to demo improvement in R shoulder ER strength by 1/3 MMT grade without pain   3.  The pt to demo full AROM shoulder ER without pain R     Long Term Goals: 20 weeks   1. Pt will be compliant with % of prescribed amount.   2. The pt to report being able to unhook and hook her bra behind her back without pain in her R shoulder   3. The tp to report being able to reach behind her when during with R hand per pt stated goals.      4. The pt will report full participation in ADLs and IADLs without restrictions related to R shoulder .     Plan     Focus on strength and activity tolerance.     Anne Marquez, PT , DPT, SCS, FAAOMPT

## 2020-09-23 DIAGNOSIS — Z98.84 BARIATRIC SURGERY STATUS: ICD-10-CM

## 2020-09-23 RX ORDER — PHENTERMINE HYDROCHLORIDE 37.5 MG/1
37.5 TABLET ORAL
Qty: 30 TABLET | Refills: 5 | Status: SHIPPED | OUTPATIENT
Start: 2020-09-23 | End: 2021-04-10

## 2020-10-20 ENCOUNTER — PATIENT OUTREACH (OUTPATIENT)
Dept: ADMINISTRATIVE | Facility: OTHER | Age: 35
End: 2020-10-20

## 2020-10-21 ENCOUNTER — OFFICE VISIT (OUTPATIENT)
Dept: SPORTS MEDICINE | Facility: CLINIC | Age: 35
End: 2020-10-21
Payer: COMMERCIAL

## 2020-10-21 ENCOUNTER — HOSPITAL ENCOUNTER (OUTPATIENT)
Dept: RADIOLOGY | Facility: HOSPITAL | Age: 35
Discharge: HOME | End: 2020-10-21
Attending: PHYSICIAN ASSISTANT
Payer: COMMERCIAL

## 2020-10-21 VITALS
SYSTOLIC BLOOD PRESSURE: 137 MMHG | WEIGHT: 211 LBS | HEIGHT: 65 IN | DIASTOLIC BLOOD PRESSURE: 84 MMHG | BODY MASS INDEX: 35.16 KG/M2 | HEART RATE: 96 BPM

## 2020-10-21 DIAGNOSIS — M25.562 PAIN IN BOTH KNEES, UNSPECIFIED CHRONICITY: ICD-10-CM

## 2020-10-21 DIAGNOSIS — G89.29 CHRONIC PAIN OF RIGHT KNEE: ICD-10-CM

## 2020-10-21 DIAGNOSIS — M25.811 SHOULDER IMPINGEMENT, RIGHT: Primary | ICD-10-CM

## 2020-10-21 DIAGNOSIS — M23.91 INTERNAL DERANGEMENT OF RIGHT KNEE: ICD-10-CM

## 2020-10-21 DIAGNOSIS — M25.561 CHRONIC PAIN OF RIGHT KNEE: ICD-10-CM

## 2020-10-21 DIAGNOSIS — M25.561 PAIN IN BOTH KNEES, UNSPECIFIED CHRONICITY: ICD-10-CM

## 2020-10-21 DIAGNOSIS — M25.511 ACUTE PAIN OF RIGHT SHOULDER: ICD-10-CM

## 2020-10-21 PROCEDURE — 99214 OFFICE O/P EST MOD 30 MIN: CPT | Mod: 25,S$GLB,, | Performed by: PHYSICIAN ASSISTANT

## 2020-10-21 PROCEDURE — 99999 PR PBB SHADOW E&M-EST. PATIENT-LVL IV: CPT | Mod: PBBFAC,,, | Performed by: PHYSICIAN ASSISTANT

## 2020-10-21 PROCEDURE — 99214 PR OFFICE/OUTPT VISIT, EST, LEVL IV, 30-39 MIN: ICD-10-PCS | Mod: 25,S$GLB,, | Performed by: PHYSICIAN ASSISTANT

## 2020-10-21 PROCEDURE — 3008F BODY MASS INDEX DOCD: CPT | Mod: CPTII,S$GLB,, | Performed by: PHYSICIAN ASSISTANT

## 2020-10-21 PROCEDURE — 3008F PR BODY MASS INDEX (BMI) DOCUMENTED: ICD-10-PCS | Mod: CPTII,S$GLB,, | Performed by: PHYSICIAN ASSISTANT

## 2020-10-21 PROCEDURE — 20610 DRAIN/INJ JOINT/BURSA W/O US: CPT | Mod: RT,S$GLB,, | Performed by: PHYSICIAN ASSISTANT

## 2020-10-21 PROCEDURE — 20610 PR DRAIN/INJECT LARGE JOINT/BURSA: ICD-10-PCS | Mod: RT,S$GLB,, | Performed by: PHYSICIAN ASSISTANT

## 2020-10-21 PROCEDURE — 99999 PR PBB SHADOW E&M-EST. PATIENT-LVL IV: ICD-10-PCS | Mod: PBBFAC,,, | Performed by: PHYSICIAN ASSISTANT

## 2020-10-21 RX ORDER — BUPIVACAINE HYDROCHLORIDE 2.5 MG/ML
3 INJECTION, SOLUTION INFILTRATION; PERINEURAL
Status: COMPLETED | OUTPATIENT
Start: 2020-10-21 | End: 2020-10-21

## 2020-10-21 RX ORDER — TRIAMCINOLONE ACETONIDE 40 MG/ML
40 INJECTION, SUSPENSION INTRA-ARTICULAR; INTRAMUSCULAR
Status: COMPLETED | OUTPATIENT
Start: 2020-10-21 | End: 2020-10-21

## 2020-10-21 RX ADMIN — BUPIVACAINE HYDROCHLORIDE 7.5 MG: 2.5 INJECTION, SOLUTION INFILTRATION; PERINEURAL at 12:10

## 2020-10-21 RX ADMIN — TRIAMCINOLONE ACETONIDE 40 MG: 40 INJECTION, SUSPENSION INTRA-ARTICULAR; INTRAMUSCULAR at 12:10

## 2020-10-21 NOTE — PROGRESS NOTES
Subjective:          Chief Complaint: Mela Blanco is a 34 y.o. female who had concerns including Pain of the Right Knee and Pain of the Right Shoulder.      Pain  Pertinent negatives include no abdominal pain, chest pain, chills, congestion, coughing, fever, headaches, joint swelling, myalgias, nausea, numbness, rash, sore throat or vomiting.      Patient who is a  presents to clinic with right shoulder pain x 6-7 months. Denies a specific MARCELO. Patient states that she only experiences pain when lying on her right shoulder and occasionally with reaching above her head and behind her. She has been taking Ibuprofen/tylenol nightly prn pain without relief. Pain at rest is 0/10 and at its worst is 7/10. She has completed 6 weeks of formal PT with some improvement of pain. Pain is located along the posterior aspect of her right shoulder.    She is here today for follow up of right knee pain x 4-5 years. Pain is worse with walking, standing, kneeling, stairs, and increased activity. Pain is better with rest. She has been experiencing swelling in her right knee towards the end of the day. Pain is located in the posterior knee and above patella. She reports increased crepitus in her knees. Pain at rest is 0/10. Pain with any activity is 4-5/10.    -mechanical symptoms,  - instability.     She received a right knee Synvisc one injection in her knee on 10/23/19 but received no benefits from the injection. She is requesting a right knee and right shoulder CSI today.     She received a left knee CSI on 11/6/2019 with relief of pain in left knee and a right knee steroid injection on 12/12/19.        Review of Systems   Constitution: Negative. Negative for chills, fever, weight gain and weight loss.   HENT: Negative for congestion and sore throat.    Eyes: Negative for blurred vision and double vision.   Cardiovascular: Negative for chest pain, leg swelling and palpitations.   Respiratory: Negative for  cough and shortness of breath.    Hematologic/Lymphatic: Does not bruise/bleed easily.   Skin: Negative for itching, poor wound healing and rash.   Musculoskeletal: Positive for joint pain. Negative for back pain, joint swelling, muscle weakness, myalgias and stiffness.   Gastrointestinal: Negative for abdominal pain, constipation, diarrhea, nausea and vomiting.   Genitourinary: Negative.  Negative for frequency and hematuria.   Neurological: Negative for dizziness, headaches, numbness, paresthesias and sensory change.   Psychiatric/Behavioral: Negative for altered mental status and depression. The patient is not nervous/anxious.    Allergic/Immunologic: Negative for hives.                   Objective:        General: Mela is well-developed, well-nourished, appears stated age, in no acute distress, alert and oriented to time, place and person.     General    Vitals reviewed.  Constitutional: She is oriented to person, place, and time. She appears well-developed and well-nourished. No distress.   HENT:   Head: Normocephalic and atraumatic.   Eyes: EOM are normal.   Neck: Normal range of motion.   Cardiovascular: Normal rate and regular rhythm.    Pulmonary/Chest: Effort normal. No respiratory distress.   Neurological: She is alert and oriented to person, place, and time. She has normal reflexes. No cranial nerve deficit. Coordination normal.   Psychiatric: She has a normal mood and affect. Her behavior is normal. Judgment and thought content normal.     General Musculoskeletal Exam   Gait: normal       Right Knee Exam     Inspection   Erythema: absent  Scars: absent  Swelling: absent  Effusion: absent  Deformity: absent  Bruising: absent    Tenderness   The patient is tender to palpation of the patella.    Range of Motion   Extension:  0 normal   Flexion:  130 abnormal     Tests   Meniscus   Tracy:  Medial - negative Lateral - positive  Ligament Examination Lachman: normal (-1 to 2mm) PCL-Posterior Drawer:  normal (0 to 2mm)     MCL - Valgus: normal (0 to 2mm)  LCL - Varus: normalPivot Shift: normal (Equal)Reverse Pivot Shift: normal (Equal)  Posterolateral Corner: unstable (>15 degrees difference)  Patella   Passive Patellar Tilt: neutral  Patellar Glide (quadrants): Lateral - 1   Medial - 2  Patellar Grind: positive    Other   Sensation: normal    Left Knee Exam     Inspection   Erythema: absent  Scars: absent  Swelling: absent  Effusion: absent  Deformity: absent  Bruising: absent    Tenderness   The patient is experiencing no tenderness.     Crepitus   The patient has crepitus of the patella.    Range of Motion   Extension:  0 normal   Flexion:  140 normal     Tests   Meniscus   Tracy:  Medial - negative Lateral - negative  Stability Lachman: normal (-1 to 2mm) PCL-Posterior Drawer: normal (0 to 2mm)  MCL - Valgus: normal (0 to 2mm)  LCL - Varus: normal (0 to 2mm)Pivot Shift: normal (Equal)Reverse Pivot Shift: normal (Equal)  Posterolateral Corner: unstable (>15 degrees difference)  Patella   Passive Patellar Tilt: neutral  Patellar Glide (Quadrants): Lateral - 1 Medial - 2  Patellar Grind: negative    Other   Sensation: normalRight Shoulder Exam     Inspection/Observation   Swelling: absent  Bruising: absent  Scars: absent  Deformity: absent  Scapular Winging: absent  Scapular Dyskinesia: negative  Atrophy: absent    Tenderness   The patient is experiencing no tenderness.    Range of Motion   Active abduction:  160 normal   Passive abduction:  160 normal   Extension:  50 normal   Forward Flexion:  180 normal   Adduction: 80 normal  External Rotation 0 degrees:  90 normal   Internal rotation 0 degrees:  T4 normal     Muscle Strength   The patient has normal right shoulder strength.    Tests & Signs   Apprehension: negative  Cross arm: positive  Drop arm: negative  Colon test: positive  Impingement: positive  Sulcus: absent  Rotator Cuff Painful Arc/Range: mild  Lift Off Sign: negative  Active Compression Test  (Bartelso's Sign): positive  Yergason's Test: negative  Speed's Test: negative  Anterior Drawer Test: 1+   Posterior Drawer Test: 1+    Other   Sensation: normal    Left Shoulder Exam     Inspection/Observation   Swelling: absent  Bruising: absent  Scars: absent  Deformity: absent  Scapular Winging: absent  Scapular Dyskinesia: negative  Atrophy: absent    Tenderness   The patient is experiencing no tenderness.     Range of Motion   Active abduction:  160 normal   Passive abduction:  160 normal   Extension:  50 normal   Forward Flexion:  180 normal   Adduction: 80 normal  External Rotation 0 degrees:  90 normal   Internal rotation 0 degrees:  T4 normal     Muscle Strength   The patient has normal left shoulder strength.    Tests & Signs   Apprehension: negative  Cross arm: negative  Drop arm: negative  Colon test: negative  Impingement: negative  Sulcus: absent  Lift Off Sign: negative  Active Compression test (Bartelso's Sign): negative  Yergasons's Test: negative  Speed's Test: negative  Anterior Drawer Test: 1+  Posterior Drawer Test: 1+    Other   Sensation: normal       Muscle Strength   Right Upper Extremity   Shoulder Abduction: 5/5   Shoulder Internal Rotation: 5/5   Shoulder External Rotation: 5/5   Supraspinatus: 5/5   Subscapularis: 5/5   Biceps: 5/5   Left Upper Extremity  Shoulder Abduction: 5/5   Shoulder Internal Rotation: 5/5   Shoulder External Rotation: 5/5   Supraspinatus: 5/5   Subscapularis: 5/5   Biceps: 5/5   Right Lower Extremity   Hip Abduction: 5/5   Quadriceps:  5/5   Hamstrin/5   Left Lower Extremity   Hip Abduction: 5/5   Quadriceps:  5/5   Hamstrin/5     Reflexes     Left Side  Biceps:  2+  Triceps:  2+  Brachioradialis:  2+  Achilles:  2+  Quadriceps:  2+    Right Side   Biceps:  2+  Triceps:  2+  Brachioradialis:  2+  Achilles:  2+  Quadriceps:  2+    Vascular Exam     Right Pulses  Dorsalis Pedis:      2+  Posterior Tibial:      2+        Left Pulses  Dorsalis Pedis:       2+  Posterior Tibial:      2+          RADIOGRAPHS:  Right shoulder:  FINDINGS:  No acute fractures or dislocations.  No osteoblastic or lytic lesions.  There is no significant arthritic changes.  The visualized right ribs demonstrate no significant abnormalities.        Assessment:       Encounter Diagnoses   Name Primary?    Shoulder impingement, right Yes    Chronic pain of right knee     Internal derangement of right knee     Acute pain of right shoulder           Plan:       1. Injection Procedure right shoulder  A time out was performed, including verification of patient ID, procedure, site and side, availability of information and equipment, review of safety issues, and agreement with consent, the procedure site was marked.    After time out was performed, the patient was prepped aseptically with povidone-iodine swabsticks. A diagnostic and therapeutic injection of 1:3cc Kenalog/Marcaine was given under sterile technique using a 22g x 1.5 needle from the Posterior  aspect of the right Subacromial in the sitting position.      Mela Blanco had no adverse reactions to the medication. Pain decreased. She was instructed to apply ice to the joint for 20 minutes and avoid strenuous activities for 24-36 hours following the injection. She was warned of possible blood sugar and/or blood pressure changes during that time. Following that time, she can resume regular activities.    She was reminded to call the clinic immediately for any adverse side effects as explained in clinic today.    2. Injection Procedure right knee  A time out was performed, including verification of patient ID, procedure, site and side, availability of information and equipment, review of safety issues, and agreement with consent, the procedure site was marked.    After time out was performed, the patient was prepped aseptically with povidone-iodine swabsticks. A diagnostic and therapeutic injection of 1:3cc Kenalog/Marcaine was  given under sterile technique using a 22g x 1.5 needle from the Superolateral  aspect of the right Knee Joint in the supine position.      Mela Blanco had no adverse reactions to the medication. Pain decreased. She was instructed to apply ice to the joint for 20 minutes and avoid strenuous activities for 24-36 hours following the injection. She was warned of possible blood sugar and/or blood pressure changes during that time. Following that time, she can resume regular activities.    She was reminded to call the clinic immediately for any adverse side effects as explained in clinic today.    3. NSAIDS prn pain    4. Continue RC , periscapular, CORE, and patellofemoral strengthening program    5. If continued pain in her right shoulder in 2 weeks, recommend MRI of right shoulder.     6. Recommend appointment with Dr. Gutierrez for evaluation and discussion of treatment options of right knee     7. She will keep me posted via [facility] on her symptoms.                    Patient questionnaires may have been collected.

## 2020-11-06 ENCOUNTER — PATIENT MESSAGE (OUTPATIENT)
Dept: SPORTS MEDICINE | Facility: CLINIC | Age: 35
End: 2020-11-06

## 2020-11-16 ENCOUNTER — TELEPHONE (OUTPATIENT)
Dept: SPORTS MEDICINE | Facility: CLINIC | Age: 35
End: 2020-11-16

## 2020-11-16 NOTE — TELEPHONE ENCOUNTER
Left v/m that FMLA form has been completed by Leandra Patrick PA-C. Form is at the  for her to .    Janna Gurrola MA  [facility]

## 2020-12-05 DIAGNOSIS — Z98.84 BARIATRIC SURGERY STATUS: ICD-10-CM

## 2020-12-05 DIAGNOSIS — T39.395A NSAID INDUCED GASTRITIS: ICD-10-CM

## 2020-12-05 DIAGNOSIS — K29.60 NSAID INDUCED GASTRITIS: ICD-10-CM

## 2020-12-05 DIAGNOSIS — J30.89 PERENNIAL ALLERGIC RHINITIS: ICD-10-CM

## 2020-12-05 RX ORDER — FLUTICASONE PROPIONATE 50 MCG
2 SPRAY, SUSPENSION (ML) NASAL 2 TIMES DAILY
Qty: 16 G | Refills: 12 | Status: SHIPPED | OUTPATIENT
Start: 2020-12-05 | End: 2021-04-10 | Stop reason: SDUPTHER

## 2020-12-05 RX ORDER — CETIRIZINE HYDROCHLORIDE 10 MG/1
10 TABLET ORAL DAILY PRN
Qty: 90 TABLET | Refills: 3 | Status: SHIPPED | OUTPATIENT
Start: 2020-12-05 | End: 2021-03-02 | Stop reason: SDUPTHER

## 2020-12-05 RX ORDER — ACETAMINOPHEN 500 MG
1 TABLET ORAL DAILY
Qty: 90 CAPSULE | Refills: 3 | Status: SHIPPED | OUTPATIENT
Start: 2020-12-05 | End: 2021-04-10 | Stop reason: SDUPTHER

## 2020-12-05 RX ORDER — OMEPRAZOLE 40 MG/1
40 CAPSULE, DELAYED RELEASE ORAL DAILY
Qty: 90 CAPSULE | Refills: 4 | Status: SHIPPED | OUTPATIENT
Start: 2020-12-05 | End: 2021-04-10 | Stop reason: SDUPTHER

## 2021-01-06 ENCOUNTER — HOSPITAL ENCOUNTER (OUTPATIENT)
Dept: RADIOLOGY | Facility: HOSPITAL | Age: 36
Discharge: HOME | End: 2021-01-06
Attending: ORTHOPAEDIC SURGERY
Payer: COMMERCIAL

## 2021-01-06 ENCOUNTER — OFFICE VISIT (OUTPATIENT)
Dept: SPORTS MEDICINE | Facility: CLINIC | Age: 36
End: 2021-01-06
Payer: COMMERCIAL

## 2021-01-06 VITALS
WEIGHT: 211 LBS | DIASTOLIC BLOOD PRESSURE: 86 MMHG | HEART RATE: 95 BPM | HEIGHT: 65 IN | BODY MASS INDEX: 35.16 KG/M2 | SYSTOLIC BLOOD PRESSURE: 134 MMHG

## 2021-01-06 DIAGNOSIS — M25.561 PAIN IN BOTH KNEES, UNSPECIFIED CHRONICITY: ICD-10-CM

## 2021-01-06 DIAGNOSIS — M25.562 PAIN IN BOTH KNEES, UNSPECIFIED CHRONICITY: Primary | ICD-10-CM

## 2021-01-06 DIAGNOSIS — M25.511 ACUTE PAIN OF RIGHT SHOULDER: ICD-10-CM

## 2021-01-06 DIAGNOSIS — M25.561 PAIN IN BOTH KNEES, UNSPECIFIED CHRONICITY: Primary | ICD-10-CM

## 2021-01-06 DIAGNOSIS — M22.2X2 PATELLOFEMORAL ARTHRALGIA OF BOTH KNEES: ICD-10-CM

## 2021-01-06 DIAGNOSIS — Z98.84 BARIATRIC SURGERY STATUS: ICD-10-CM

## 2021-01-06 DIAGNOSIS — G89.29 CHRONIC RIGHT SHOULDER PAIN: ICD-10-CM

## 2021-01-06 DIAGNOSIS — M25.511 CHRONIC RIGHT SHOULDER PAIN: ICD-10-CM

## 2021-01-06 DIAGNOSIS — M25.562 PAIN IN BOTH KNEES, UNSPECIFIED CHRONICITY: ICD-10-CM

## 2021-01-06 DIAGNOSIS — M22.2X1 PATELLOFEMORAL ARTHRALGIA OF BOTH KNEES: ICD-10-CM

## 2021-01-06 DIAGNOSIS — M25.461 EFFUSION OF RIGHT KNEE: ICD-10-CM

## 2021-01-06 PROCEDURE — 73564 X-RAY EXAM KNEE 4 OR MORE: CPT | Mod: 26,,, | Performed by: RADIOLOGY

## 2021-01-06 PROCEDURE — 97110 PR THERAPEUTIC EXERCISES: ICD-10-PCS | Mod: GP,S$GLB,, | Performed by: ORTHOPAEDIC SURGERY

## 2021-01-06 PROCEDURE — 73564 XR KNEE ORTHO BILAT WITH FLEXION: ICD-10-PCS | Mod: 26,,, | Performed by: RADIOLOGY

## 2021-01-06 PROCEDURE — 99214 PR OFFICE/OUTPT VISIT, EST, LEVL IV, 30-39 MIN: ICD-10-PCS | Mod: S$GLB,,, | Performed by: ORTHOPAEDIC SURGERY

## 2021-01-06 PROCEDURE — 73564 X-RAY EXAM KNEE 4 OR MORE: CPT | Mod: TC,50

## 2021-01-06 PROCEDURE — 97110 THERAPEUTIC EXERCISES: CPT | Mod: GP,S$GLB,, | Performed by: ORTHOPAEDIC SURGERY

## 2021-01-06 PROCEDURE — 99999 PR PBB SHADOW E&M-EST. PATIENT-LVL V: ICD-10-PCS | Mod: PBBFAC,,, | Performed by: ORTHOPAEDIC SURGERY

## 2021-01-06 PROCEDURE — 1126F PR PAIN SEVERITY QUANTIFIED, NO PAIN PRESENT: ICD-10-PCS | Mod: S$GLB,,, | Performed by: ORTHOPAEDIC SURGERY

## 2021-01-06 PROCEDURE — 3008F BODY MASS INDEX DOCD: CPT | Mod: CPTII,S$GLB,, | Performed by: ORTHOPAEDIC SURGERY

## 2021-01-06 PROCEDURE — 99214 OFFICE O/P EST MOD 30 MIN: CPT | Mod: S$GLB,,, | Performed by: ORTHOPAEDIC SURGERY

## 2021-01-06 PROCEDURE — 1126F AMNT PAIN NOTED NONE PRSNT: CPT | Mod: S$GLB,,, | Performed by: ORTHOPAEDIC SURGERY

## 2021-01-06 PROCEDURE — 3008F PR BODY MASS INDEX (BMI) DOCUMENTED: ICD-10-PCS | Mod: CPTII,S$GLB,, | Performed by: ORTHOPAEDIC SURGERY

## 2021-01-06 PROCEDURE — 99999 PR PBB SHADOW E&M-EST. PATIENT-LVL V: CPT | Mod: PBBFAC,,, | Performed by: ORTHOPAEDIC SURGERY

## 2021-01-06 RX ORDER — CELECOXIB 200 MG/1
200 CAPSULE ORAL 2 TIMES DAILY
Qty: 60 CAPSULE | Refills: 6 | Status: SHIPPED | OUTPATIENT
Start: 2021-01-06 | End: 2021-02-05

## 2021-01-13 ENCOUNTER — HOSPITAL ENCOUNTER (OUTPATIENT)
Dept: RADIOLOGY | Facility: HOSPITAL | Age: 36
Discharge: HOME | End: 2021-01-13
Attending: ORTHOPAEDIC SURGERY
Payer: COMMERCIAL

## 2021-01-13 DIAGNOSIS — M25.461 EFFUSION OF RIGHT KNEE: ICD-10-CM

## 2021-01-13 DIAGNOSIS — M22.2X1 PATELLOFEMORAL ARTHRALGIA OF BOTH KNEES: ICD-10-CM

## 2021-01-13 DIAGNOSIS — M22.2X2 PATELLOFEMORAL ARTHRALGIA OF BOTH KNEES: ICD-10-CM

## 2021-01-13 PROCEDURE — 73700 CT KNEE WITHOUT CONTRAST RIGHT: ICD-10-PCS | Mod: 26,RT,, | Performed by: RADIOLOGY

## 2021-01-13 PROCEDURE — 73700 CT LOWER EXTREMITY W/O DYE: CPT | Mod: TC,RT

## 2021-01-13 PROCEDURE — 73700 CT LOWER EXTREMITY W/O DYE: CPT | Mod: 26,RT,, | Performed by: RADIOLOGY

## 2021-01-20 ENCOUNTER — TELEPHONE (OUTPATIENT)
Dept: SPORTS MEDICINE | Facility: CLINIC | Age: 36
End: 2021-01-20

## 2021-01-20 ENCOUNTER — PATIENT MESSAGE (OUTPATIENT)
Dept: SPORTS MEDICINE | Facility: CLINIC | Age: 36
End: 2021-01-20

## 2021-03-02 ENCOUNTER — PATIENT MESSAGE (OUTPATIENT)
Dept: INTERNAL MEDICINE | Facility: CLINIC | Age: 36
End: 2021-03-02

## 2021-03-02 DIAGNOSIS — J30.89 PERENNIAL ALLERGIC RHINITIS: ICD-10-CM

## 2021-03-02 RX ORDER — CETIRIZINE HYDROCHLORIDE 10 MG/1
10 TABLET ORAL DAILY PRN
Qty: 90 TABLET | Refills: 3 | Status: SHIPPED | OUTPATIENT
Start: 2021-03-02 | End: 2021-04-10 | Stop reason: SDUPTHER

## 2021-03-30 ENCOUNTER — TELEPHONE (OUTPATIENT)
Dept: INTERNAL MEDICINE | Facility: CLINIC | Age: 36
End: 2021-03-30

## 2021-03-30 ENCOUNTER — PATIENT MESSAGE (OUTPATIENT)
Dept: INTERNAL MEDICINE | Facility: CLINIC | Age: 36
End: 2021-03-30

## 2021-03-31 ENCOUNTER — TELEPHONE (OUTPATIENT)
Dept: INTERNAL MEDICINE | Facility: CLINIC | Age: 36
End: 2021-03-31

## 2021-04-05 ENCOUNTER — TELEPHONE (OUTPATIENT)
Dept: BARIATRICS | Facility: CLINIC | Age: 36
End: 2021-04-05

## 2021-04-07 ENCOUNTER — IMMUNIZATION (OUTPATIENT)
Dept: OBSTETRICS AND GYNECOLOGY | Facility: CLINIC | Age: 36
End: 2021-04-07
Payer: COMMERCIAL

## 2021-04-07 DIAGNOSIS — Z23 NEED FOR VACCINATION: Primary | ICD-10-CM

## 2021-04-07 PROCEDURE — 91300 COVID-19, MRNA, LNP-S, PF, 30 MCG/0.3 ML DOSE VACCINE: CPT | Mod: PBBFAC | Performed by: FAMILY MEDICINE

## 2021-04-10 ENCOUNTER — OFFICE VISIT (OUTPATIENT)
Dept: INTERNAL MEDICINE | Facility: CLINIC | Age: 36
End: 2021-04-10
Payer: COMMERCIAL

## 2021-04-10 ENCOUNTER — LAB VISIT (OUTPATIENT)
Dept: LAB | Facility: HOSPITAL | Age: 36
End: 2021-04-10
Attending: INTERNAL MEDICINE
Payer: COMMERCIAL

## 2021-04-10 VITALS
SYSTOLIC BLOOD PRESSURE: 122 MMHG | HEIGHT: 64 IN | BODY MASS INDEX: 36.81 KG/M2 | WEIGHT: 215.63 LBS | DIASTOLIC BLOOD PRESSURE: 78 MMHG | HEART RATE: 78 BPM | OXYGEN SATURATION: 98 %

## 2021-04-10 DIAGNOSIS — Z97.5 IUD (INTRAUTERINE DEVICE) IN PLACE: ICD-10-CM

## 2021-04-10 DIAGNOSIS — Z79.899 CURRENT USE OF PROTON PUMP INHIBITOR: ICD-10-CM

## 2021-04-10 DIAGNOSIS — Z00.00 ANNUAL PHYSICAL EXAM: ICD-10-CM

## 2021-04-10 DIAGNOSIS — Z98.84 BARIATRIC SURGERY STATUS: ICD-10-CM

## 2021-04-10 DIAGNOSIS — K29.60 NSAID INDUCED GASTRITIS: ICD-10-CM

## 2021-04-10 DIAGNOSIS — T39.395A NSAID INDUCED GASTRITIS: ICD-10-CM

## 2021-04-10 DIAGNOSIS — J30.89 PERENNIAL ALLERGIC RHINITIS: ICD-10-CM

## 2021-04-10 DIAGNOSIS — Z00.00 ANNUAL PHYSICAL EXAM: Primary | ICD-10-CM

## 2021-04-10 DIAGNOSIS — E55.9 VITAMIN D INSUFFICIENCY: ICD-10-CM

## 2021-04-10 DIAGNOSIS — L21.9 SEBORRHEIC DERMATITIS OF SCALP: ICD-10-CM

## 2021-04-10 PROBLEM — M25.511 PAIN IN RIGHT SHOULDER: Status: RESOLVED | Noted: 2020-08-05 | Resolved: 2021-04-10

## 2021-04-10 PROBLEM — M25.511 ACUTE PAIN OF RIGHT SHOULDER: Status: RESOLVED | Noted: 2020-06-23 | Resolved: 2021-04-10

## 2021-04-10 LAB
25(OH)D3+25(OH)D2 SERPL-MCNC: 36 NG/ML (ref 30–96)
ALBUMIN SERPL BCP-MCNC: 3.7 G/DL (ref 3.5–5.2)
ALP SERPL-CCNC: 47 U/L (ref 55–135)
ALT SERPL W/O P-5'-P-CCNC: 18 U/L (ref 10–44)
ANION GAP SERPL CALC-SCNC: 7 MMOL/L (ref 8–16)
AST SERPL-CCNC: 15 U/L (ref 10–40)
BASOPHILS # BLD AUTO: 0.03 K/UL (ref 0–0.2)
BASOPHILS NFR BLD: 0.8 % (ref 0–1.9)
BILIRUB SERPL-MCNC: 0.4 MG/DL (ref 0.1–1)
BUN SERPL-MCNC: 17 MG/DL (ref 6–20)
CALCIUM SERPL-MCNC: 9.2 MG/DL (ref 8.7–10.5)
CHLORIDE SERPL-SCNC: 105 MMOL/L (ref 95–110)
CHOLEST SERPL-MCNC: 157 MG/DL (ref 120–199)
CHOLEST/HDLC SERPL: 2.6 {RATIO} (ref 2–5)
CO2 SERPL-SCNC: 26 MMOL/L (ref 23–29)
CREAT SERPL-MCNC: 0.8 MG/DL (ref 0.5–1.4)
DIFFERENTIAL METHOD: ABNORMAL
EOSINOPHIL # BLD AUTO: 0.1 K/UL (ref 0–0.5)
EOSINOPHIL NFR BLD: 1.5 % (ref 0–8)
ERYTHROCYTE [DISTWIDTH] IN BLOOD BY AUTOMATED COUNT: 12.6 % (ref 11.5–14.5)
EST. GFR  (AFRICAN AMERICAN): >60 ML/MIN/1.73 M^2
EST. GFR  (NON AFRICAN AMERICAN): >60 ML/MIN/1.73 M^2
ESTIMATED AVG GLUCOSE: 103 MG/DL (ref 68–131)
FERRITIN SERPL-MCNC: 33 NG/ML (ref 20–300)
GLUCOSE SERPL-MCNC: 85 MG/DL (ref 70–110)
HBA1C MFR BLD: 5.2 % (ref 4–5.6)
HCT VFR BLD AUTO: 40.4 % (ref 37–48.5)
HDLC SERPL-MCNC: 60 MG/DL (ref 40–75)
HDLC SERPL: 38.2 % (ref 20–50)
HGB BLD-MCNC: 13 G/DL (ref 12–16)
IMM GRANULOCYTES # BLD AUTO: 0.01 K/UL (ref 0–0.04)
IMM GRANULOCYTES NFR BLD AUTO: 0.3 % (ref 0–0.5)
IRON SERPL-MCNC: 76 UG/DL (ref 30–160)
LDLC SERPL CALC-MCNC: 89.4 MG/DL (ref 63–159)
LYMPHOCYTES # BLD AUTO: 1.7 K/UL (ref 1–4.8)
LYMPHOCYTES NFR BLD: 43.5 % (ref 18–48)
MCH RBC QN AUTO: 29.8 PG (ref 27–31)
MCHC RBC AUTO-ENTMCNC: 32.2 G/DL (ref 32–36)
MCV RBC AUTO: 93 FL (ref 82–98)
MONOCYTES # BLD AUTO: 0.6 K/UL (ref 0.3–1)
MONOCYTES NFR BLD: 14.3 % (ref 4–15)
NEUTROPHILS # BLD AUTO: 1.6 K/UL (ref 1.8–7.7)
NEUTROPHILS NFR BLD: 39.6 % (ref 38–73)
NONHDLC SERPL-MCNC: 97 MG/DL
NRBC BLD-RTO: 0 /100 WBC
PLATELET # BLD AUTO: 337 K/UL (ref 150–450)
PMV BLD AUTO: 9.3 FL (ref 9.2–12.9)
POTASSIUM SERPL-SCNC: 4.2 MMOL/L (ref 3.5–5.1)
PROT SERPL-MCNC: 7 G/DL (ref 6–8.4)
RBC # BLD AUTO: 4.36 M/UL (ref 4–5.4)
SATURATED IRON: 20 % (ref 20–50)
SODIUM SERPL-SCNC: 138 MMOL/L (ref 136–145)
TOTAL IRON BINDING CAPACITY: 376 UG/DL (ref 250–450)
TRANSFERRIN SERPL-MCNC: 254 MG/DL (ref 200–375)
TRIGL SERPL-MCNC: 38 MG/DL (ref 30–150)
TSH SERPL DL<=0.005 MIU/L-ACNC: 0.7 UIU/ML (ref 0.4–4)
VIT B12 SERPL-MCNC: 329 PG/ML (ref 210–950)
WBC # BLD AUTO: 4 K/UL (ref 3.9–12.7)

## 2021-04-10 PROCEDURE — 80061 LIPID PANEL: CPT | Performed by: INTERNAL MEDICINE

## 2021-04-10 PROCEDURE — 82306 VITAMIN D 25 HYDROXY: CPT | Performed by: INTERNAL MEDICINE

## 2021-04-10 PROCEDURE — 99999 PR PBB SHADOW E&M-EST. PATIENT-LVL III: CPT | Mod: PBBFAC,,, | Performed by: INTERNAL MEDICINE

## 2021-04-10 PROCEDURE — 1126F PR PAIN SEVERITY QUANTIFIED, NO PAIN PRESENT: ICD-10-PCS | Mod: S$GLB,,, | Performed by: INTERNAL MEDICINE

## 2021-04-10 PROCEDURE — 86803 HEPATITIS C AB TEST: CPT | Performed by: INTERNAL MEDICINE

## 2021-04-10 PROCEDURE — 1126F AMNT PAIN NOTED NONE PRSNT: CPT | Mod: S$GLB,,, | Performed by: INTERNAL MEDICINE

## 2021-04-10 PROCEDURE — 80053 COMPREHEN METABOLIC PANEL: CPT | Performed by: INTERNAL MEDICINE

## 2021-04-10 PROCEDURE — 3008F PR BODY MASS INDEX (BMI) DOCUMENTED: ICD-10-PCS | Mod: CPTII,S$GLB,, | Performed by: INTERNAL MEDICINE

## 2021-04-10 PROCEDURE — 3008F BODY MASS INDEX DOCD: CPT | Mod: CPTII,S$GLB,, | Performed by: INTERNAL MEDICINE

## 2021-04-10 PROCEDURE — 84443 ASSAY THYROID STIM HORMONE: CPT | Performed by: INTERNAL MEDICINE

## 2021-04-10 PROCEDURE — 99999 PR PBB SHADOW E&M-EST. PATIENT-LVL III: ICD-10-PCS | Mod: PBBFAC,,, | Performed by: INTERNAL MEDICINE

## 2021-04-10 PROCEDURE — 83540 ASSAY OF IRON: CPT | Performed by: INTERNAL MEDICINE

## 2021-04-10 PROCEDURE — 99214 OFFICE O/P EST MOD 30 MIN: CPT | Mod: S$GLB,,, | Performed by: INTERNAL MEDICINE

## 2021-04-10 PROCEDURE — 99214 PR OFFICE/OUTPT VISIT, EST, LEVL IV, 30-39 MIN: ICD-10-PCS | Mod: S$GLB,,, | Performed by: INTERNAL MEDICINE

## 2021-04-10 PROCEDURE — 85025 COMPLETE CBC W/AUTO DIFF WBC: CPT | Performed by: INTERNAL MEDICINE

## 2021-04-10 PROCEDURE — 82607 VITAMIN B-12: CPT | Performed by: INTERNAL MEDICINE

## 2021-04-10 PROCEDURE — 83036 HEMOGLOBIN GLYCOSYLATED A1C: CPT | Performed by: INTERNAL MEDICINE

## 2021-04-10 PROCEDURE — 36415 COLL VENOUS BLD VENIPUNCTURE: CPT | Performed by: INTERNAL MEDICINE

## 2021-04-10 PROCEDURE — 82728 ASSAY OF FERRITIN: CPT | Performed by: INTERNAL MEDICINE

## 2021-04-10 RX ORDER — PHENTERMINE HYDROCHLORIDE 37.5 MG/1
37.5 TABLET ORAL
Qty: 90 TABLET | Refills: 0 | Status: SHIPPED | OUTPATIENT
Start: 2021-04-10 | End: 2021-08-10

## 2021-04-10 RX ORDER — ACETAMINOPHEN 500 MG
1 TABLET ORAL DAILY
Qty: 90 CAPSULE | Refills: 3 | Status: SHIPPED | OUTPATIENT
Start: 2021-04-10 | End: 2022-06-13 | Stop reason: SDUPTHER

## 2021-04-10 RX ORDER — CETIRIZINE HYDROCHLORIDE 10 MG/1
10 TABLET ORAL DAILY PRN
Qty: 90 TABLET | Refills: 3 | Status: SHIPPED | OUTPATIENT
Start: 2021-04-10 | End: 2022-06-13 | Stop reason: SDUPTHER

## 2021-04-10 RX ORDER — FLUTICASONE PROPIONATE 50 MCG
2 SPRAY, SUSPENSION (ML) NASAL 2 TIMES DAILY
Qty: 16 G | Refills: 12 | Status: SHIPPED | OUTPATIENT
Start: 2021-04-10

## 2021-04-10 RX ORDER — OMEPRAZOLE 40 MG/1
40 CAPSULE, DELAYED RELEASE ORAL DAILY
Qty: 90 CAPSULE | Refills: 3 | Status: SHIPPED | OUTPATIENT
Start: 2021-04-10 | End: 2022-06-13

## 2021-04-10 RX ORDER — KETOCONAZOLE 20 MG/G
CREAM TOPICAL 2 TIMES DAILY
Qty: 30 G | Refills: 3 | Status: SHIPPED | OUTPATIENT
Start: 2021-04-10

## 2021-04-13 LAB — HCV AB SERPL QL IA: NEGATIVE

## 2021-04-14 ENCOUNTER — DOCUMENTATION ONLY (OUTPATIENT)
Dept: BARIATRICS | Facility: CLINIC | Age: 36
End: 2021-04-14

## 2021-04-28 ENCOUNTER — IMMUNIZATION (OUTPATIENT)
Dept: OBSTETRICS AND GYNECOLOGY | Facility: CLINIC | Age: 36
End: 2021-04-28

## 2021-04-28 DIAGNOSIS — Z23 NEED FOR VACCINATION: Primary | ICD-10-CM

## 2021-04-28 PROCEDURE — 91300 COVID-19, MRNA, LNP-S, PF, 30 MCG/0.3 ML DOSE VACCINE: CPT | Mod: ,,, | Performed by: FAMILY MEDICINE

## 2021-04-28 PROCEDURE — 0002A COVID-19, MRNA, LNP-S, PF, 30 MCG/0.3 ML DOSE VACCINE: CPT | Mod: CV19,,, | Performed by: FAMILY MEDICINE

## 2021-04-28 PROCEDURE — 91300 COVID-19, MRNA, LNP-S, PF, 30 MCG/0.3 ML DOSE VACCINE: ICD-10-PCS | Mod: ,,, | Performed by: FAMILY MEDICINE

## 2021-04-28 PROCEDURE — 0002A COVID-19, MRNA, LNP-S, PF, 30 MCG/0.3 ML DOSE VACCINE: ICD-10-PCS | Mod: CV19,,, | Performed by: FAMILY MEDICINE

## 2021-06-15 ENCOUNTER — OFFICE VISIT (OUTPATIENT)
Dept: BARIATRICS | Facility: CLINIC | Age: 36
End: 2021-06-15
Payer: COMMERCIAL

## 2021-06-15 VITALS
HEART RATE: 104 BPM | WEIGHT: 210.5 LBS | DIASTOLIC BLOOD PRESSURE: 74 MMHG | OXYGEN SATURATION: 99 % | HEIGHT: 66 IN | SYSTOLIC BLOOD PRESSURE: 116 MMHG | BODY MASS INDEX: 33.83 KG/M2

## 2021-06-15 DIAGNOSIS — Z98.84 HISTORY OF LAPAROSCOPIC ADJUSTABLE GASTRIC BANDING: ICD-10-CM

## 2021-06-15 DIAGNOSIS — K21.9 GASTROESOPHAGEAL REFLUX DISEASE, UNSPECIFIED WHETHER ESOPHAGITIS PRESENT: ICD-10-CM

## 2021-06-15 DIAGNOSIS — E66.9 OBESITY, CLASS I, BMI 30.0-34.9 (SEE ACTUAL BMI): Primary | ICD-10-CM

## 2021-06-15 PROCEDURE — 99215 OFFICE O/P EST HI 40 MIN: CPT | Mod: S$GLB,,, | Performed by: INTERNAL MEDICINE

## 2021-06-15 PROCEDURE — 99999 PR PBB SHADOW E&M-EST. PATIENT-LVL IV: CPT | Mod: PBBFAC,,, | Performed by: INTERNAL MEDICINE

## 2021-06-15 PROCEDURE — 1126F AMNT PAIN NOTED NONE PRSNT: CPT | Mod: S$GLB,,, | Performed by: INTERNAL MEDICINE

## 2021-06-15 PROCEDURE — 3008F PR BODY MASS INDEX (BMI) DOCUMENTED: ICD-10-PCS | Mod: CPTII,S$GLB,, | Performed by: INTERNAL MEDICINE

## 2021-06-15 PROCEDURE — 3008F BODY MASS INDEX DOCD: CPT | Mod: CPTII,S$GLB,, | Performed by: INTERNAL MEDICINE

## 2021-06-15 PROCEDURE — 99215 PR OFFICE/OUTPT VISIT, EST, LEVL V, 40-54 MIN: ICD-10-PCS | Mod: S$GLB,,, | Performed by: INTERNAL MEDICINE

## 2021-06-15 PROCEDURE — 1126F PR PAIN SEVERITY QUANTIFIED, NO PAIN PRESENT: ICD-10-PCS | Mod: S$GLB,,, | Performed by: INTERNAL MEDICINE

## 2021-06-15 PROCEDURE — 99999 PR PBB SHADOW E&M-EST. PATIENT-LVL IV: ICD-10-PCS | Mod: PBBFAC,,, | Performed by: INTERNAL MEDICINE

## 2021-06-15 RX ORDER — TOPIRAMATE 25 MG/1
25 TABLET ORAL 2 TIMES DAILY
Qty: 60 TABLET | Refills: 3 | Status: SHIPPED | OUTPATIENT
Start: 2021-06-15 | End: 2021-08-10 | Stop reason: SDUPTHER

## 2021-07-18 ENCOUNTER — PATIENT MESSAGE (OUTPATIENT)
Dept: INTERNAL MEDICINE | Facility: CLINIC | Age: 36
End: 2021-07-18

## 2021-08-06 ENCOUNTER — TELEPHONE (OUTPATIENT)
Dept: OPTOMETRY | Facility: CLINIC | Age: 36
End: 2021-08-06

## 2021-08-06 ENCOUNTER — PATIENT MESSAGE (OUTPATIENT)
Dept: OPTOMETRY | Facility: CLINIC | Age: 36
End: 2021-08-06

## 2021-08-10 ENCOUNTER — OFFICE VISIT (OUTPATIENT)
Dept: BARIATRICS | Facility: CLINIC | Age: 36
End: 2021-08-10
Payer: COMMERCIAL

## 2021-08-10 VITALS
HEIGHT: 66 IN | BODY MASS INDEX: 34.52 KG/M2 | WEIGHT: 214.81 LBS | DIASTOLIC BLOOD PRESSURE: 62 MMHG | OXYGEN SATURATION: 100 % | SYSTOLIC BLOOD PRESSURE: 102 MMHG | HEART RATE: 99 BPM

## 2021-08-10 DIAGNOSIS — K21.9 GASTROESOPHAGEAL REFLUX DISEASE, UNSPECIFIED WHETHER ESOPHAGITIS PRESENT: ICD-10-CM

## 2021-08-10 DIAGNOSIS — Z98.84 BARIATRIC SURGERY STATUS: ICD-10-CM

## 2021-08-10 DIAGNOSIS — Z98.84 HISTORY OF LAPAROSCOPIC ADJUSTABLE GASTRIC BANDING: Primary | ICD-10-CM

## 2021-08-10 DIAGNOSIS — E66.9 OBESITY, CLASS I, BMI 30.0-34.9 (SEE ACTUAL BMI): ICD-10-CM

## 2021-08-10 DIAGNOSIS — K22.89 DILATION OF ESOPHAGUS: ICD-10-CM

## 2021-08-10 DIAGNOSIS — K95.09 OTHER COMPLICATIONS OF GASTRIC BAND PROCEDURE: ICD-10-CM

## 2021-08-10 PROCEDURE — 99212 PR OFFICE/OUTPT VISIT, EST, LEVL II, 10-19 MIN: ICD-10-PCS | Mod: S$GLB,,, | Performed by: INTERNAL MEDICINE

## 2021-08-10 PROCEDURE — 3044F HG A1C LEVEL LT 7.0%: CPT | Mod: CPTII,S$GLB,, | Performed by: INTERNAL MEDICINE

## 2021-08-10 PROCEDURE — 1126F AMNT PAIN NOTED NONE PRSNT: CPT | Mod: CPTII,S$GLB,, | Performed by: INTERNAL MEDICINE

## 2021-08-10 PROCEDURE — 3008F PR BODY MASS INDEX (BMI) DOCUMENTED: ICD-10-PCS | Mod: CPTII,S$GLB,, | Performed by: INTERNAL MEDICINE

## 2021-08-10 PROCEDURE — 1160F RVW MEDS BY RX/DR IN RCRD: CPT | Mod: CPTII,S$GLB,, | Performed by: INTERNAL MEDICINE

## 2021-08-10 PROCEDURE — 3008F BODY MASS INDEX DOCD: CPT | Mod: CPTII,S$GLB,, | Performed by: INTERNAL MEDICINE

## 2021-08-10 PROCEDURE — 3074F SYST BP LT 130 MM HG: CPT | Mod: CPTII,S$GLB,, | Performed by: INTERNAL MEDICINE

## 2021-08-10 PROCEDURE — 1160F PR REVIEW ALL MEDS BY PRESCRIBER/CLIN PHARMACIST DOCUMENTED: ICD-10-PCS | Mod: CPTII,S$GLB,, | Performed by: INTERNAL MEDICINE

## 2021-08-10 PROCEDURE — 3078F DIAST BP <80 MM HG: CPT | Mod: CPTII,S$GLB,, | Performed by: INTERNAL MEDICINE

## 2021-08-10 PROCEDURE — 99212 OFFICE O/P EST SF 10 MIN: CPT | Mod: S$GLB,,, | Performed by: INTERNAL MEDICINE

## 2021-08-10 PROCEDURE — 99999 PR PBB SHADOW E&M-EST. PATIENT-LVL IV: ICD-10-PCS | Mod: PBBFAC,,, | Performed by: INTERNAL MEDICINE

## 2021-08-10 PROCEDURE — 99999 PR PBB SHADOW E&M-EST. PATIENT-LVL IV: CPT | Mod: PBBFAC,,, | Performed by: INTERNAL MEDICINE

## 2021-08-10 PROCEDURE — 3044F PR MOST RECENT HEMOGLOBIN A1C LEVEL <7.0%: ICD-10-PCS | Mod: CPTII,S$GLB,, | Performed by: INTERNAL MEDICINE

## 2021-08-10 PROCEDURE — 1159F MED LIST DOCD IN RCRD: CPT | Mod: CPTII,S$GLB,, | Performed by: INTERNAL MEDICINE

## 2021-08-10 PROCEDURE — 3078F PR MOST RECENT DIASTOLIC BLOOD PRESSURE < 80 MM HG: ICD-10-PCS | Mod: CPTII,S$GLB,, | Performed by: INTERNAL MEDICINE

## 2021-08-10 PROCEDURE — 3074F PR MOST RECENT SYSTOLIC BLOOD PRESSURE < 130 MM HG: ICD-10-PCS | Mod: CPTII,S$GLB,, | Performed by: INTERNAL MEDICINE

## 2021-08-10 PROCEDURE — 1159F PR MEDICATION LIST DOCUMENTED IN MEDICAL RECORD: ICD-10-PCS | Mod: CPTII,S$GLB,, | Performed by: INTERNAL MEDICINE

## 2021-08-10 PROCEDURE — 1126F PR PAIN SEVERITY QUANTIFIED, NO PAIN PRESENT: ICD-10-PCS | Mod: CPTII,S$GLB,, | Performed by: INTERNAL MEDICINE

## 2021-08-10 RX ORDER — TOPIRAMATE 50 MG/1
50 TABLET, FILM COATED ORAL 2 TIMES DAILY
Qty: 60 TABLET | Refills: 3 | Status: SHIPPED | OUTPATIENT
Start: 2021-08-10 | End: 2022-02-21

## 2021-08-10 RX ORDER — COPPER 313.4 MG/1
1 INTRAUTERINE DEVICE INTRAUTERINE ONCE
COMMUNITY

## 2021-08-11 ENCOUNTER — HOSPITAL ENCOUNTER (OUTPATIENT)
Dept: RADIOLOGY | Facility: HOSPITAL | Age: 36
Discharge: HOME | End: 2021-08-11
Attending: INTERNAL MEDICINE
Payer: COMMERCIAL

## 2021-08-11 ENCOUNTER — PATIENT MESSAGE (OUTPATIENT)
Dept: BARIATRICS | Facility: CLINIC | Age: 36
End: 2021-08-11

## 2021-08-11 ENCOUNTER — TELEPHONE (OUTPATIENT)
Dept: BARIATRICS | Facility: CLINIC | Age: 36
End: 2021-08-11

## 2021-08-11 DIAGNOSIS — Z98.84 HISTORY OF LAPAROSCOPIC ADJUSTABLE GASTRIC BANDING: ICD-10-CM

## 2021-08-11 DIAGNOSIS — K21.9 GASTROESOPHAGEAL REFLUX DISEASE, UNSPECIFIED WHETHER ESOPHAGITIS PRESENT: ICD-10-CM

## 2021-08-11 PROCEDURE — 74240 X-RAY XM UPR GI TRC 1CNTRST: CPT | Mod: TC,FY

## 2021-08-11 PROCEDURE — 74240 X-RAY XM UPR GI TRC 1CNTRST: CPT | Mod: 26,,, | Performed by: RADIOLOGY

## 2021-08-11 PROCEDURE — A9698 NON-RAD CONTRAST MATERIALNOC: HCPCS | Performed by: INTERNAL MEDICINE

## 2021-08-11 PROCEDURE — 25500020 PHARM REV CODE 255: Performed by: INTERNAL MEDICINE

## 2021-08-11 PROCEDURE — 74240 FL UPPER GI: ICD-10-PCS | Mod: 26,,, | Performed by: RADIOLOGY

## 2021-08-11 RX ADMIN — BARIUM SULFATE 20 ML: 0.6 SUSPENSION ORAL at 10:08

## 2021-08-13 ENCOUNTER — OFFICE VISIT (OUTPATIENT)
Dept: BARIATRICS | Facility: CLINIC | Age: 36
End: 2021-08-13
Payer: COMMERCIAL

## 2021-08-13 VITALS
RESPIRATION RATE: 16 BRPM | HEIGHT: 66 IN | OXYGEN SATURATION: 99 % | HEART RATE: 86 BPM | WEIGHT: 211.19 LBS | BODY MASS INDEX: 33.94 KG/M2

## 2021-08-13 DIAGNOSIS — Z98.84 HISTORY OF LAPAROSCOPIC ADJUSTABLE GASTRIC BANDING: ICD-10-CM

## 2021-08-13 DIAGNOSIS — K95.09 OTHER COMPLICATIONS OF GASTRIC BAND PROCEDURE: ICD-10-CM

## 2021-08-13 DIAGNOSIS — Z98.84 H/O LAPAROSCOPIC ADJUSTABLE GASTRIC BANDING: Primary | ICD-10-CM

## 2021-08-13 DIAGNOSIS — K21.9 GASTROESOPHAGEAL REFLUX DISEASE, UNSPECIFIED WHETHER ESOPHAGITIS PRESENT: ICD-10-CM

## 2021-08-13 PROCEDURE — 3008F PR BODY MASS INDEX (BMI) DOCUMENTED: ICD-10-PCS | Mod: CPTII,S$GLB,, | Performed by: PHYSICIAN ASSISTANT

## 2021-08-13 PROCEDURE — 99999 PR PBB SHADOW E&M-EST. PATIENT-LVL III: CPT | Mod: PBBFAC,,, | Performed by: PHYSICIAN ASSISTANT

## 2021-08-13 PROCEDURE — 1160F PR REVIEW ALL MEDS BY PRESCRIBER/CLIN PHARMACIST DOCUMENTED: ICD-10-PCS | Mod: CPTII,S$GLB,, | Performed by: PHYSICIAN ASSISTANT

## 2021-08-13 PROCEDURE — 3008F BODY MASS INDEX DOCD: CPT | Mod: CPTII,S$GLB,, | Performed by: PHYSICIAN ASSISTANT

## 2021-08-13 PROCEDURE — 1159F MED LIST DOCD IN RCRD: CPT | Mod: CPTII,S$GLB,, | Performed by: PHYSICIAN ASSISTANT

## 2021-08-13 PROCEDURE — 1126F AMNT PAIN NOTED NONE PRSNT: CPT | Mod: CPTII,S$GLB,, | Performed by: PHYSICIAN ASSISTANT

## 2021-08-13 PROCEDURE — 99203 OFFICE O/P NEW LOW 30 MIN: CPT | Mod: S$GLB,,, | Performed by: PHYSICIAN ASSISTANT

## 2021-08-13 PROCEDURE — 99203 PR OFFICE/OUTPT VISIT, NEW, LEVL III, 30-44 MIN: ICD-10-PCS | Mod: S$GLB,,, | Performed by: PHYSICIAN ASSISTANT

## 2021-08-13 PROCEDURE — 99999 PR PBB SHADOW E&M-EST. PATIENT-LVL III: ICD-10-PCS | Mod: PBBFAC,,, | Performed by: PHYSICIAN ASSISTANT

## 2021-08-13 PROCEDURE — 1160F RVW MEDS BY RX/DR IN RCRD: CPT | Mod: CPTII,S$GLB,, | Performed by: PHYSICIAN ASSISTANT

## 2021-08-13 PROCEDURE — 1159F PR MEDICATION LIST DOCUMENTED IN MEDICAL RECORD: ICD-10-PCS | Mod: CPTII,S$GLB,, | Performed by: PHYSICIAN ASSISTANT

## 2021-08-13 PROCEDURE — 3044F PR MOST RECENT HEMOGLOBIN A1C LEVEL <7.0%: ICD-10-PCS | Mod: CPTII,S$GLB,, | Performed by: PHYSICIAN ASSISTANT

## 2021-08-13 PROCEDURE — 3044F HG A1C LEVEL LT 7.0%: CPT | Mod: CPTII,S$GLB,, | Performed by: PHYSICIAN ASSISTANT

## 2021-08-13 PROCEDURE — 1126F PR PAIN SEVERITY QUANTIFIED, NO PAIN PRESENT: ICD-10-PCS | Mod: CPTII,S$GLB,, | Performed by: PHYSICIAN ASSISTANT

## 2021-08-16 ENCOUNTER — OFFICE VISIT (OUTPATIENT)
Dept: OPTOMETRY | Facility: CLINIC | Age: 36
End: 2021-08-16
Payer: COMMERCIAL

## 2021-08-16 DIAGNOSIS — Z46.0 FITTING AND ADJUSTMENT OF SPECTACLES AND CONTACT LENSES: Primary | ICD-10-CM

## 2021-08-16 DIAGNOSIS — H52.13 MYOPIA OF BOTH EYES WITH ASTIGMATISM: Primary | ICD-10-CM

## 2021-08-16 DIAGNOSIS — H52.203 MYOPIA OF BOTH EYES WITH ASTIGMATISM: Primary | ICD-10-CM

## 2021-08-16 DIAGNOSIS — Z97.3 WEARS CONTACT LENSES: ICD-10-CM

## 2021-08-16 PROCEDURE — 92310 CONTACT LENS FITTING OU: CPT | Mod: S$GLB,,, | Performed by: OPTOMETRIST

## 2021-08-16 PROCEDURE — 99999 PR PBB SHADOW E&M-EST. PATIENT-LVL I: CPT | Mod: PBBFAC,,, | Performed by: OPTOMETRIST

## 2021-08-16 PROCEDURE — 92012 INTRM OPH EXAM EST PATIENT: CPT | Mod: S$GLB,,, | Performed by: OPTOMETRIST

## 2021-08-16 PROCEDURE — 92015 PR REFRACTION: ICD-10-PCS | Mod: S$GLB,,, | Performed by: OPTOMETRIST

## 2021-08-16 PROCEDURE — 92015 DETERMINE REFRACTIVE STATE: CPT | Mod: S$GLB,,, | Performed by: OPTOMETRIST

## 2021-08-16 PROCEDURE — 92310 PR CONTACT LENS FITTING (NO CHANGE): ICD-10-PCS | Mod: S$GLB,,, | Performed by: OPTOMETRIST

## 2021-08-16 PROCEDURE — 99999 PR PBB SHADOW E&M-EST. PATIENT-LVL I: ICD-10-PCS | Mod: PBBFAC,,, | Performed by: OPTOMETRIST

## 2021-08-16 PROCEDURE — 92012 PR EYE EXAM, EST PATIENT,INTERMED: ICD-10-PCS | Mod: S$GLB,,, | Performed by: OPTOMETRIST

## 2021-08-18 ENCOUNTER — TELEPHONE (OUTPATIENT)
Dept: ENDOSCOPY | Facility: HOSPITAL | Age: 36
End: 2021-08-18

## 2021-08-23 ENCOUNTER — TELEPHONE (OUTPATIENT)
Dept: ENDOSCOPY | Facility: HOSPITAL | Age: 36
End: 2021-08-23

## 2021-08-23 ENCOUNTER — PATIENT MESSAGE (OUTPATIENT)
Dept: ENDOSCOPY | Facility: HOSPITAL | Age: 36
End: 2021-08-23

## 2021-08-23 DIAGNOSIS — Z01.818 PRE-OP TESTING: Primary | ICD-10-CM

## 2021-08-27 ENCOUNTER — HOSPITAL ENCOUNTER (OUTPATIENT)
Facility: HOSPITAL | Age: 36
Discharge: HOME | End: 2021-08-27
Attending: INTERNAL MEDICINE | Admitting: INTERNAL MEDICINE
Payer: COMMERCIAL

## 2021-08-27 ENCOUNTER — ANESTHESIA EVENT (OUTPATIENT)
Dept: ENDOSCOPY | Facility: HOSPITAL | Age: 36
End: 2021-08-27
Payer: COMMERCIAL

## 2021-08-27 ENCOUNTER — ANESTHESIA (OUTPATIENT)
Dept: ENDOSCOPY | Facility: HOSPITAL | Age: 36
End: 2021-08-27
Payer: COMMERCIAL

## 2021-08-27 VITALS
OXYGEN SATURATION: 100 % | DIASTOLIC BLOOD PRESSURE: 88 MMHG | RESPIRATION RATE: 20 BRPM | TEMPERATURE: 98 F | SYSTOLIC BLOOD PRESSURE: 127 MMHG | WEIGHT: 210 LBS | BODY MASS INDEX: 35.85 KG/M2 | HEIGHT: 64 IN | HEART RATE: 80 BPM

## 2021-08-27 DIAGNOSIS — R13.10 DYSPHAGIA: ICD-10-CM

## 2021-08-27 LAB — SARS-COV-2 RDRP RESP QL NAA+PROBE: NEGATIVE

## 2021-08-27 PROCEDURE — 37000009 HC ANESTHESIA EA ADD 15 MINS: Performed by: INTERNAL MEDICINE

## 2021-08-27 PROCEDURE — 43235 EGD DIAGNOSTIC BRUSH WASH: CPT | Performed by: INTERNAL MEDICINE

## 2021-08-27 PROCEDURE — D9220A PRA ANESTHESIA: Mod: CRNA,,, | Performed by: NURSE ANESTHETIST, CERTIFIED REGISTERED

## 2021-08-27 PROCEDURE — 43235 EGD DIAGNOSTIC BRUSH WASH: CPT | Mod: ,,, | Performed by: INTERNAL MEDICINE

## 2021-08-27 PROCEDURE — D9220A PRA ANESTHESIA: Mod: ANES,,, | Performed by: ANESTHESIOLOGY

## 2021-08-27 PROCEDURE — U0002 COVID-19 LAB TEST NON-CDC: HCPCS | Performed by: INTERNAL MEDICINE

## 2021-08-27 PROCEDURE — 43235 PR EGD, FLEX, DIAGNOSTIC: ICD-10-PCS | Mod: ,,, | Performed by: INTERNAL MEDICINE

## 2021-08-27 PROCEDURE — D9220A PRA ANESTHESIA: ICD-10-PCS | Mod: ANES,,, | Performed by: ANESTHESIOLOGY

## 2021-08-27 PROCEDURE — 25000003 PHARM REV CODE 250: Performed by: NURSE ANESTHETIST, CERTIFIED REGISTERED

## 2021-08-27 PROCEDURE — D9220A PRA ANESTHESIA: ICD-10-PCS | Mod: CRNA,,, | Performed by: NURSE ANESTHETIST, CERTIFIED REGISTERED

## 2021-08-27 PROCEDURE — 63600175 PHARM REV CODE 636 W HCPCS: Performed by: NURSE ANESTHETIST, CERTIFIED REGISTERED

## 2021-08-27 PROCEDURE — 37000008 HC ANESTHESIA 1ST 15 MINUTES: Performed by: INTERNAL MEDICINE

## 2021-08-27 RX ORDER — SODIUM CHLORIDE 0.9 % (FLUSH) 0.9 %
3 SYRINGE (ML) INJECTION
Status: DISCONTINUED | OUTPATIENT
Start: 2021-08-27 | End: 2021-08-27 | Stop reason: HOSPADM

## 2021-08-27 RX ORDER — DIPHENHYDRAMINE HYDROCHLORIDE 50 MG/ML
25 INJECTION INTRAMUSCULAR; INTRAVENOUS EVERY 6 HOURS PRN
Status: DISCONTINUED | OUTPATIENT
Start: 2021-08-27 | End: 2021-08-27 | Stop reason: HOSPADM

## 2021-08-27 RX ORDER — FENTANYL CITRATE 50 UG/ML
25 INJECTION, SOLUTION INTRAMUSCULAR; INTRAVENOUS EVERY 5 MIN PRN
Status: DISCONTINUED | OUTPATIENT
Start: 2021-08-27 | End: 2021-08-27 | Stop reason: HOSPADM

## 2021-08-27 RX ORDER — PROCHLORPERAZINE EDISYLATE 5 MG/ML
5 INJECTION INTRAMUSCULAR; INTRAVENOUS EVERY 30 MIN PRN
Status: DISCONTINUED | OUTPATIENT
Start: 2021-08-27 | End: 2021-08-27 | Stop reason: HOSPADM

## 2021-08-27 RX ORDER — SODIUM CHLORIDE 9 MG/ML
INJECTION, SOLUTION INTRAVENOUS CONTINUOUS
Status: DISCONTINUED | OUTPATIENT
Start: 2021-08-27 | End: 2021-08-27 | Stop reason: HOSPADM

## 2021-08-27 RX ORDER — SODIUM CHLORIDE 0.9 % (FLUSH) 0.9 %
10 SYRINGE (ML) INJECTION
Status: DISCONTINUED | OUTPATIENT
Start: 2021-08-27 | End: 2021-08-27 | Stop reason: HOSPADM

## 2021-08-27 RX ORDER — LIDOCAINE HYDROCHLORIDE 20 MG/ML
INJECTION, SOLUTION EPIDURAL; INFILTRATION; INTRACAUDAL; PERINEURAL
Status: DISCONTINUED | OUTPATIENT
Start: 2021-08-27 | End: 2021-08-27

## 2021-08-27 RX ORDER — PROPOFOL 10 MG/ML
VIAL (ML) INTRAVENOUS CONTINUOUS PRN
Status: DISCONTINUED | OUTPATIENT
Start: 2021-08-27 | End: 2021-08-27

## 2021-08-27 RX ORDER — PROPOFOL 10 MG/ML
VIAL (ML) INTRAVENOUS
Status: DISCONTINUED | OUTPATIENT
Start: 2021-08-27 | End: 2021-08-27

## 2021-08-27 RX ADMIN — SODIUM CHLORIDE: 0.9 INJECTION, SOLUTION INTRAVENOUS at 10:08

## 2021-08-27 RX ADMIN — PROPOFOL 90 MG: 10 INJECTION, EMULSION INTRAVENOUS at 11:08

## 2021-08-27 RX ADMIN — Medication 200 MCG/KG/MIN: at 11:08

## 2021-08-27 RX ADMIN — LIDOCAINE HYDROCHLORIDE 100 MG: 20 INJECTION, SOLUTION EPIDURAL; INFILTRATION; INTRACAUDAL at 11:08

## 2021-08-27 RX ADMIN — PROPOFOL 60 MG: 10 INJECTION, EMULSION INTRAVENOUS at 11:08

## 2021-08-27 RX ADMIN — GLYCOPYRROLATE 0.2 MG: 0.2 INJECTION, SOLUTION INTRAMUSCULAR; INTRAVITREAL at 11:08

## 2021-09-09 ENCOUNTER — PATIENT MESSAGE (OUTPATIENT)
Dept: BARIATRICS | Facility: CLINIC | Age: 36
End: 2021-09-09

## 2021-09-10 ENCOUNTER — PATIENT MESSAGE (OUTPATIENT)
Dept: BARIATRICS | Facility: CLINIC | Age: 36
End: 2021-09-10

## 2021-09-10 DIAGNOSIS — K21.9 GASTROESOPHAGEAL REFLUX DISEASE, UNSPECIFIED WHETHER ESOPHAGITIS PRESENT: Primary | ICD-10-CM

## 2021-09-10 RX ORDER — PANTOPRAZOLE SODIUM 40 MG/1
40 TABLET, DELAYED RELEASE ORAL DAILY
Qty: 30 TABLET | Refills: 12 | Status: SHIPPED | OUTPATIENT
Start: 2021-09-10 | End: 2022-06-13

## 2021-09-15 ENCOUNTER — HOSPITAL ENCOUNTER (OUTPATIENT)
Dept: CARDIOLOGY | Facility: CLINIC | Age: 36
Discharge: HOME | End: 2021-09-15
Payer: COMMERCIAL

## 2021-09-15 ENCOUNTER — DOCUMENTATION ONLY (OUTPATIENT)
Dept: BARIATRICS | Facility: CLINIC | Age: 36
End: 2021-09-15

## 2021-09-15 ENCOUNTER — OFFICE VISIT (OUTPATIENT)
Dept: BARIATRICS | Facility: CLINIC | Age: 36
End: 2021-09-15
Payer: COMMERCIAL

## 2021-09-15 ENCOUNTER — CLINICAL SUPPORT (OUTPATIENT)
Dept: BARIATRICS | Facility: CLINIC | Age: 36
End: 2021-09-15
Payer: COMMERCIAL

## 2021-09-15 ENCOUNTER — HOSPITAL ENCOUNTER (OUTPATIENT)
Dept: RADIOLOGY | Facility: HOSPITAL | Age: 36
Discharge: HOME | End: 2021-09-15
Attending: PHYSICIAN ASSISTANT
Payer: COMMERCIAL

## 2021-09-15 ENCOUNTER — PATIENT MESSAGE (OUTPATIENT)
Dept: BARIATRICS | Facility: CLINIC | Age: 36
End: 2021-09-15

## 2021-09-15 VITALS
HEART RATE: 76 BPM | BODY MASS INDEX: 34.72 KG/M2 | BODY MASS INDEX: 34.72 KG/M2 | HEIGHT: 66 IN | DIASTOLIC BLOOD PRESSURE: 80 MMHG | HEIGHT: 66 IN | SYSTOLIC BLOOD PRESSURE: 133 MMHG | RESPIRATION RATE: 18 BRPM | WEIGHT: 216 LBS | OXYGEN SATURATION: 100 % | WEIGHT: 216.06 LBS

## 2021-09-15 DIAGNOSIS — R13.19 ESOPHAGEAL DYSPHAGIA: Primary | ICD-10-CM

## 2021-09-15 DIAGNOSIS — Z98.84 HISTORY OF LAPAROSCOPIC ADJUSTABLE GASTRIC BANDING: ICD-10-CM

## 2021-09-15 DIAGNOSIS — K22.89 ESOPHAGEAL DILATATION: ICD-10-CM

## 2021-09-15 DIAGNOSIS — E66.09 CLASS 2 OBESITY DUE TO EXCESS CALORIES WITH BODY MASS INDEX (BMI) OF 35.0 TO 35.9 IN ADULT, UNSPECIFIED WHETHER SERIOUS COMORBIDITY PRESENT: Primary | ICD-10-CM

## 2021-09-15 DIAGNOSIS — Z71.3 DIETARY COUNSELING AND SURVEILLANCE: ICD-10-CM

## 2021-09-15 DIAGNOSIS — R13.19 OTHER DYSPHAGIA: ICD-10-CM

## 2021-09-15 DIAGNOSIS — R13.19 ESOPHAGEAL DYSPHAGIA: ICD-10-CM

## 2021-09-15 DIAGNOSIS — Z98.84 HISTORY OF LAPAROSCOPIC ADJUSTABLE GASTRIC BANDING: Primary | ICD-10-CM

## 2021-09-15 DIAGNOSIS — Z98.84 BARIATRIC SURGERY STATUS: ICD-10-CM

## 2021-09-15 DIAGNOSIS — E66.9 OBESITY (BMI 30-39.9): ICD-10-CM

## 2021-09-15 PROCEDURE — 3008F PR BODY MASS INDEX (BMI) DOCUMENTED: ICD-10-PCS | Mod: CPTII,S$GLB,, | Performed by: SURGERY

## 2021-09-15 PROCEDURE — 93010 ELECTROCARDIOGRAM REPORT: CPT | Mod: S$GLB,,, | Performed by: INTERNAL MEDICINE

## 2021-09-15 PROCEDURE — 3044F HG A1C LEVEL LT 7.0%: CPT | Mod: CPTII,S$GLB,, | Performed by: SURGERY

## 2021-09-15 PROCEDURE — 3079F PR MOST RECENT DIASTOLIC BLOOD PRESSURE 80-89 MM HG: ICD-10-PCS | Mod: CPTII,S$GLB,, | Performed by: SURGERY

## 2021-09-15 PROCEDURE — 3044F PR MOST RECENT HEMOGLOBIN A1C LEVEL <7.0%: ICD-10-PCS | Mod: CPTII,95,, | Performed by: PHYSICIAN ASSISTANT

## 2021-09-15 PROCEDURE — 99999 PR PBB SHADOW E&M-EST. PATIENT-LVL III: CPT | Mod: PBBFAC,,, | Performed by: SURGERY

## 2021-09-15 PROCEDURE — 99213 OFFICE O/P EST LOW 20 MIN: CPT | Mod: S$GLB,,, | Performed by: SURGERY

## 2021-09-15 PROCEDURE — 93010 EKG 12-LEAD: ICD-10-PCS | Mod: S$GLB,,, | Performed by: INTERNAL MEDICINE

## 2021-09-15 PROCEDURE — 3075F SYST BP GE 130 - 139MM HG: CPT | Mod: CPTII,S$GLB,, | Performed by: SURGERY

## 2021-09-15 PROCEDURE — 99999 PR PBB SHADOW E&M-EST. PATIENT-LVL III: ICD-10-PCS | Mod: PBBFAC,,, | Performed by: SURGERY

## 2021-09-15 PROCEDURE — 71046 X-RAY EXAM CHEST 2 VIEWS: CPT | Mod: TC,FY

## 2021-09-15 PROCEDURE — 1160F PR REVIEW ALL MEDS BY PRESCRIBER/CLIN PHARMACIST DOCUMENTED: ICD-10-PCS | Mod: CPTII,S$GLB,, | Performed by: SURGERY

## 2021-09-15 PROCEDURE — 93005 ELECTROCARDIOGRAM TRACING: CPT | Mod: S$GLB,,, | Performed by: PHYSICIAN ASSISTANT

## 2021-09-15 PROCEDURE — 99213 PR OFFICE/OUTPT VISIT, EST, LEVL III, 20-29 MIN: ICD-10-PCS | Mod: S$GLB,,, | Performed by: SURGERY

## 2021-09-15 PROCEDURE — 3044F HG A1C LEVEL LT 7.0%: CPT | Mod: CPTII,95,, | Performed by: PHYSICIAN ASSISTANT

## 2021-09-15 PROCEDURE — 1159F MED LIST DOCD IN RCRD: CPT | Mod: CPTII,S$GLB,, | Performed by: SURGERY

## 2021-09-15 PROCEDURE — 99499 UNLISTED E&M SERVICE: CPT | Mod: 95,,, | Performed by: PHYSICIAN ASSISTANT

## 2021-09-15 PROCEDURE — 3008F BODY MASS INDEX DOCD: CPT | Mod: CPTII,S$GLB,, | Performed by: SURGERY

## 2021-09-15 PROCEDURE — 3079F DIAST BP 80-89 MM HG: CPT | Mod: CPTII,S$GLB,, | Performed by: SURGERY

## 2021-09-15 PROCEDURE — 99499 NO LOS: ICD-10-PCS | Mod: 95,,, | Performed by: PHYSICIAN ASSISTANT

## 2021-09-15 PROCEDURE — 3044F PR MOST RECENT HEMOGLOBIN A1C LEVEL <7.0%: ICD-10-PCS | Mod: CPTII,S$GLB,, | Performed by: SURGERY

## 2021-09-15 PROCEDURE — 99999 PR PBB SHADOW E&M-EST. PATIENT-LVL I: ICD-10-PCS | Mod: PBBFAC,,, | Performed by: DIETITIAN, REGISTERED

## 2021-09-15 PROCEDURE — 3075F PR MOST RECENT SYSTOLIC BLOOD PRESS GE 130-139MM HG: ICD-10-PCS | Mod: CPTII,S$GLB,, | Performed by: SURGERY

## 2021-09-15 PROCEDURE — 99999 PR PBB SHADOW E&M-EST. PATIENT-LVL I: CPT | Mod: PBBFAC,,, | Performed by: DIETITIAN, REGISTERED

## 2021-09-15 PROCEDURE — 1159F PR MEDICATION LIST DOCUMENTED IN MEDICAL RECORD: ICD-10-PCS | Mod: CPTII,S$GLB,, | Performed by: SURGERY

## 2021-09-15 PROCEDURE — 71046 X-RAY EXAM CHEST 2 VIEWS: CPT | Mod: 26,,, | Performed by: RADIOLOGY

## 2021-09-15 PROCEDURE — 99499 UNLISTED E&M SERVICE: CPT | Mod: S$GLB,,, | Performed by: DIETITIAN, REGISTERED

## 2021-09-15 PROCEDURE — 71046 XR CHEST PA AND LATERAL: ICD-10-PCS | Mod: 26,,, | Performed by: RADIOLOGY

## 2021-09-15 PROCEDURE — 93005 EKG 12-LEAD: ICD-10-PCS | Mod: S$GLB,,, | Performed by: PHYSICIAN ASSISTANT

## 2021-09-15 PROCEDURE — 99499 NO LOS: ICD-10-PCS | Mod: S$GLB,,, | Performed by: DIETITIAN, REGISTERED

## 2021-09-15 PROCEDURE — 1160F RVW MEDS BY RX/DR IN RCRD: CPT | Mod: CPTII,S$GLB,, | Performed by: SURGERY

## 2021-09-15 RX ORDER — SODIUM CHLORIDE, SODIUM LACTATE, POTASSIUM CHLORIDE, CALCIUM CHLORIDE 600; 310; 30; 20 MG/100ML; MG/100ML; MG/100ML; MG/100ML
INJECTION, SOLUTION INTRAVENOUS CONTINUOUS
Status: CANCELLED | OUTPATIENT
Start: 2021-09-15

## 2021-09-15 RX ORDER — DEXTROMETHORPHAN/PSEUDOEPHED 2.5-7.5/.8
40 DROPS ORAL 4 TIMES DAILY PRN
Status: CANCELLED | OUTPATIENT
Start: 2021-09-15

## 2021-09-15 RX ORDER — POLYETHYLENE GLYCOL 3350 17 G/17G
17 POWDER, FOR SOLUTION ORAL DAILY
Qty: 510 G | Refills: 3 | Status: SHIPPED | OUTPATIENT
Start: 2021-09-15 | End: 2021-10-08

## 2021-09-15 RX ORDER — OMEPRAZOLE 40 MG/1
40 CAPSULE, DELAYED RELEASE ORAL EVERY MORNING
Qty: 30 CAPSULE | Refills: 2 | Status: SHIPPED | OUTPATIENT
Start: 2021-09-15 | End: 2022-06-13 | Stop reason: SDUPTHER

## 2021-09-15 RX ORDER — PANTOPRAZOLE SODIUM 40 MG/10ML
40 INJECTION, POWDER, LYOPHILIZED, FOR SOLUTION INTRAVENOUS 2 TIMES DAILY
Status: CANCELLED | OUTPATIENT
Start: 2021-09-15

## 2021-09-15 RX ORDER — ONDANSETRON 8 MG/1
8 TABLET, ORALLY DISINTEGRATING ORAL EVERY 6 HOURS PRN
Qty: 30 TABLET | Refills: 0 | Status: SHIPPED | OUTPATIENT
Start: 2021-09-15

## 2021-09-15 RX ORDER — MUPIROCIN 20 MG/G
OINTMENT TOPICAL
Status: CANCELLED | OUTPATIENT
Start: 2021-09-15

## 2021-09-15 RX ORDER — HYDROCODONE BITARTRATE AND ACETAMINOPHEN 7.5; 325 MG/15ML; MG/15ML
15 SOLUTION ORAL 4 TIMES DAILY PRN
Qty: 118 ML | Refills: 0 | Status: SHIPPED | OUTPATIENT
Start: 2021-09-15 | End: 2021-10-08

## 2021-09-15 RX ORDER — URSODIOL 500 MG/1
TABLET, FILM COATED ORAL
Qty: 90 TABLET | Refills: 1 | Status: SHIPPED | OUTPATIENT
Start: 2021-09-15

## 2021-09-15 RX ORDER — HYDROCODONE BITARTRATE AND ACETAMINOPHEN 7.5; 325 MG/15ML; MG/15ML
15 SOLUTION ORAL EVERY 4 HOURS PRN
Status: CANCELLED | OUTPATIENT
Start: 2021-09-16

## 2021-09-15 RX ORDER — HEPARIN SODIUM 5000 [USP'U]/ML
5000 INJECTION, SOLUTION INTRAVENOUS; SUBCUTANEOUS ONCE
Status: CANCELLED | OUTPATIENT
Start: 2021-09-15 | End: 2021-09-15

## 2021-09-15 RX ORDER — ENOXAPARIN SODIUM 100 MG/ML
40 INJECTION SUBCUTANEOUS NIGHTLY
Status: CANCELLED | OUTPATIENT
Start: 2021-09-15

## 2021-09-15 RX ORDER — PROMETHAZINE HYDROCHLORIDE 25 MG/1
SUPPOSITORY RECTAL
Qty: 15 SUPPOSITORY | Refills: 0 | Status: SHIPPED | OUTPATIENT
Start: 2021-09-15 | End: 2021-10-08

## 2021-09-15 RX ORDER — FAMOTIDINE 10 MG/ML
20 INJECTION INTRAVENOUS ONCE
Status: CANCELLED | OUTPATIENT
Start: 2021-09-15 | End: 2021-09-15

## 2021-09-15 RX ORDER — ACETAMINOPHEN 650 MG/20.3ML
500 LIQUID ORAL EVERY 8 HOURS
Status: CANCELLED | OUTPATIENT
Start: 2021-09-15 | End: 2021-09-16

## 2021-09-15 RX ORDER — METOCLOPRAMIDE HYDROCHLORIDE 5 MG/ML
10 INJECTION INTRAMUSCULAR; INTRAVENOUS ONCE
Status: CANCELLED | OUTPATIENT
Start: 2021-09-15 | End: 2021-09-15

## 2021-09-15 RX ORDER — SUCRALFATE 1 G/10ML
1 SUSPENSION ORAL
Qty: 3600 ML | Refills: 0 | Status: SHIPPED | OUTPATIENT
Start: 2021-09-15 | End: 2021-12-14

## 2021-09-15 RX ORDER — GABAPENTIN 250 MG/5ML
300 SOLUTION ORAL 3 TIMES DAILY
Status: CANCELLED | OUTPATIENT
Start: 2021-09-15

## 2021-09-15 RX ORDER — PROCHLORPERAZINE EDISYLATE 5 MG/ML
5 INJECTION INTRAMUSCULAR; INTRAVENOUS EVERY 6 HOURS PRN
Status: CANCELLED | OUTPATIENT
Start: 2021-09-15

## 2021-09-15 RX ORDER — SODIUM CITRATE AND CITRIC ACID MONOHYDRATE 334; 500 MG/5ML; MG/5ML
30 SOLUTION ORAL ONCE
Status: CANCELLED | OUTPATIENT
Start: 2021-09-15 | End: 2021-09-15

## 2021-09-15 RX ORDER — HYDROMORPHONE HYDROCHLORIDE 1 MG/ML
0.5 INJECTION, SOLUTION INTRAMUSCULAR; INTRAVENOUS; SUBCUTANEOUS
Status: CANCELLED | OUTPATIENT
Start: 2021-09-15

## 2021-09-15 RX ORDER — SUCRALFATE 1 G/10ML
1 SUSPENSION ORAL
Qty: 1200 ML | Refills: 1 | Status: SHIPPED | OUTPATIENT
Start: 2021-09-15 | End: 2021-09-15

## 2021-09-15 RX ORDER — LIDOCAINE HYDROCHLORIDE 10 MG/ML
1 INJECTION, SOLUTION EPIDURAL; INFILTRATION; INTRACAUDAL; PERINEURAL ONCE
Status: CANCELLED | OUTPATIENT
Start: 2021-09-15 | End: 2021-09-15

## 2021-09-15 RX ORDER — SODIUM CHLORIDE 9 MG/ML
INJECTION, SOLUTION INTRAVENOUS CONTINUOUS
Status: CANCELLED | OUTPATIENT
Start: 2021-09-15

## 2021-09-15 RX ORDER — ONDANSETRON 2 MG/ML
8 INJECTION INTRAMUSCULAR; INTRAVENOUS EVERY 6 HOURS PRN
Status: CANCELLED | OUTPATIENT
Start: 2021-09-15

## 2021-09-15 NOTE — H&P (VIEW-ONLY)
Subjective:  The patient is a 35 y.o. obese female who presents for pre op for removal of band with conversion to gastric sleeve.  She was seen by ANGELIC Truong and had her band deflated due to heartburn and difficulty swallowing.  She reports that she continues to struggles with these symptoms.  Taking Prilosec in the morning and recently started Protonix at night.      Last band fill in 2019 (.05) added to band      UGI:  8/2021  Impression: Significant gastric stomal stenosis with distal esophageal dilation.       EGD:  Dilation of entire esophagus, fluid in esophagus, moderate esophageal stenosis.  All workup has been reviewed in clinic today and there is nothing on the review that would prevent us from proceeding with surgery.  All questions were answered in clinic today prior to leaving.  Body mass index is 34.87 kg/m².       Patient Active Problem List    Diagnosis Date Noted    Dysphagia 08/27/2021    Wears contact lenses 08/16/2021    Pain in both knees 01/06/2021    Seborrheic dermatitis of scalp 06/23/2020    IUD (intrauterine device) in place 11/27/2019    Perennial allergic rhinitis 11/27/2019    BMI 32.0-32.9,adult 11/27/2019    Vitamin D insufficiency 11/27/2019    Current use of proton pump inhibitor 11/27/2019    NSAID induced gastritis 03/07/2018    Bariatric surgery status 05/25/2015     Past Medical History:   Diagnosis Date    Bariatric surgery status 5/25/2015    BMI 32.0-32.9,adult 11/27/2019    Current use of proton pump inhibitor 11/27/2019    IUD (intrauterine device) in place 11/27/2019    Miscarriage     NSAID induced gastritis 3/7/2018    Perennial allergic rhinitis 11/27/2019    Seborrheic dermatitis of scalp 6/23/2020    Vitamin D insufficiency 11/27/2019      Past Surgical History:   Procedure Laterality Date    breast reduction      CARPAL TUNNEL RELEASE Right 01/12/2018    CARPAL TUNNEL RELEASE Left 10/19/2018    Procedure: RELEASE, CARPAL TUNNEL;  Surgeon:  "Michael Agrawal Jr., MD;  [facility];  Service: Plastics;  Laterality: Left;    COSMETIC SURGERY      ESOPHAGOGASTRODUODENOSCOPY N/A 8/27/2021    Procedure: EGD (ESOPHAGOGASTRODUODENOSCOPY);  Surgeon: Quinn Jensen MD;  Location: [address]);  Service: Endoscopy;  Laterality: N/A;  BMI 34.67-urgent case   Possible slipped band        will be out of town until day before-needs rapid, arrival time 8:30-GT    hematoma surgery      LAPAROSCOPIC GASTRIC BANDING        (Not in a hospital admission)    Review of patient's allergies indicates:  No Known Allergies   Social History     Tobacco Use    Smoking status: Never Smoker    Smokeless tobacco: Never Used   Substance Use Topics    Alcohol use: Yes     Comment: occasional.      Family History   Problem Relation Age of Onset    Diabetes Mother     Hypertension Mother     Hyperlipidemia Mother     Glaucoma Mother     Arthritis Father     Hypertension Father     Diabetes Maternal Grandmother     Amblyopia Neg Hx     Blindness Neg Hx     Cancer Neg Hx     Cataracts Neg Hx     Macular degeneration Neg Hx     Retinal detachment Neg Hx     Strabismus Neg Hx     Stroke Neg Hx     Thyroid disease Neg Hx         Review of Systems  Constitutional: negative for anorexia, chills and fatigue  Eyes: negative for icterus, irritation and redness  Respiratory: negative for cough and dyspnea on exertion  Cardiovascular: negative for chest pain and chest pressure/discomfort  Gastrointestinal: negative for abdominal pain, change in bowel habits, constipation and diarrhea  Musculoskeletal:negative for arthralgias and back pain  Neurological: negative for coordination problems and dizziness  Behavioral/Psych: negative for anxiety and bad mood    Objective:  Vital signs in last 24 hours:  Vitals:    09/15/21 1406   BP: 133/80   Pulse: 76   Resp: 18   SpO2: 100%   Weight: 98 kg (216 lb 0.8 oz)   Height: 5' 6" (1.676 m)          Weight History Current " Weight Total Weight Loss   6/15/2021 210.5 -210.5   8/10/2021 214.8 -214.8       General appearance: alert, appears stated age and cooperative  Head: Normocephalic, without obvious abnormality, atraumatic  Eyes: negative findings: lids and lashes normal and conjunctivae and sclerae normal  Lungs: non labored breathing  Heart: regular rate and rhythm  Abdomen: soft, non-tender  Extremities: extremities normal, atraumatic, no cyanosis or edema  Skin: Skin color, texture, turgor normal. No rashes or lesions  Neurologic: Grossly normal    Data Review:  Psych: Cleared  Nutrition: Cleared  PCP: Cleared  CXR: WNL  EKG: WNL  Labs: No abnormalities that would prevent proceeding with surgery.    Assessment/Plan:  Morbid obesity with failure of conservative therapy.    The patient was informed that risks include, but are not limited to: death, leak, obstruction, bleeding, and sepsis. Any of these could require further surgery. Other risks include DVT, PE, pneumonia, wound dehiscence, hernia, wound infection, the need for dilatations and the inability to lose appropriate weight and keep it off.     We discussed that our goal is to ameliorate her medical problems and not to obtain a specific body mass index. She understands the risks and benefits and wishes to proceed with the procedure. She has signed a consent form.       Abhilash Cullen MD

## 2021-09-16 ENCOUNTER — TELEPHONE (OUTPATIENT)
Dept: BARIATRICS | Facility: CLINIC | Age: 36
End: 2021-09-16

## 2021-09-17 DIAGNOSIS — R94.31 ABNORMAL EKG: Primary | ICD-10-CM

## 2021-09-20 ENCOUNTER — TELEPHONE (OUTPATIENT)
Dept: BARIATRICS | Facility: CLINIC | Age: 36
End: 2021-09-20

## 2021-09-21 ENCOUNTER — TELEPHONE (OUTPATIENT)
Dept: BARIATRICS | Facility: CLINIC | Age: 36
End: 2021-09-21

## 2021-09-22 ENCOUNTER — TELEPHONE (OUTPATIENT)
Dept: BARIATRICS | Facility: CLINIC | Age: 36
End: 2021-09-22

## 2021-09-22 DIAGNOSIS — R13.19 ESOPHAGEAL DYSPHAGIA: Primary | ICD-10-CM

## 2021-09-22 DIAGNOSIS — K22.89 ESOPHAGEAL DILATATION: ICD-10-CM

## 2021-09-22 DIAGNOSIS — K21.9 GASTROESOPHAGEAL REFLUX DISEASE, UNSPECIFIED WHETHER ESOPHAGITIS PRESENT: ICD-10-CM

## 2021-09-22 DIAGNOSIS — K95.09 OTHER COMPLICATIONS OF GASTRIC BAND PROCEDURE: ICD-10-CM

## 2021-09-22 DIAGNOSIS — K22.89 DILATION OF ESOPHAGUS: ICD-10-CM

## 2021-09-27 ENCOUNTER — LAB VISIT (OUTPATIENT)
Dept: SPORTS MEDICINE | Facility: CLINIC | Age: 36
DRG: 328 | End: 2021-09-27
Payer: COMMERCIAL

## 2021-09-27 DIAGNOSIS — R13.19 OTHER DYSPHAGIA: ICD-10-CM

## 2021-09-27 DIAGNOSIS — E66.09 CLASS 2 OBESITY DUE TO EXCESS CALORIES WITH BODY MASS INDEX (BMI) OF 35.0 TO 35.9 IN ADULT, UNSPECIFIED WHETHER SERIOUS COMORBIDITY PRESENT: ICD-10-CM

## 2021-09-27 DIAGNOSIS — Z98.84 BARIATRIC SURGERY STATUS: ICD-10-CM

## 2021-09-27 LAB
SARS-COV-2 RNA RESP QL NAA+PROBE: NOT DETECTED
SARS-COV-2- CYCLE NUMBER: NORMAL

## 2021-09-27 PROCEDURE — U0003 INFECTIOUS AGENT DETECTION BY NUCLEIC ACID (DNA OR RNA); SEVERE ACUTE RESPIRATORY SYNDROME CORONAVIRUS 2 (SARS-COV-2) (CORONAVIRUS DISEASE [COVID-19]), AMPLIFIED PROBE TECHNIQUE, MAKING USE OF HIGH THROUGHPUT TECHNOLOGIES AS DESCRIBED BY CMS-2020-01-R: HCPCS | Performed by: SURGERY

## 2021-09-27 PROCEDURE — U0005 INFEC AGEN DETEC AMPLI PROBE: HCPCS | Performed by: SURGERY

## 2021-09-29 ENCOUNTER — TELEPHONE (OUTPATIENT)
Dept: BARIATRICS | Facility: CLINIC | Age: 36
End: 2021-09-29

## 2021-09-29 ENCOUNTER — ANESTHESIA EVENT (OUTPATIENT)
Dept: SURGERY | Facility: HOSPITAL | Age: 36
DRG: 328 | End: 2021-09-29
Payer: COMMERCIAL

## 2021-09-29 NOTE — PRE-PROCEDURE INSTRUCTIONS
PREOP INSTRUCTIONS:No solid food ,milk or milk products for 8 hours prior to procedure.Clear liquids are allowed up to 2 hours before procedure.Clear liquids are:water,apple juice,gatorade & powerade.Instructed to follow the surgeon's instructions if they differ from these.Shower instructions as well as directions to the Surgery Center were given.Encouraged to wear loose fitting,comfortable clothing.Medication instructions for pm prior to and am of procedure reviewed.Instructed to avoid taking vitamins,supplements,aspirin and ibuprofen the morning of surgery.      Patient denies any side effects or issues with anesthesia or sedation.

## 2021-09-30 ENCOUNTER — ANESTHESIA (OUTPATIENT)
Dept: SURGERY | Facility: HOSPITAL | Age: 36
DRG: 328 | End: 2021-09-30
Payer: COMMERCIAL

## 2021-09-30 ENCOUNTER — HOSPITAL ENCOUNTER (OUTPATIENT)
Facility: HOSPITAL | Age: 36
Discharge: HOME | DRG: 328 | End: 2021-09-30
Attending: SURGERY | Admitting: SURGERY
Payer: COMMERCIAL

## 2021-09-30 VITALS
SYSTOLIC BLOOD PRESSURE: 107 MMHG | HEART RATE: 91 BPM | DIASTOLIC BLOOD PRESSURE: 64 MMHG | RESPIRATION RATE: 20 BRPM | TEMPERATURE: 98 F | OXYGEN SATURATION: 100 %

## 2021-09-30 DIAGNOSIS — E66.09 CLASS 2 OBESITY DUE TO EXCESS CALORIES WITH BODY MASS INDEX (BMI) OF 35.0 TO 35.9 IN ADULT, UNSPECIFIED WHETHER SERIOUS COMORBIDITY PRESENT: ICD-10-CM

## 2021-09-30 PROBLEM — E66.812 CLASS 2 OBESITY DUE TO EXCESS CALORIES WITH BODY MASS INDEX (BMI) OF 35.0 TO 35.9 IN ADULT: Status: ACTIVE | Noted: 2021-09-30

## 2021-09-30 LAB
B-HCG UR QL: NEGATIVE
CTP QC/QA: YES

## 2021-09-30 PROCEDURE — 63600175 PHARM REV CODE 636 W HCPCS: Performed by: STUDENT IN AN ORGANIZED HEALTH CARE EDUCATION/TRAINING PROGRAM

## 2021-09-30 PROCEDURE — 88300 SURGICAL PATH GROSS: CPT | Mod: 26,,, | Performed by: PATHOLOGY

## 2021-09-30 PROCEDURE — 25000003 PHARM REV CODE 250: Performed by: STUDENT IN AN ORGANIZED HEALTH CARE EDUCATION/TRAINING PROGRAM

## 2021-09-30 PROCEDURE — 37000008 HC ANESTHESIA 1ST 15 MINUTES: Performed by: SURGERY

## 2021-09-30 PROCEDURE — 36000706: Performed by: SURGERY

## 2021-09-30 PROCEDURE — 36000709 HC OR TIME LEV III EA ADD 15 MIN: Performed by: SURGERY

## 2021-09-30 PROCEDURE — 43774 PR LAP, REMOVE ADJUST GAST RESTRICT DEVICE/PORT: ICD-10-PCS | Mod: ,,, | Performed by: SURGERY

## 2021-09-30 PROCEDURE — 27201423 OPTIME MED/SURG SUP & DEVICES STERILE SUPPLY: Performed by: SURGERY

## 2021-09-30 PROCEDURE — 43774 LAP RMVL GASTR ADJ ALL PARTS: CPT | Mod: ,,, | Performed by: SURGERY

## 2021-09-30 PROCEDURE — 63600175 PHARM REV CODE 636 W HCPCS: Performed by: SURGERY

## 2021-09-30 PROCEDURE — 88300 PR  SURG PATH,GROSS,LEVEL I: ICD-10-PCS | Mod: 26,,, | Performed by: PATHOLOGY

## 2021-09-30 PROCEDURE — D9220A PRA ANESTHESIA: Mod: ,,, | Performed by: ANESTHESIOLOGY

## 2021-09-30 PROCEDURE — 88300 SURGICAL PATH GROSS: CPT | Performed by: PATHOLOGY

## 2021-09-30 PROCEDURE — D9220A PRA ANESTHESIA: ICD-10-PCS | Mod: ,,, | Performed by: ANESTHESIOLOGY

## 2021-09-30 PROCEDURE — 37000009 HC ANESTHESIA EA ADD 15 MINS: Performed by: SURGERY

## 2021-09-30 PROCEDURE — 36000708 HC OR TIME LEV III 1ST 15 MIN: Performed by: SURGERY

## 2021-09-30 PROCEDURE — 36000707: Performed by: SURGERY

## 2021-09-30 PROCEDURE — 71000016 HC POSTOP RECOV ADDL HR: Performed by: SURGERY

## 2021-09-30 PROCEDURE — 25000003 PHARM REV CODE 250: Performed by: SURGERY

## 2021-09-30 PROCEDURE — G0378 HOSPITAL OBSERVATION PER HR: HCPCS

## 2021-09-30 PROCEDURE — G0379 DIRECT REFER HOSPITAL OBSERV: HCPCS

## 2021-09-30 PROCEDURE — 71000044 HC DOSC ROUTINE RECOVERY FIRST HOUR: Performed by: SURGERY

## 2021-09-30 PROCEDURE — 12000002 HC ACUTE/MED SURGE SEMI-PRIVATE ROOM

## 2021-09-30 PROCEDURE — 71000015 HC POSTOP RECOV 1ST HR: Performed by: SURGERY

## 2021-09-30 PROCEDURE — 25000003 PHARM REV CODE 250

## 2021-09-30 PROCEDURE — 81025 URINE PREGNANCY TEST: CPT | Performed by: SURGERY

## 2021-09-30 RX ORDER — ONDANSETRON 2 MG/ML
INJECTION INTRAMUSCULAR; INTRAVENOUS
Status: DISCONTINUED | OUTPATIENT
Start: 2021-09-30 | End: 2021-09-30

## 2021-09-30 RX ORDER — DEXAMETHASONE SODIUM PHOSPHATE 4 MG/ML
INJECTION, SOLUTION INTRA-ARTICULAR; INTRALESIONAL; INTRAMUSCULAR; INTRAVENOUS; SOFT TISSUE
Status: DISCONTINUED | OUTPATIENT
Start: 2021-09-30 | End: 2021-09-30

## 2021-09-30 RX ORDER — OXYCODONE AND ACETAMINOPHEN 5; 325 MG/1; MG/1
1 TABLET ORAL ONCE
Status: COMPLETED | OUTPATIENT
Start: 2021-09-30 | End: 2021-09-30

## 2021-09-30 RX ORDER — CEFAZOLIN SODIUM 1 G/3ML
INJECTION, POWDER, FOR SOLUTION INTRAMUSCULAR; INTRAVENOUS
Status: DISCONTINUED | OUTPATIENT
Start: 2021-09-30 | End: 2021-09-30

## 2021-09-30 RX ORDER — PROCHLORPERAZINE EDISYLATE 5 MG/ML
5 INJECTION INTRAMUSCULAR; INTRAVENOUS EVERY 30 MIN PRN
Status: DISCONTINUED | OUTPATIENT
Start: 2021-09-30 | End: 2021-09-30 | Stop reason: HOSPADM

## 2021-09-30 RX ORDER — BUPIVACAINE HYDROCHLORIDE 2.5 MG/ML
INJECTION, SOLUTION EPIDURAL; INFILTRATION; INTRACAUDAL
Status: DISCONTINUED | OUTPATIENT
Start: 2021-09-30 | End: 2021-09-30 | Stop reason: HOSPADM

## 2021-09-30 RX ORDER — MUPIROCIN 20 MG/G
OINTMENT TOPICAL
Status: DISCONTINUED | OUTPATIENT
Start: 2021-09-30 | End: 2021-09-30 | Stop reason: HOSPADM

## 2021-09-30 RX ORDER — OXYCODONE AND ACETAMINOPHEN 5; 325 MG/1; MG/1
1 TABLET ORAL EVERY 4 HOURS PRN
Qty: 10 TABLET | Refills: 0 | Status: SHIPPED | OUTPATIENT
Start: 2021-09-30 | End: 2021-10-08

## 2021-09-30 RX ORDER — HEPARIN SODIUM 5000 [USP'U]/ML
5000 INJECTION, SOLUTION INTRAVENOUS; SUBCUTANEOUS ONCE
Status: COMPLETED | OUTPATIENT
Start: 2021-09-30 | End: 2021-09-30

## 2021-09-30 RX ORDER — PROPOFOL 10 MG/ML
VIAL (ML) INTRAVENOUS
Status: DISCONTINUED | OUTPATIENT
Start: 2021-09-30 | End: 2021-09-30

## 2021-09-30 RX ORDER — FENTANYL CITRATE 50 UG/ML
INJECTION, SOLUTION INTRAMUSCULAR; INTRAVENOUS
Status: DISCONTINUED | OUTPATIENT
Start: 2021-09-30 | End: 2021-09-30

## 2021-09-30 RX ORDER — HALOPERIDOL 5 MG/ML
0.5 INJECTION INTRAMUSCULAR EVERY 10 MIN PRN
Status: DISCONTINUED | OUTPATIENT
Start: 2021-09-30 | End: 2021-09-30 | Stop reason: HOSPADM

## 2021-09-30 RX ORDER — MIDAZOLAM HYDROCHLORIDE 1 MG/ML
INJECTION, SOLUTION INTRAMUSCULAR; INTRAVENOUS
Status: DISCONTINUED | OUTPATIENT
Start: 2021-09-30 | End: 2021-09-30

## 2021-09-30 RX ORDER — ACETAMINOPHEN 10 MG/ML
INJECTION, SOLUTION INTRAVENOUS
Status: DISCONTINUED | OUTPATIENT
Start: 2021-09-30 | End: 2021-09-30

## 2021-09-30 RX ORDER — METOCLOPRAMIDE HYDROCHLORIDE 5 MG/ML
10 INJECTION INTRAMUSCULAR; INTRAVENOUS ONCE
Status: COMPLETED | OUTPATIENT
Start: 2021-09-30 | End: 2021-09-30

## 2021-09-30 RX ORDER — SUCCINYLCHOLINE CHLORIDE 20 MG/ML
INJECTION INTRAMUSCULAR; INTRAVENOUS
Status: DISCONTINUED | OUTPATIENT
Start: 2021-09-30 | End: 2021-09-30

## 2021-09-30 RX ORDER — ROCURONIUM BROMIDE 10 MG/ML
INJECTION, SOLUTION INTRAVENOUS
Status: DISCONTINUED | OUTPATIENT
Start: 2021-09-30 | End: 2021-09-30

## 2021-09-30 RX ORDER — SODIUM CHLORIDE 0.9 % (FLUSH) 0.9 %
10 SYRINGE (ML) INJECTION
Status: DISCONTINUED | OUTPATIENT
Start: 2021-09-30 | End: 2021-09-30 | Stop reason: HOSPADM

## 2021-09-30 RX ORDER — LIDOCAINE HYDROCHLORIDE 10 MG/ML
1 INJECTION, SOLUTION EPIDURAL; INFILTRATION; INTRACAUDAL; PERINEURAL ONCE
Status: COMPLETED | OUTPATIENT
Start: 2021-09-30 | End: 2021-09-30

## 2021-09-30 RX ORDER — SODIUM CHLORIDE 9 MG/ML
INJECTION, SOLUTION INTRAVENOUS CONTINUOUS
Status: DISCONTINUED | OUTPATIENT
Start: 2021-09-30 | End: 2021-09-30 | Stop reason: HOSPADM

## 2021-09-30 RX ORDER — FENTANYL CITRATE 50 UG/ML
25 INJECTION, SOLUTION INTRAMUSCULAR; INTRAVENOUS EVERY 5 MIN PRN
Status: DISCONTINUED | OUTPATIENT
Start: 2021-09-30 | End: 2021-09-30 | Stop reason: HOSPADM

## 2021-09-30 RX ORDER — LIDOCAINE HYDROCHLORIDE 20 MG/ML
INJECTION INTRAVENOUS
Status: DISCONTINUED | OUTPATIENT
Start: 2021-09-30 | End: 2021-09-30

## 2021-09-30 RX ORDER — SODIUM CITRATE AND CITRIC ACID MONOHYDRATE 334; 500 MG/5ML; MG/5ML
30 SOLUTION ORAL ONCE
Status: COMPLETED | OUTPATIENT
Start: 2021-09-30 | End: 2021-09-30

## 2021-09-30 RX ORDER — FAMOTIDINE 10 MG/ML
20 INJECTION INTRAVENOUS ONCE
Status: COMPLETED | OUTPATIENT
Start: 2021-09-30 | End: 2021-09-30

## 2021-09-30 RX ORDER — SCOLOPAMINE TRANSDERMAL SYSTEM 1 MG/1
1 PATCH, EXTENDED RELEASE TRANSDERMAL
Status: DISCONTINUED | OUTPATIENT
Start: 2021-09-30 | End: 2021-09-30 | Stop reason: HOSPADM

## 2021-09-30 RX ADMIN — DEXAMETHASONE SODIUM PHOSPHATE 4 MG: 4 INJECTION, SOLUTION INTRAMUSCULAR; INTRAVENOUS at 07:09

## 2021-09-30 RX ADMIN — PROPOFOL 200 MG: 10 INJECTION, EMULSION INTRAVENOUS at 07:09

## 2021-09-30 RX ADMIN — HEPARIN SODIUM 5000 UNITS: 5000 INJECTION INTRAVENOUS; SUBCUTANEOUS at 06:09

## 2021-09-30 RX ADMIN — SODIUM CHLORIDE: 0.9 INJECTION, SOLUTION INTRAVENOUS at 07:09

## 2021-09-30 RX ADMIN — MIDAZOLAM HYDROCHLORIDE 2 MG: 1 INJECTION, SOLUTION INTRAMUSCULAR; INTRAVENOUS at 07:09

## 2021-09-30 RX ADMIN — METOCLOPRAMIDE 10 MG: 5 INJECTION, SOLUTION INTRAMUSCULAR; INTRAVENOUS at 06:09

## 2021-09-30 RX ADMIN — ROCURONIUM BROMIDE 10 MG: 10 INJECTION, SOLUTION INTRAVENOUS at 07:09

## 2021-09-30 RX ADMIN — SODIUM CITRATE AND CITRIC ACID MONOHYDRATE 30 ML: 500; 334 SOLUTION ORAL at 06:09

## 2021-09-30 RX ADMIN — MUPIROCIN: 20 OINTMENT TOPICAL at 06:09

## 2021-09-30 RX ADMIN — ACETAMINOPHEN 1000 MG: 10 INJECTION INTRAVENOUS at 07:09

## 2021-09-30 RX ADMIN — LIDOCAINE HYDROCHLORIDE 100 MG: 20 INJECTION, SOLUTION INTRAVENOUS at 07:09

## 2021-09-30 RX ADMIN — LIDOCAINE HYDROCHLORIDE 0.1 MG: 10 INJECTION, SOLUTION EPIDURAL; INFILTRATION; INTRACAUDAL at 06:09

## 2021-09-30 RX ADMIN — SCOPALAMINE 1 PATCH: 1 PATCH, EXTENDED RELEASE TRANSDERMAL at 06:09

## 2021-09-30 RX ADMIN — SODIUM CHLORIDE: 9 INJECTION, SOLUTION INTRAVENOUS at 06:09

## 2021-09-30 RX ADMIN — SUCCINYLCHOLINE CHLORIDE 200 MG: 20 INJECTION, SOLUTION INTRAMUSCULAR; INTRAVENOUS at 07:09

## 2021-09-30 RX ADMIN — CEFAZOLIN 3 G: 330 INJECTION, POWDER, FOR SOLUTION INTRAMUSCULAR; INTRAVENOUS at 07:09

## 2021-09-30 RX ADMIN — FENTANYL CITRATE 25 MCG: 50 INJECTION INTRAMUSCULAR; INTRAVENOUS at 09:09

## 2021-09-30 RX ADMIN — OXYCODONE HYDROCHLORIDE AND ACETAMINOPHEN 1 TABLET: 5; 325 TABLET ORAL at 09:09

## 2021-09-30 RX ADMIN — ONDANSETRON 4 MG: 2 INJECTION INTRAMUSCULAR; INTRAVENOUS at 08:09

## 2021-09-30 RX ADMIN — FAMOTIDINE 20 MG: 10 INJECTION INTRAVENOUS at 06:09

## 2021-09-30 RX ADMIN — ROCURONIUM BROMIDE 40 MG: 10 INJECTION, SOLUTION INTRAVENOUS at 07:09

## 2021-09-30 RX ADMIN — SUGAMMADEX 200 MG: 100 INJECTION, SOLUTION INTRAVENOUS at 08:09

## 2021-09-30 RX ADMIN — FENTANYL CITRATE 100 MCG: 50 INJECTION, SOLUTION INTRAMUSCULAR; INTRAVENOUS at 07:09

## 2021-09-30 NOTE — DISCHARGE SUMMARY
[facility]  Brief Operative Note    Surgery Date: 9/30/2021     Surgeon(s) and Role:     * Abhilash Cullen Jr., MD - Primary     * Sonny Fowler MD - Resident - Assisting        Pre-op Diagnosis:  Class 2 obesity due to excess calories with body mass index (BMI) of 35.0 to 35.9 in adult, unspecified whether serious comorbidity present [E66.09, Z68.35]  Other dysphagia [R13.19]  Bariatric surgery status [Z98.84]    Post-op Diagnosis:  Post-Op Diagnosis Codes:     * Class 2 obesity due to excess calories with body mass index (BMI) of 35.0 to 35.9 in adult, unspecified whether serious comorbidity present [E66.09, Z68.35]     * Other dysphagia [R13.19]     * Bariatric surgery status [Z98.84]    Procedure(s) (LRB):  REMOVAL, GASTRIC RESTRICTIVE DEVICE (N/A)    Anesthesia: General    Operative Findings: Removal of gastric band and gastric band port    Estimated Blood Loss: Minimal         Specimens:   Specimen (24h ago, onward)             Start     Ordered    09/30/21 0815  Specimen to Pathology, Surgery General Surgery  Once        Comments: Pre-op Diagnosis: Class 2 obesity due to excess calories with body mass index (BMI) of 35.0 to 35.9 in adult, unspecified whether serious comorbidity present [E66.09, Z68.35]  Other dysphagia [R13.19]  Bariatric surgery status [Z98.84]      Procedure(s):  REMOVAL, GASTRIC RESTRICTIVE DEVICE     Number of specimens: 1    Name of specimens:   1.) Explanted lap band (Gross ID)   References:&nbsp;&nbsp;&nbsp;&nbsp;Click here for ordering Quick Tip   Question:  Procedure Type:  Answer:  General Surgery    09/30/21 0816                  Discharge Note    OUTCOME: Patient tolerated treatment/procedure well without complication and is now ready for discharge.    DISPOSITION: Home or Self Care    FINAL DIAGNOSIS:  Class 2 obesity due to excess calories with body mass index (BMI) of 35.0 to 35.9 in adult    FOLLOWUP: In clinic    DISCHARGE INSTRUCTIONS:    Discharge  Procedure Orders   Call MD for:  temperature >100.4     Call MD for:  persistent nausea and vomiting     Call MD for:  severe uncontrolled pain     Call MD for:  difficulty breathing, headache or visual disturbances     Call MD for:  redness, tenderness, or signs of infection (pain, swelling, redness, odor or green/yellow discharge around incision site)     Call MD for:  hives     Call MD for:  persistent dizziness or light-headedness     Call MD for:  extreme fatigue     Leave dressing on - Keep it clean, dry, and intact until clinic visit

## 2021-09-30 NOTE — OP NOTE
DATE OF PROCEDURE: 09/30/2021   SERVICE: Bariatric Surgery.   Surgeon(s) and Role:     * Abhilash Cullen Jr., MD - Primary     * Sonny Fowler MD - Resident - Assisting  PREOPERATIVE DIAGNOSES: Gastric Band Intolerance  POSTOPERATIVE DIAGNOSES:Same  PROCEDURE: Laparoscopic Removal of Gastric Band  ANESTHESIA: General endotracheal and local.   DESCRIPTION OF PROCEDURE:  Patient was taken the operating room placed under general anesthesia and prepped and draped in sterile fashion.  At this time incision was made in the medial aspect of the patient's previous injection port site in the left upper quadrant.  This was a 12 mm incision using Optiview trocar intra-abdominal access was obtained without difficulty.  Pneumoperitoneum was then obtained further ports were then placed including bilateral anterior axillary subcostal 5 mm ports, a midclavicular right-sided subcostal port, and a 15 mm port approximately 15 cm from xiphoid just to the right of midline.  At this time the patient was placed in steep reverse Trendelenburg and liver retractor was placed.  Any adhesions around the buckle of the band were taken with Harmonic scalpel and the buckle was easily exposed.  The the buckle was then released and we were able to remove the band from around the stomach with ease.  Just prior to this we had opened the capsule anteriorly with Harmonic scalp on order to release the band.  The catheter to the band was then cut and the band was removed from the 15 mm port site.  We inspected the stomach and appeared to be in good condition with no issues or abnormalities.  We then proceeded with removal of the injection port.  The patient was laid flat and the incision over the initial port site was extended to allow for removal of the injection port.  Using electrocautery we carried dissection down to the port capsule and opened the port capsule.  The injection port was then able to be grass and was removed by releasing all  circumferential sutures.  This was then removed and passed off the table.  We ensured that we had all of the catheter as well.  At this time the area was irrigated copiously.  We turned attention back to laparoscopic view inspected stomach 1 further time no abnormalities were noted.  We then removed the 15 mm port and closed the fascia of this incision with a 0 Vicryl suture in figure-of-eight fashion using suture Passer device under direct visualization.  At this time all ports were removed under direct visualization.  The incision for the injection port was irrigated copiously and closed in 2 layers with 3-0 Vicryl interrupted sutures and 4-0 Monocryl subcuticular sutures.  The 15 mm port site was also irrigated and closed with 4-0 Monocryl suture after the 0 Vicryl suture which had been placed was tied down.  The 3 5 mm ports were also closed with 4 Monocryl suture in subcuticular fashion.  Mastisol and Steri-Strips were placed.  The patient was allowed to wake from general anesthesia and transferred to bed for transport to recovery.  COMPLICATIONS: None.   SPONGE COUNT: Correct.   BLOOD LOSS:5 mL.   FLUIDS: Per Anesthesia.   BLOOD GIVEN: None.   DRAINS: None.   SPECIMENS: Gastric Band  CONDITION OF THE PATIENT: Good.   I was present for the entire procedure.

## 2021-09-30 NOTE — INTERVAL H&P NOTE
The patient has been examined and the H&P has been reviewed:    I concur with the findings and no changes have occurred since H&P was written.    Surgery risks, benefits and alternative options discussed and understood by patient/family.          Active Hospital Problems    Diagnosis  POA    Class 2 obesity due to excess calories with body mass index (BMI) of 35.0 to 35.9 in adult [E66.09, Z68.35]  Not Applicable      Resolved Hospital Problems   No resolved problems to display.

## 2021-09-30 NOTE — DISCHARGE INSTRUCTIONS
Discharge Instructions: Caring for Your Incision  You are going home with stitches (sutures), surgical staples, special strips of surgical tape called Steri-Strips, or surgical skin glue. One of these items was used to close your incision, help stop bleeding, and speed healing. Follow the tips on this sheet to help your incision heal.  Home care  · Always wash your hands before touching your incision.  · Keep your incision clean and dry.  · Avoid doing things that could cause dirt or sweat to get on your incision.  · Dont pick at scabs. They help protect the wound.  · Keep your incision out of water.  · Take a sponge bath to avoid getting your incision wet, unless your healthcare provider tells you otherwise.  · Ask your provider when can you take a shower or bathe.  · Ask your provider about the best way to keep your incision dry when bathing or showering.  · Pat sutures dry if they get wet. Dont rub.  · Leave the dressing (bandage) in place until you are told to remove it or change it. Change it only as directed, using clean hands.  · After the first 12 hours, change your dressing every 24 hours, or as directed by your healthcare provider.  · Change your dressing if it gets wet or soiled.  Care for specific closures  Follow these guidelines unless your child's healthcare provider tells you otherwise:  · Sutures or staples. Once you no longer need to keep these dry, clean the incision or wound daily. First remove the bandage using clean hands. Then wash the area gently with soap and warm water. Use a wet cotton swab to loosen and remove any blood or crust that forms. After cleaning, put a thin layer of antibiotic ointment on. Then put on a new bandage.  · Skin glue. Dont put liquid, ointment, or cream on your incision or wound while the glue is in place. Avoid activities that cause heavy sweating. Protect the incision or wound from sunlight. Do not scratch, rub, or pick at the glue. Do not put tape directly   [facility] - Rheumatology Clinic Visit     Daniel He MRN# [id number]   YOB: 1967      Primary care provider: Melina Mckay          Assessment and Plan:   #1 Nodular high -titer ACPA and RF positive (> 600) EROSIVE Rheumatoid arthritis diagnosed around 2000  #3 Methotrexate use until about 2010  #4 Chronic enbrel monotherapy for RA  #6 Past h/o tobacco smoking - has tried chantix in past; - quit Jan 2015 at age 48  #7 H/o precancerous cervical lesion; s/p hysterectomy  #8 Osteoporosis- last DEXA 2011; currently on vitamin D, calcium; DEXA 2016- osteopenia    She has been on clinical remission for the past close to 4 years on enbrel monotherapy. Reduced enbrel to every 10 days in 2018. If flaring up with RA, then increase back to every 7 days.     Repeat xrays of hands and feet 2/19. No indication for adding any DMARD for combination therapy at this time as she is in perfect remission even with enbrel monotherapy.    Annual derm visits for skin cancer screening- recommended.     - Smoking cessation successfully in Jan 2015.   - Calcium 1200mg PO day and vitamin D 800 IU daily. Fosamax 70 mg PO weekly. Recheck DEXA. Ordered.     Wt Readings from Last 4 Encounters:   03/04/19 62.1 kg (137 lb)   02/26/18 63 kg (138 lb 12.8 oz)   02/23/17 60.8 kg (134 lb)   02/04/16 71 kg (156 lb 9.6 oz)       Most Recent Immunizations   Administered Date(s) Administered     Influenza (IIV3) PF 10/01/2013     Influenza Vaccine, 3 YRS +, IM (QUADRIVALENT W/PRESERVATIVES) 02/04/2016     Pneumo Conj 13-V (2010&after) 02/04/2016     Pneumococcal 23 valent 02/23/2017      Orders Placed This Encounter   Procedures     Dexa hip/pelvis/spine     CBC with platelets differential     AST     ALT     Creatinine     CRP inflammation       Medications Discontinued During This Encounter   Medication Reason     Etanercept (ENBREL SURECLICK) 50 MG/ML autoinjector Reorder     Current Outpatient Medications   Medication Sig Dispense Refill      Alendronate Sodium (FOSAMAX PO) Take 70 mg by mouth once a week       calcium-vitamin D (CALTRATE) 600-400 MG-UNIT per tablet Take 1 tablet by mouth 2 times daily 120 tablet 3     etanercept (ENBREL SURECLICK) 50 MG/ML autoinjector Inject 50 mg Subcutaneous See Admin Instructions Every 10 days. Hold for signs of infection, and seek medical attention. 1 kit 6     FIBER PO        sertraline (ZOLOFT) 50 MG tablet Take 50 mg by mouth daily         Greg Merida MD          Active Problem List:     Patient Active Problem List    Diagnosis Date Noted     Rheumatoid arthritis involving multiple joints (H) 02/04/2016     Priority: Medium            History of Present Illness:     Chief Complaint   Patient presents with     RECHECK     Ms. 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  She presents to clinic today to establish care with a new rheumatologist.  Her previous rheumatologist was Dr. Jovel and was last seen 9 months ago.  She was initially treated with methotrexate and prednisone for her RA though stopped her methotrexate about 4 years ago. Reason for discontinuation: GI side effects.  Enbrel was initiated 8 years ago.  H/o rheumatoid nodule removed surgically in the past from dorsal aspect of R 2nd finger.  She reports that her RA is under control with daily morning stiffness that lasts approximately 15 minutes.  Her only real complaint today is fatigue, which she believes has gotten worse in the past year.      +Fatigue  +Occasional myalgias/arthralgias  +Family hx of gout  +photosensitivity - long history of sensitive skin but worsened since starting RA medications  +constipation - currently managed on benefiber and probiotics  Current smoker - tried quitting multiple times.   H/o precancerous cervical lesion s/p hysterectomy  Has h/o osteoporosis    No h/o weight loss, loss of appetite, fevers, chills, rash, night  over the glue. The glue should peel off within 5 to 10 days.  · Surgical tape. Keep your incision or wound dry. If it gets wet, blot the area dry with a clean towel. Surgical tape usually falls off within 7 to 10 days. If it has not fallen off after 10 days, contact your healthcare provider before taking it off yourself. If you are told to remove the tape, put mineral oil or petroleum jelly on a cotton ball. Gently rub the tape until it is removed.  Follow-up care  Follow up with your healthcare provider to ask how long sutures or staples should be left in place. Be sure to return for suture or staple removal as directed. If dissolving stitches were used in your mouth, these will not need to be removed. They should fall out or dissolve on their own.  If tape closures were used, remove them yourself when your provider recommends if they have not fallen off on their own. If skin glue was used, the glue will wear off by itself.      When to seek medical care  Call your healthcare provider right away if you have any of the following:  · More pain, redness, swelling, bleeding, or foul-smelling discharge around the incision area  · Fever of 100.4°F (38°C) or higher, or as directed by your healthcare provider  · Shaking chills  · Vomiting or nausea that doesnt go away  · Numbness, coldness, or tingling around the incision area, or changes in skin color  · Opening of the sutures or wound  · Stitches or staples come apart or fall out or surgical tape falls off before 7 days, or as directed by your provider   Date Last Reviewed: 10/22/2014  © 6987-4538 [employer]. [address] [place] [address]. All rights reserved. This information is not intended as a substitute for professional medical care. Always follow your healthcare professional's instructions.        PATIENT INSTRUCTIONS  POST-ANESTHESIA    IMMEDIATELY FOLLOWING SURGERY:  Do not drive or operate machinery for the first twenty four hours  after surgery.  Do not make any important decisions for twenty four hours after surgery or while taking narcotic pain medications or sedatives.  If you develop intractable nausea and vomiting or a severe headache please notify your doctor immediately.    FOLLOW-UP:  Please make an appointment with your surgeon as instructed. You do not need to follow up with anesthesia unless specifically instructed to do so.    WOUND CARE INSTRUCTIONS (if applicable):  Keep a dry clean dressing on the anesthesia/puncture wound site if there is drainage.  Once the wound has quit draining you may leave it open to air.  Generally you should leave the bandage intact for twenty four hours unless there is drainage.  If the epidural site drains for more than 36-48 hours please call the anesthesia department.    QUESTIONS?:  Please feel free to call your physician or the hospital  if you have any questions, and they will be happy to assist you.                  sweats, swollen glands  No personal h/o gout   No family or personal history of psoriasis, ulcerative colitis or chron's disease. No high risk sexual history. No low back pain or buttock pain. No h/o sausage digits, iritis. No enthesitis, plantar fascitis or heel pain.   Patient denies any raynauds, malar rash, recurrent mouth/genital ulcers, sicca symptoms, recurrent sinusitis/rhinitis,swallowing difficulty, hearing or visual changes recently.   No h/o miscarriages or arterial/venous thrombosis in the past  No h/o persistent shortness of breath, cough, chest pain  No h/o persistent nausea, vomiting, diarrhea, abdominal pain  No h/o hematochezia, hematuria, hemoptysis, hematemesis  No persistent headache, tingling, numbness, weakness. No h/o seizures or strokes  No temporal headache, no jaw claudication, no scalp tenderness, vision changes, carotidynia, cough    Orders Placed during initial Encounter 2014  XR Hand Bilateral G/E 3 Views  XR Foot Bilateral G/E 3 Views  M Tuberculosis by Quantiferon  CRP inflammation  Erythrocyte sedimentation rate auto  Cyclic Citrullinated Peptide IgG IgA  Hepatitis B surface antigen  Hepatitis C antibody  Antinuclear antibody screen by EIA  Rheumatoid factor    ACPA >250; RF > 600 consistent with the diagnosis of rheumatoid arthritis  Inflammatory markers negative.   Rest of the above labs are within normal limits. No erosions noted in xrays.     RIGOBERTO neg    Interim history:    Interim history:  Since last visit:  1. Infections - No  2. New symptoms/medical problem - No  3. Any side effects from Rheum medications - No  3. ER visits/Hospitalizations/surgeries - No    No morning stiffness X 5 to 10 mins  Denies any daily joint pains    February 23, 2017   Joint pain history:  Involved joints: feet   Pain scale: 3-4/10     Wakes the patient from sleep : no  Morning stiffness :  10 mins  Meds used: Enbrel weekly    Patient can noticed increased achiness on the sixth day of the enbrel  cycle  Lost 20 pounds with exercise.     February 26, 2018  Have you ever seen a rheumatologist Yes Who You When 2/23/17  Joint pain history  Onset: pt states that she is doing well. Has not had any flares  Involved joints: see above   Pain scale:  0.5/10     Wakes the patient from sleep : No  Morning stiffness:Yes for 5 minutes  Meds used:enbrel monotherapy     Interim history  Since last visit:  1. Infections - No  2. New symptoms/medical problem - No  3. Any side effects from Rheum medications -none  3. ER visits/Hospitalizations/surgeries - No  4. Last PCP visit: 3 days ago     Wt Readings from Last 4 Encounters:   03/04/19 62.1 kg (137 lb)   02/26/18 63 kg (138 lb 12.8 oz)   02/23/17 60.8 kg (134 lb)   02/04/16 71 kg (156 lb 9.6 oz)          Review of Systems:   Complete ROS negative except for symptoms mentioned in the HPI          Past Medical History:     Past Medical History:   Diagnosis Date     Anxiety     On sertraline     Hypovitaminosis D      Nephrolithiasis      Osteoporosis      Rheumatoid arthritis(714.0)     Dx'd 12 yrs ago     Past Surgical History:   Procedure Laterality Date     HYSTERECTOMY      For precancerous cervical lesions            Social History:     Social History     Occupational History     Occupation: [employer]   Tobacco Use     Smoking status: Former Smoker     Packs/day: 1.00     Years: 25.00     Pack years: 25.00     Types: Cigarettes     Smokeless tobacco: Former User     Quit date: 1/10/2015   Substance and Sexual Activity     Alcohol use: Yes     Alcohol/week: 0.0 oz     Comment: socially     Drug use: No     Sexual activity: Yes     Works in [employer]  Smokes 1ppd x 25 years  - quit 2015  EtOH - <1 drink/week  Denies illegal drug use       Family History:     Family History   Problem Relation Age of Onset     Diabetes Father      Hypertension Father      Diabetes Other         maternal grandparent     Hypertension Mother      Rheumatoid Arthritis  "Paternal Grandmother        Father - living - DM 2, HTN, thyroid disorder (pt unable to specify)  Mother - living - HTN         Allergies:   No Known Allergies         Medications:     Current Outpatient Medications   Medication Sig Dispense Refill     Alendronate Sodium (FOSAMAX PO) Take 70 mg by mouth once a week       calcium-vitamin D (CALTRATE) 600-400 MG-UNIT per tablet Take 1 tablet by mouth 2 times daily 120 tablet 3     etanercept (ENBREL SURECLICK) 50 MG/ML autoinjector Inject 50 mg Subcutaneous See Admin Instructions Every 10 days. Hold for signs of infection, and seek medical attention. 1 kit 6     FIBER PO        sertraline (ZOLOFT) 50 MG tablet Take 50 mg by mouth daily              Physical Exam:   Blood pressure 108/70, pulse 76, temperature 98.1  F (36.7  C), temperature source Oral, height 1.702 m (5' 7\"), weight 62.1 kg (137 lb), SpO2 95 %.  Wt Readings from Last 4 Encounters:   03/04/19 62.1 kg (137 lb)   02/26/18 63 kg (138 lb 12.8 oz)   02/23/17 60.8 kg (134 lb)   02/04/16 71 kg (156 lb 9.6 oz)       Constitutional: well-developed, appearing stated age; cooperative  Eyes: nl conjunctiva, sclera  ENT: nl external ears, nose, hearing, lips, teeth, gums, throat  No mucous membrane lesions, normal saliva pool  Neck: no mass or thyroid enlargement  Resp: lungs clear to auscultation,   CV: RRR, no murmurs, rubs or gallops, no edema  GI: no ABD mass or tenderness, no HSM  : not tested  Lymph: no cervical, supraclavicular or epitrochlear nodes  MS: All  shoulder, elbow, wrist, MCP/PIP/DIP, spine, hip, knee, ankle, and foot  MTP/IP joints were examined. Minimal synovial thickening of MCP/PIPs b/l- has resolved. Rheumatoid nodule on palmar aspect of R 4th finger still there. Right elbow 0.3cm rheumatoid nodule - smaller than last year  2 big subcutaneous nodules in the great toes bilaterally. Full ROM.  No dactylitis,  tenosynovitis, enthesopathy.  Skin: no nail pitting, alopecia, rash, nodules or " lesions  Psych: nl judgement, orientation, memory, affect.         Data:     Results for orders placed or performed in visit on 18   CBC with platelets differential   Result Value Ref Range    WBC 6.4 4.0 - 11.0 10e9/L    RBC Count 4.24 3.8 - 5.2 10e12/L    Hemoglobin 13.1 11.7 - 15.7 g/dL    Hematocrit 40.6 35.0 - 47.0 %    MCV 96 78 - 100 fl    MCH 30.9 26.5 - 33.0 pg    MCHC 32.3 31.5 - 36.5 g/dL    RDW 12.6 10.0 - 15.0 %    Platelet Count 218 150 - 450 10e9/L    Diff Method Automated Method     % Neutrophils 48.5 %    % Lymphocytes 38.5 %    % Monocytes 6.6 %    % Eosinophils 5.8 %    % Basophils 0.6 %    Absolute Neutrophil 3.1 1.6 - 8.3 10e9/L    Absolute Lymphocytes 2.5 0.8 - 5.3 10e9/L    Absolute Monocytes 0.4 0.0 - 1.3 10e9/L    Absolute Eosinophils 0.4 0.0 - 0.7 10e9/L    Absolute Basophils 0.0 0.0 - 0.2 10e9/L   AST   Result Value Ref Range    AST 30 0 - 45 U/L   ALT   Result Value Ref Range    ALT 32 0 - 50 U/L   Creatinine   Result Value Ref Range    Creatinine 0.74 0.52 - 1.04 mg/dL    GFR Estimate 82 >60 mL/min/1.7m2    GFR Estimate If Black >90 >60 mL/min/1.7m2   Erythrocyte sedimentation rate auto   Result Value Ref Range    Sed Rate 16 0 - 30 mm/h   Hepatitis B surface antigen   Result Value Ref Range    Hep B Surface Agn Nonreactive NR^Nonreactive   Hepatitis C antibody   Result Value Ref Range    Hepatitis C Antibody Nonreactive NR^Nonreactive   M Tuberculosis by Quantiferon   Result Value Ref Range    M Tuberculosis Result Negative NEG^Negative    M Tuberculosis Antigen Value 0.02 IU/mL       No results found for this basename: WBC:3,RBC:3,HGB:3,HCT:3,MCT:3,MCV:3,RDW:3,PLT:3,CCPAB,ALBUMIN:3, AST:3, ALT:3,CRP:3, ESR:3,BUN:3,CREAT:3 in the last 31252 hours   No results found for this basename: TSH:3,T4:3 in the last 33362 hours  Greg Merida MD    Division of Rheumatology  [facility]  Phone (Patients line): [phone]  Pager (healthcare  professionals only): [phone]  Phone (healthcare professionals only line): [phone]  HPI      ROS      Physical Exam

## 2021-10-01 ENCOUNTER — TELEPHONE (OUTPATIENT)
Dept: BARIATRICS | Facility: CLINIC | Age: 36
End: 2021-10-01

## 2021-10-01 ENCOUNTER — PATIENT MESSAGE (OUTPATIENT)
Dept: BARIATRICS | Facility: CLINIC | Age: 36
End: 2021-10-01

## 2021-10-08 ENCOUNTER — OFFICE VISIT (OUTPATIENT)
Dept: CARDIOLOGY | Facility: CLINIC | Age: 36
End: 2021-10-08
Payer: COMMERCIAL

## 2021-10-08 ENCOUNTER — TELEPHONE (OUTPATIENT)
Dept: SURGERY | Facility: CLINIC | Age: 36
End: 2021-10-08

## 2021-10-08 VITALS
HEIGHT: 66 IN | OXYGEN SATURATION: 100 % | HEART RATE: 84 BPM | DIASTOLIC BLOOD PRESSURE: 80 MMHG | BODY MASS INDEX: 36.03 KG/M2 | SYSTOLIC BLOOD PRESSURE: 120 MMHG | WEIGHT: 224.19 LBS

## 2021-10-08 DIAGNOSIS — R94.31 ABNORMAL EKG: ICD-10-CM

## 2021-10-08 LAB
FINAL PATHOLOGIC DIAGNOSIS: NORMAL
GROSS: NORMAL
Lab: NORMAL

## 2021-10-08 PROCEDURE — 99999 PR PBB SHADOW E&M-EST. PATIENT-LVL IV: ICD-10-PCS | Mod: PBBFAC,,, | Performed by: STUDENT IN AN ORGANIZED HEALTH CARE EDUCATION/TRAINING PROGRAM

## 2021-10-08 PROCEDURE — 99999 PR PBB SHADOW E&M-EST. PATIENT-LVL IV: CPT | Mod: PBBFAC,,, | Performed by: STUDENT IN AN ORGANIZED HEALTH CARE EDUCATION/TRAINING PROGRAM

## 2021-10-08 PROCEDURE — 1111F DSCHRG MED/CURRENT MED MERGE: CPT | Mod: CPTII,S$GLB,, | Performed by: STUDENT IN AN ORGANIZED HEALTH CARE EDUCATION/TRAINING PROGRAM

## 2021-10-08 PROCEDURE — 1111F PR DISCHARGE MEDS RECONCILED W/ CURRENT OUTPATIENT MED LIST: ICD-10-PCS | Mod: CPTII,S$GLB,, | Performed by: STUDENT IN AN ORGANIZED HEALTH CARE EDUCATION/TRAINING PROGRAM

## 2021-10-08 PROCEDURE — 99203 OFFICE O/P NEW LOW 30 MIN: CPT | Mod: S$GLB,,, | Performed by: STUDENT IN AN ORGANIZED HEALTH CARE EDUCATION/TRAINING PROGRAM

## 2021-10-08 PROCEDURE — 99203 PR OFFICE/OUTPT VISIT, NEW, LEVL III, 30-44 MIN: ICD-10-PCS | Mod: S$GLB,,, | Performed by: STUDENT IN AN ORGANIZED HEALTH CARE EDUCATION/TRAINING PROGRAM

## 2021-10-13 ENCOUNTER — OFFICE VISIT (OUTPATIENT)
Dept: BARIATRICS | Facility: CLINIC | Age: 36
End: 2021-10-13
Payer: COMMERCIAL

## 2021-10-13 ENCOUNTER — LAB VISIT (OUTPATIENT)
Dept: LAB | Facility: HOSPITAL | Age: 36
End: 2021-10-13
Payer: COMMERCIAL

## 2021-10-13 ENCOUNTER — CLINICAL SUPPORT (OUTPATIENT)
Dept: BARIATRICS | Facility: CLINIC | Age: 36
End: 2021-10-13
Payer: COMMERCIAL

## 2021-10-13 VITALS
BODY MASS INDEX: 35.79 KG/M2 | HEART RATE: 85 BPM | BODY MASS INDEX: 35.97 KG/M2 | OXYGEN SATURATION: 100 % | SYSTOLIC BLOOD PRESSURE: 116 MMHG | WEIGHT: 222.69 LBS | DIASTOLIC BLOOD PRESSURE: 74 MMHG | HEIGHT: 66 IN | WEIGHT: 222.88 LBS

## 2021-10-13 VITALS
HEART RATE: 85 BPM | SYSTOLIC BLOOD PRESSURE: 116 MMHG | OXYGEN SATURATION: 100 % | HEIGHT: 66 IN | DIASTOLIC BLOOD PRESSURE: 74 MMHG | WEIGHT: 222.69 LBS | BODY MASS INDEX: 35.79 KG/M2

## 2021-10-13 DIAGNOSIS — Z98.84 H/O LAPAROSCOPIC ADJUSTABLE GASTRIC BANDING: Primary | ICD-10-CM

## 2021-10-13 DIAGNOSIS — Z71.3 DIETARY COUNSELING AND SURVEILLANCE: ICD-10-CM

## 2021-10-13 DIAGNOSIS — Z98.84 BARIATRIC SURGERY STATUS: ICD-10-CM

## 2021-10-13 DIAGNOSIS — E66.01 CLASS 2 SEVERE OBESITY DUE TO EXCESS CALORIES WITH SERIOUS COMORBIDITY AND BODY MASS INDEX (BMI) OF 35.0 TO 35.9 IN ADULT: Primary | ICD-10-CM

## 2021-10-13 DIAGNOSIS — R13.19 OTHER DYSPHAGIA: ICD-10-CM

## 2021-10-13 DIAGNOSIS — Z98.890 POST-OPERATIVE STATE: Primary | ICD-10-CM

## 2021-10-13 DIAGNOSIS — E66.09 CLASS 2 OBESITY DUE TO EXCESS CALORIES WITH BODY MASS INDEX (BMI) OF 35.0 TO 35.9 IN ADULT, UNSPECIFIED WHETHER SERIOUS COMORBIDITY PRESENT: ICD-10-CM

## 2021-10-13 DIAGNOSIS — E66.01 CLASS 2 SEVERE OBESITY DUE TO EXCESS CALORIES WITH SERIOUS COMORBIDITY AND BODY MASS INDEX (BMI) OF 35.0 TO 35.9 IN ADULT: ICD-10-CM

## 2021-10-13 LAB
ALBUMIN SERPL BCP-MCNC: 3.5 G/DL (ref 3.5–5.2)
ALP SERPL-CCNC: 48 U/L (ref 55–135)
ALT SERPL W/O P-5'-P-CCNC: 28 U/L (ref 10–44)
ANION GAP SERPL CALC-SCNC: 12 MMOL/L (ref 8–16)
AST SERPL-CCNC: 14 U/L (ref 10–40)
BILIRUB SERPL-MCNC: 0.3 MG/DL (ref 0.1–1)
BUN SERPL-MCNC: 22 MG/DL (ref 6–20)
CALCIUM SERPL-MCNC: 9.5 MG/DL (ref 8.7–10.5)
CHLORIDE SERPL-SCNC: 104 MMOL/L (ref 95–110)
CHOLEST SERPL-MCNC: 188 MG/DL (ref 120–199)
CHOLEST/HDLC SERPL: 2.9 {RATIO} (ref 2–5)
CO2 SERPL-SCNC: 21 MMOL/L (ref 23–29)
CREAT SERPL-MCNC: 0.9 MG/DL (ref 0.5–1.4)
EST. GFR  (AFRICAN AMERICAN): >60 ML/MIN/1.73 M^2
EST. GFR  (NON AFRICAN AMERICAN): >60 ML/MIN/1.73 M^2
GLUCOSE SERPL-MCNC: 113 MG/DL (ref 70–110)
HDLC SERPL-MCNC: 64 MG/DL (ref 40–75)
HDLC SERPL: 34 % (ref 20–50)
LDLC SERPL CALC-MCNC: 114.2 MG/DL (ref 63–159)
NONHDLC SERPL-MCNC: 124 MG/DL
POTASSIUM SERPL-SCNC: 4.5 MMOL/L (ref 3.5–5.1)
PROT SERPL-MCNC: 6.9 G/DL (ref 6–8.4)
SODIUM SERPL-SCNC: 137 MMOL/L (ref 136–145)
TRIGL SERPL-MCNC: 49 MG/DL (ref 30–150)
VIT B12 SERPL-MCNC: 342 PG/ML (ref 210–950)

## 2021-10-13 PROCEDURE — 3078F DIAST BP <80 MM HG: CPT | Mod: CPTII,S$GLB,, | Performed by: PHYSICIAN ASSISTANT

## 2021-10-13 PROCEDURE — 3008F PR BODY MASS INDEX (BMI) DOCUMENTED: ICD-10-PCS | Mod: CPTII,S$GLB,, | Performed by: INTERNAL MEDICINE

## 2021-10-13 PROCEDURE — 99499 UNLISTED E&M SERVICE: CPT | Mod: S$GLB,,, | Performed by: DIETITIAN, REGISTERED

## 2021-10-13 PROCEDURE — 99024 PR POST-OP FOLLOW-UP VISIT: ICD-10-PCS | Mod: S$GLB,,, | Performed by: PHYSICIAN ASSISTANT

## 2021-10-13 PROCEDURE — 3074F SYST BP LT 130 MM HG: CPT | Mod: CPTII,S$GLB,, | Performed by: PHYSICIAN ASSISTANT

## 2021-10-13 PROCEDURE — 3044F HG A1C LEVEL LT 7.0%: CPT | Mod: CPTII,S$GLB,, | Performed by: PHYSICIAN ASSISTANT

## 2021-10-13 PROCEDURE — 99999 PR PBB SHADOW E&M-EST. PATIENT-LVL III: CPT | Mod: PBBFAC,,, | Performed by: PHYSICIAN ASSISTANT

## 2021-10-13 PROCEDURE — 3074F PR MOST RECENT SYSTOLIC BLOOD PRESSURE < 130 MM HG: ICD-10-PCS | Mod: CPTII,S$GLB,, | Performed by: PHYSICIAN ASSISTANT

## 2021-10-13 PROCEDURE — 99213 OFFICE O/P EST LOW 20 MIN: CPT | Mod: S$GLB,,, | Performed by: INTERNAL MEDICINE

## 2021-10-13 PROCEDURE — 1159F MED LIST DOCD IN RCRD: CPT | Mod: CPTII,S$GLB,, | Performed by: PHYSICIAN ASSISTANT

## 2021-10-13 PROCEDURE — 1160F PR REVIEW ALL MEDS BY PRESCRIBER/CLIN PHARMACIST DOCUMENTED: ICD-10-PCS | Mod: CPTII,S$GLB,, | Performed by: PHYSICIAN ASSISTANT

## 2021-10-13 PROCEDURE — 99999 PR PBB SHADOW E&M-EST. PATIENT-LVL II: CPT | Mod: PBBFAC,,, | Performed by: DIETITIAN, REGISTERED

## 2021-10-13 PROCEDURE — 99024 POSTOP FOLLOW-UP VISIT: CPT | Mod: S$GLB,,, | Performed by: PHYSICIAN ASSISTANT

## 2021-10-13 PROCEDURE — 80061 LIPID PANEL: CPT | Performed by: SURGERY

## 2021-10-13 PROCEDURE — 1159F PR MEDICATION LIST DOCUMENTED IN MEDICAL RECORD: ICD-10-PCS | Mod: CPTII,S$GLB,, | Performed by: PHYSICIAN ASSISTANT

## 2021-10-13 PROCEDURE — 3008F BODY MASS INDEX DOCD: CPT | Mod: CPTII,S$GLB,, | Performed by: PHYSICIAN ASSISTANT

## 2021-10-13 PROCEDURE — 3074F PR MOST RECENT SYSTOLIC BLOOD PRESSURE < 130 MM HG: ICD-10-PCS | Mod: CPTII,S$GLB,, | Performed by: INTERNAL MEDICINE

## 2021-10-13 PROCEDURE — 99499 NO LOS: ICD-10-PCS | Mod: S$GLB,,, | Performed by: DIETITIAN, REGISTERED

## 2021-10-13 PROCEDURE — 1159F PR MEDICATION LIST DOCUMENTED IN MEDICAL RECORD: ICD-10-PCS | Mod: CPTII,S$GLB,, | Performed by: INTERNAL MEDICINE

## 2021-10-13 PROCEDURE — 3074F SYST BP LT 130 MM HG: CPT | Mod: CPTII,S$GLB,, | Performed by: INTERNAL MEDICINE

## 2021-10-13 PROCEDURE — 3044F HG A1C LEVEL LT 7.0%: CPT | Mod: CPTII,S$GLB,, | Performed by: INTERNAL MEDICINE

## 2021-10-13 PROCEDURE — 3044F PR MOST RECENT HEMOGLOBIN A1C LEVEL <7.0%: ICD-10-PCS | Mod: CPTII,S$GLB,, | Performed by: INTERNAL MEDICINE

## 2021-10-13 PROCEDURE — 99999 PR PBB SHADOW E&M-EST. PATIENT-LVL IV: CPT | Mod: PBBFAC,,, | Performed by: INTERNAL MEDICINE

## 2021-10-13 PROCEDURE — 99213 PR OFFICE/OUTPT VISIT, EST, LEVL III, 20-29 MIN: ICD-10-PCS | Mod: S$GLB,,, | Performed by: INTERNAL MEDICINE

## 2021-10-13 PROCEDURE — 3078F PR MOST RECENT DIASTOLIC BLOOD PRESSURE < 80 MM HG: ICD-10-PCS | Mod: CPTII,S$GLB,, | Performed by: PHYSICIAN ASSISTANT

## 2021-10-13 PROCEDURE — 99999 PR PBB SHADOW E&M-EST. PATIENT-LVL III: ICD-10-PCS | Mod: PBBFAC,,, | Performed by: PHYSICIAN ASSISTANT

## 2021-10-13 PROCEDURE — 82607 VITAMIN B-12: CPT | Performed by: SURGERY

## 2021-10-13 PROCEDURE — 1160F RVW MEDS BY RX/DR IN RCRD: CPT | Mod: CPTII,S$GLB,, | Performed by: PHYSICIAN ASSISTANT

## 2021-10-13 PROCEDURE — 1159F MED LIST DOCD IN RCRD: CPT | Mod: CPTII,S$GLB,, | Performed by: INTERNAL MEDICINE

## 2021-10-13 PROCEDURE — 99999 PR PBB SHADOW E&M-EST. PATIENT-LVL II: ICD-10-PCS | Mod: PBBFAC,,, | Performed by: DIETITIAN, REGISTERED

## 2021-10-13 PROCEDURE — 1111F PR DISCHARGE MEDS RECONCILED W/ CURRENT OUTPATIENT MED LIST: ICD-10-PCS | Mod: CPTII,S$GLB,, | Performed by: INTERNAL MEDICINE

## 2021-10-13 PROCEDURE — 84425 ASSAY OF VITAMIN B-1: CPT | Performed by: SURGERY

## 2021-10-13 PROCEDURE — 3078F DIAST BP <80 MM HG: CPT | Mod: CPTII,S$GLB,, | Performed by: INTERNAL MEDICINE

## 2021-10-13 PROCEDURE — 80053 COMPREHEN METABOLIC PANEL: CPT | Performed by: SURGERY

## 2021-10-13 PROCEDURE — 3008F PR BODY MASS INDEX (BMI) DOCUMENTED: ICD-10-PCS | Mod: CPTII,S$GLB,, | Performed by: PHYSICIAN ASSISTANT

## 2021-10-13 PROCEDURE — 99999 PR PBB SHADOW E&M-EST. PATIENT-LVL IV: ICD-10-PCS | Mod: PBBFAC,,, | Performed by: INTERNAL MEDICINE

## 2021-10-13 PROCEDURE — 3044F PR MOST RECENT HEMOGLOBIN A1C LEVEL <7.0%: ICD-10-PCS | Mod: CPTII,S$GLB,, | Performed by: PHYSICIAN ASSISTANT

## 2021-10-13 PROCEDURE — 3008F BODY MASS INDEX DOCD: CPT | Mod: CPTII,S$GLB,, | Performed by: INTERNAL MEDICINE

## 2021-10-13 PROCEDURE — 3078F PR MOST RECENT DIASTOLIC BLOOD PRESSURE < 80 MM HG: ICD-10-PCS | Mod: CPTII,S$GLB,, | Performed by: INTERNAL MEDICINE

## 2021-10-13 PROCEDURE — 1160F PR REVIEW ALL MEDS BY PRESCRIBER/CLIN PHARMACIST DOCUMENTED: ICD-10-PCS | Mod: CPTII,S$GLB,, | Performed by: INTERNAL MEDICINE

## 2021-10-13 PROCEDURE — 36415 COLL VENOUS BLD VENIPUNCTURE: CPT | Performed by: SURGERY

## 2021-10-13 PROCEDURE — 1111F DSCHRG MED/CURRENT MED MERGE: CPT | Mod: CPTII,S$GLB,, | Performed by: INTERNAL MEDICINE

## 2021-10-13 PROCEDURE — 1160F RVW MEDS BY RX/DR IN RCRD: CPT | Mod: CPTII,S$GLB,, | Performed by: INTERNAL MEDICINE

## 2021-10-13 RX ORDER — SEMAGLUTIDE 2.4 MG/.75ML
2.4 INJECTION, SOLUTION SUBCUTANEOUS
Qty: 3 ML | Refills: 0 | Status: SHIPPED | OUTPATIENT
Start: 2022-02-13 | End: 2022-02-21

## 2021-10-13 RX ORDER — SEMAGLUTIDE 1 MG/.5ML
1 INJECTION, SOLUTION SUBCUTANEOUS
Qty: 2 ML | Refills: 0 | Status: SHIPPED | OUTPATIENT
Start: 2021-11-13 | End: 2022-02-21

## 2021-10-13 RX ORDER — SEMAGLUTIDE 1.7 MG/.75ML
1.7 INJECTION, SOLUTION SUBCUTANEOUS
Qty: 3 ML | Refills: 0 | Status: SHIPPED | OUTPATIENT
Start: 2022-01-13 | End: 2022-02-21

## 2021-10-13 RX ORDER — SEMAGLUTIDE 0.5 MG/.5ML
0.5 INJECTION, SOLUTION SUBCUTANEOUS
Qty: 2 ML | Refills: 0 | Status: SHIPPED | OUTPATIENT
Start: 2021-11-13 | End: 2022-02-21

## 2021-10-13 RX ORDER — SEMAGLUTIDE 0.25 MG/.5ML
0.25 INJECTION, SOLUTION SUBCUTANEOUS
Qty: 2 ML | Refills: 0 | Status: SHIPPED | OUTPATIENT
Start: 2021-10-13 | End: 2022-02-21

## 2021-10-18 LAB — VIT B1 BLD-MCNC: 52 UG/L (ref 38–122)

## 2021-10-19 NOTE — H&P (VIEW-ONLY)
HISTORY OF PRESENT ILLNESS:  Ms. Blanco returns today due to increasing   numbness and tingling within the left hand.  The patient last received her   injection to the left carpal tunnel in August of this year.  She states that she   is also experiencing pain that radiates up into the forearm and arm.  She   denies any other significant complaints and has no other complaints today.  She   would like to pursue surgery in early January if possible and she would also   like to pursue bilateral carpal tunnel release at the same time due to her   limited availability of time off work.    Past Medical History:   Diagnosis Date    Carpal tunnel syndrome     Miscarriage        Past Surgical History:   Procedure Laterality Date    breast reduction      hematoma surgery      LAPAROSCOPIC GASTRIC BANDING         Social History     Social History    Marital status: Single     Spouse name: N/A    Number of children: 1    Years of education: N/A     Occupational History    Not on file.     Social History Main Topics    Smoking status: Never Smoker    Smokeless tobacco: Never Used    Alcohol use Yes      Comment: occasional.    Drug use: No    Sexual activity: Not on file     Other Topics Concern    Not on file     Social History Narrative    No narrative on file       Current Outpatient Prescriptions on File Prior to Visit   Medication Sig Dispense Refill    cetirizine (ZYRTEC) 10 MG tablet Take 1 tablet (10 mg total) by mouth once daily. For allergies. 30 tablet 11    ibuprofen (ADVIL,MOTRIN) 600 MG tablet Take 1 tablet (600 mg total) by mouth every 8 (eight) hours as needed for Pain. 40 tablet 5    meloxicam (MOBIC) 15 MG tablet Take 1 tablet (15 mg total) by mouth once daily. 30 tablet 2    ranitidine (ZANTAC) 150 MG capsule Take 1 capsule (150 mg total) by mouth 2 (two) times daily as needed for Heartburn. 60 capsule 11     No current facility-administered medications on file prior to visit.   "      Review of patient's allergies indicates:  No Known Allergies    Review of Systems:  Constitutional: no fever or chills  ENT: no nasal congestion or sore throat  Respiratory: no cough or shortness of breath  Cardiovascular: no chest pain or palpitations  Gastrointestinal: no nausea or vomiting  Genitourinary: no hematuria or dysuria  Integument/Breast: no rash or pruritis  Hematologic/Lymphatic: no easy bruising or lymphadenopathy  Musculoskeletal: see HPI  Neurological: no seizures or tremors  Behavioral/Psych: no auditory or visual hallucinations      PHYSICAL EXAM    Vitals:    12/15/17 1515   BP: 115/80   Pulse: 99   Weight: 88.9 kg (196 lb)   Height: 5' 3" (1.6 m)   PainSc:   2   PainLoc: Wrist       PHYSICAL EXAMINATION:  GENERAL:  In no acute distress, alert and oriented x3, cooperative, well   nourished.  LUNGS:  Nonlabored, on room air.  CARDIOVASCULAR:  Distal pulses are intact.  Good capillary refill.  No clubbing,   cyanosis or edema.  EXTREMITIES:  Left upper extremity, positive Tinel's and Durkan sign over the   carpal tunnel.  She has tenderness to the lateral epicondyle to deep palpation.    She has full active range of motion of all digits in all joints.  She is   neurovascularly intact in the median, radial and ulnar distributions.    I have explained the risks, benefits, and alternatives of the procedure in detail.  The patient voices understanding and all questions have been answered.  The patient agrees to proceed as planned. After a sterile prep of the skin the left carpal tunnel is injected from the volar approach using a 25 gauge needle with a combination of 1cc 1% plain xylocaine and 40 mg of Kenalog.  The patient is cautioned and immediate relief of pain is secondary to the local anesthetic and will be temporary.  After the anesthetic wears off there may be a increase in pain that may last for a few hours or a few days and they should use ice to help alleviate this flair up of pain. "       ASSESSMENT:  1.  Lateral epicondylitis of the left elbow.  2.  Bilateral carpal tunnel syndrome.    PLAN:  The patient was offered a repeat injection to the left carpal tunnel.  We   discussed performing a bilateral carpal tunnel release on 01/12/2018.  I   discussed risks, benefits and alternatives with the patient in detail.  I   further discussed that it would be difficult for her postoperatively due to   incapacitation of both hands.  The patient understood this.  She states that she   will have help at home and would like to pursue this due to her limited time   off work; therefore, she is scheduled for bilateral carpal tunnel syndrome.  I   started midlateral carpal tunnel release on 01/12/2017.      DELANEY/SOLOMON  dd: 12/15/2017 16:27:12 (CST)  td: 12/16/2017 15:09:14 (CST)  Doc ID   #[id number]  Job ID #[id number]    CC:       Dictation Confirmation Code: [id number]  Michael Agrawal Jr. MD  12/15/2017  4:27 PM         - chronic  - tele  - EKG: HR: 74, Atrial fibrillation, Incomplete right bundle branch block, Nonspecific T wave abnormality, Prolonged QT(461)  - off digoxin and labetalol since last admission 2/2 Bradycardia & Pauses -- remains rate controlled  - AC with Eliquis 5mg BID   - Cardiology, Dr. Bliss's, group following

## 2021-10-25 ENCOUNTER — IMMUNIZATION (OUTPATIENT)
Dept: INTERNAL MEDICINE | Facility: CLINIC | Age: 36
End: 2021-10-25
Payer: COMMERCIAL

## 2021-10-25 DIAGNOSIS — Z23 NEED FOR VACCINATION: Primary | ICD-10-CM

## 2021-10-25 PROCEDURE — 0003A COVID-19, MRNA, LNP-S, PF, 30 MCG/0.3 ML DOSE VACCINE: CPT | Mod: CV19,PBBFAC | Performed by: INTERNAL MEDICINE

## 2021-10-25 PROCEDURE — 91300 COVID-19, MRNA, LNP-S, PF, 30 MCG/0.3 ML DOSE VACCINE: CPT | Mod: PBBFAC | Performed by: INTERNAL MEDICINE

## 2021-11-12 ENCOUNTER — PATIENT MESSAGE (OUTPATIENT)
Dept: BARIATRICS | Facility: CLINIC | Age: 36
End: 2021-11-12
Payer: COMMERCIAL

## 2021-11-15 ENCOUNTER — PATIENT MESSAGE (OUTPATIENT)
Dept: BARIATRICS | Facility: CLINIC | Age: 36
End: 2021-11-15
Payer: COMMERCIAL

## 2021-12-02 ENCOUNTER — TELEPHONE (OUTPATIENT)
Dept: BARIATRICS | Facility: CLINIC | Age: 36
End: 2021-12-02
Payer: COMMERCIAL

## 2021-12-15 ENCOUNTER — OFFICE VISIT (OUTPATIENT)
Dept: BARIATRICS | Facility: CLINIC | Age: 36
End: 2021-12-15
Payer: COMMERCIAL

## 2021-12-15 ENCOUNTER — CLINICAL SUPPORT (OUTPATIENT)
Dept: BARIATRICS | Facility: CLINIC | Age: 36
End: 2021-12-15
Payer: COMMERCIAL

## 2021-12-15 ENCOUNTER — LAB VISIT (OUTPATIENT)
Dept: LAB | Facility: HOSPITAL | Age: 36
End: 2021-12-15
Attending: SURGERY
Payer: COMMERCIAL

## 2021-12-15 VITALS
DIASTOLIC BLOOD PRESSURE: 75 MMHG | HEART RATE: 81 BPM | OXYGEN SATURATION: 100 % | SYSTOLIC BLOOD PRESSURE: 123 MMHG | BODY MASS INDEX: 35.97 KG/M2 | WEIGHT: 222.88 LBS

## 2021-12-15 DIAGNOSIS — R13.19 OTHER DYSPHAGIA: ICD-10-CM

## 2021-12-15 DIAGNOSIS — Z98.84 BARIATRIC SURGERY STATUS: ICD-10-CM

## 2021-12-15 DIAGNOSIS — R63.4 WEIGHT LOSS: Primary | ICD-10-CM

## 2021-12-15 DIAGNOSIS — Z71.3 DIETARY COUNSELING AND SURVEILLANCE: ICD-10-CM

## 2021-12-15 DIAGNOSIS — E66.09 CLASS 2 OBESITY DUE TO EXCESS CALORIES WITH BODY MASS INDEX (BMI) OF 35.0 TO 35.9 IN ADULT, UNSPECIFIED WHETHER SERIOUS COMORBIDITY PRESENT: ICD-10-CM

## 2021-12-15 DIAGNOSIS — Z98.84 S/P BARIATRIC SURGERY: Primary | ICD-10-CM

## 2021-12-15 DIAGNOSIS — Z98.890 POST-OPERATIVE STATE: ICD-10-CM

## 2021-12-15 DIAGNOSIS — E66.9 OBESITY (BMI 30-39.9): ICD-10-CM

## 2021-12-15 LAB
25(OH)D3+25(OH)D2 SERPL-MCNC: 41 NG/ML (ref 30–96)
ALBUMIN SERPL BCP-MCNC: 3.9 G/DL (ref 3.5–5.2)
ALP SERPL-CCNC: 56 U/L (ref 55–135)
ALT SERPL W/O P-5'-P-CCNC: 20 U/L (ref 10–44)
ANION GAP SERPL CALC-SCNC: 9 MMOL/L (ref 8–16)
AST SERPL-CCNC: 15 U/L (ref 10–40)
BASOPHILS # BLD AUTO: 0.02 K/UL (ref 0–0.2)
BASOPHILS NFR BLD: 0.6 % (ref 0–1.9)
BILIRUB SERPL-MCNC: 0.2 MG/DL (ref 0.1–1)
BUN SERPL-MCNC: 14 MG/DL (ref 6–20)
CALCIUM SERPL-MCNC: 9.7 MG/DL (ref 8.7–10.5)
CHLORIDE SERPL-SCNC: 106 MMOL/L (ref 95–110)
CHOLEST SERPL-MCNC: 145 MG/DL (ref 120–199)
CHOLEST/HDLC SERPL: 2.8 {RATIO} (ref 2–5)
CO2 SERPL-SCNC: 21 MMOL/L (ref 23–29)
CREAT SERPL-MCNC: 0.9 MG/DL (ref 0.5–1.4)
DIFFERENTIAL METHOD: ABNORMAL
EOSINOPHIL # BLD AUTO: 0.1 K/UL (ref 0–0.5)
EOSINOPHIL NFR BLD: 3.7 % (ref 0–8)
ERYTHROCYTE [DISTWIDTH] IN BLOOD BY AUTOMATED COUNT: 13 % (ref 11.5–14.5)
EST. GFR  (AFRICAN AMERICAN): >60 ML/MIN/1.73 M^2
EST. GFR  (NON AFRICAN AMERICAN): >60 ML/MIN/1.73 M^2
GLUCOSE SERPL-MCNC: 85 MG/DL (ref 70–110)
HCT VFR BLD AUTO: 42.7 % (ref 37–48.5)
HDLC SERPL-MCNC: 51 MG/DL (ref 40–75)
HDLC SERPL: 35.2 % (ref 20–50)
HGB BLD-MCNC: 13.6 G/DL (ref 12–16)
IMM GRANULOCYTES # BLD AUTO: 0 K/UL (ref 0–0.04)
IMM GRANULOCYTES NFR BLD AUTO: 0 % (ref 0–0.5)
IRON SERPL-MCNC: 36 UG/DL (ref 30–160)
LDLC SERPL CALC-MCNC: 83.8 MG/DL (ref 63–159)
LYMPHOCYTES # BLD AUTO: 1.7 K/UL (ref 1–4.8)
LYMPHOCYTES NFR BLD: 51.5 % (ref 18–48)
MCH RBC QN AUTO: 29.5 PG (ref 27–31)
MCHC RBC AUTO-ENTMCNC: 31.9 G/DL (ref 32–36)
MCV RBC AUTO: 93 FL (ref 82–98)
MONOCYTES # BLD AUTO: 0.4 K/UL (ref 0.3–1)
MONOCYTES NFR BLD: 12.5 % (ref 4–15)
NEUTROPHILS # BLD AUTO: 1 K/UL (ref 1.8–7.7)
NEUTROPHILS NFR BLD: 31.7 % (ref 38–73)
NONHDLC SERPL-MCNC: 94 MG/DL
NRBC BLD-RTO: 0 /100 WBC
PLATELET # BLD AUTO: 381 K/UL (ref 150–450)
PMV BLD AUTO: 9.4 FL (ref 9.2–12.9)
POTASSIUM SERPL-SCNC: 4.7 MMOL/L (ref 3.5–5.1)
PROT SERPL-MCNC: 7.4 G/DL (ref 6–8.4)
RBC # BLD AUTO: 4.61 M/UL (ref 4–5.4)
SATURATED IRON: 9 % (ref 20–50)
SODIUM SERPL-SCNC: 136 MMOL/L (ref 136–145)
TOTAL IRON BINDING CAPACITY: 395 UG/DL (ref 250–450)
TRANSFERRIN SERPL-MCNC: 267 MG/DL (ref 200–375)
TRIGL SERPL-MCNC: 51 MG/DL (ref 30–150)
VIT B12 SERPL-MCNC: 366 PG/ML (ref 210–950)
WBC # BLD AUTO: 3.28 K/UL (ref 3.9–12.7)

## 2021-12-15 PROCEDURE — 99024 POSTOP FOLLOW-UP VISIT: CPT | Mod: S$GLB,,, | Performed by: PHYSICIAN ASSISTANT

## 2021-12-15 PROCEDURE — 99024 PR POST-OP FOLLOW-UP VISIT: ICD-10-PCS | Mod: S$GLB,,, | Performed by: PHYSICIAN ASSISTANT

## 2021-12-15 PROCEDURE — 82306 VITAMIN D 25 HYDROXY: CPT | Performed by: SURGERY

## 2021-12-15 PROCEDURE — 84466 ASSAY OF TRANSFERRIN: CPT | Performed by: SURGERY

## 2021-12-15 PROCEDURE — 80061 LIPID PANEL: CPT | Performed by: SURGERY

## 2021-12-15 PROCEDURE — 99999 PR PBB SHADOW E&M-EST. PATIENT-LVL IV: ICD-10-PCS | Mod: PBBFAC,,, | Performed by: PHYSICIAN ASSISTANT

## 2021-12-15 PROCEDURE — 80053 COMPREHEN METABOLIC PANEL: CPT | Performed by: SURGERY

## 2021-12-15 PROCEDURE — 36415 COLL VENOUS BLD VENIPUNCTURE: CPT | Performed by: SURGERY

## 2021-12-15 PROCEDURE — 85025 COMPLETE CBC W/AUTO DIFF WBC: CPT | Performed by: SURGERY

## 2021-12-15 PROCEDURE — 84425 ASSAY OF VITAMIN B-1: CPT | Performed by: SURGERY

## 2021-12-15 PROCEDURE — 82607 VITAMIN B-12: CPT | Performed by: SURGERY

## 2021-12-15 PROCEDURE — 99499 NO LOS: ICD-10-PCS | Mod: S$GLB,,, | Performed by: DIETITIAN, REGISTERED

## 2021-12-15 PROCEDURE — 99499 UNLISTED E&M SERVICE: CPT | Mod: S$GLB,,, | Performed by: DIETITIAN, REGISTERED

## 2021-12-15 PROCEDURE — 99999 PR PBB SHADOW E&M-EST. PATIENT-LVL IV: CPT | Mod: PBBFAC,,, | Performed by: PHYSICIAN ASSISTANT

## 2021-12-20 ENCOUNTER — TELEPHONE (OUTPATIENT)
Dept: SPORTS MEDICINE | Facility: CLINIC | Age: 36
End: 2021-12-20
Payer: COMMERCIAL

## 2021-12-20 ENCOUNTER — PATIENT MESSAGE (OUTPATIENT)
Dept: SPORTS MEDICINE | Facility: CLINIC | Age: 36
End: 2021-12-20
Payer: COMMERCIAL

## 2021-12-20 LAB — VIT B1 BLD-MCNC: 64 UG/L (ref 38–122)

## 2021-12-23 ENCOUNTER — PATIENT MESSAGE (OUTPATIENT)
Dept: BARIATRICS | Facility: CLINIC | Age: 36
End: 2021-12-23
Payer: COMMERCIAL

## 2021-12-29 ENCOUNTER — TELEPHONE (OUTPATIENT)
Dept: SPORTS MEDICINE | Facility: CLINIC | Age: 36
End: 2021-12-29
Payer: COMMERCIAL

## 2021-12-30 ENCOUNTER — OFFICE VISIT (OUTPATIENT)
Dept: SPORTS MEDICINE | Facility: CLINIC | Age: 36
End: 2021-12-30
Payer: COMMERCIAL

## 2021-12-30 VITALS
HEIGHT: 66 IN | WEIGHT: 222 LBS | SYSTOLIC BLOOD PRESSURE: 114 MMHG | BODY MASS INDEX: 35.68 KG/M2 | HEART RATE: 77 BPM | DIASTOLIC BLOOD PRESSURE: 79 MMHG

## 2021-12-30 DIAGNOSIS — G89.29 CHRONIC PAIN OF BOTH KNEES: ICD-10-CM

## 2021-12-30 DIAGNOSIS — M25.562 CHRONIC PAIN OF BOTH KNEES: ICD-10-CM

## 2021-12-30 DIAGNOSIS — M25.561 CHRONIC PAIN OF BOTH KNEES: ICD-10-CM

## 2021-12-30 DIAGNOSIS — M22.2X1 PATELLOFEMORAL ARTHRALGIA OF BOTH KNEES: Primary | ICD-10-CM

## 2021-12-30 DIAGNOSIS — M22.2X2 PATELLOFEMORAL ARTHRALGIA OF BOTH KNEES: Primary | ICD-10-CM

## 2021-12-30 PROCEDURE — 1159F MED LIST DOCD IN RCRD: CPT | Mod: CPTII,S$GLB,, | Performed by: ORTHOPAEDIC SURGERY

## 2021-12-30 PROCEDURE — 99214 OFFICE O/P EST MOD 30 MIN: CPT | Mod: 25,S$GLB,, | Performed by: ORTHOPAEDIC SURGERY

## 2021-12-30 PROCEDURE — 3078F DIAST BP <80 MM HG: CPT | Mod: CPTII,S$GLB,, | Performed by: ORTHOPAEDIC SURGERY

## 2021-12-30 PROCEDURE — 99999 PR PBB SHADOW E&M-EST. PATIENT-LVL IV: ICD-10-PCS | Mod: PBBFAC,,, | Performed by: ORTHOPAEDIC SURGERY

## 2021-12-30 PROCEDURE — 20610 LARGE JOINT ASPIRATION/INJECTION: BILATERAL KNEE: ICD-10-PCS | Mod: 50,S$GLB,, | Performed by: ORTHOPAEDIC SURGERY

## 2021-12-30 PROCEDURE — 1159F PR MEDICATION LIST DOCUMENTED IN MEDICAL RECORD: ICD-10-PCS | Mod: CPTII,S$GLB,, | Performed by: ORTHOPAEDIC SURGERY

## 2021-12-30 PROCEDURE — 3008F PR BODY MASS INDEX (BMI) DOCUMENTED: ICD-10-PCS | Mod: CPTII,S$GLB,, | Performed by: ORTHOPAEDIC SURGERY

## 2021-12-30 PROCEDURE — 3044F PR MOST RECENT HEMOGLOBIN A1C LEVEL <7.0%: ICD-10-PCS | Mod: CPTII,S$GLB,, | Performed by: ORTHOPAEDIC SURGERY

## 2021-12-30 PROCEDURE — 3074F PR MOST RECENT SYSTOLIC BLOOD PRESSURE < 130 MM HG: ICD-10-PCS | Mod: CPTII,S$GLB,, | Performed by: ORTHOPAEDIC SURGERY

## 2021-12-30 PROCEDURE — 3074F SYST BP LT 130 MM HG: CPT | Mod: CPTII,S$GLB,, | Performed by: ORTHOPAEDIC SURGERY

## 2021-12-30 PROCEDURE — 99999 PR PBB SHADOW E&M-EST. PATIENT-LVL IV: CPT | Mod: PBBFAC,,, | Performed by: ORTHOPAEDIC SURGERY

## 2021-12-30 PROCEDURE — 1160F PR REVIEW ALL MEDS BY PRESCRIBER/CLIN PHARMACIST DOCUMENTED: ICD-10-PCS | Mod: CPTII,S$GLB,, | Performed by: ORTHOPAEDIC SURGERY

## 2021-12-30 PROCEDURE — 1160F RVW MEDS BY RX/DR IN RCRD: CPT | Mod: CPTII,S$GLB,, | Performed by: ORTHOPAEDIC SURGERY

## 2021-12-30 PROCEDURE — 3008F BODY MASS INDEX DOCD: CPT | Mod: CPTII,S$GLB,, | Performed by: ORTHOPAEDIC SURGERY

## 2021-12-30 PROCEDURE — 20610 DRAIN/INJ JOINT/BURSA W/O US: CPT | Mod: 50,S$GLB,, | Performed by: ORTHOPAEDIC SURGERY

## 2021-12-30 PROCEDURE — 3044F HG A1C LEVEL LT 7.0%: CPT | Mod: CPTII,S$GLB,, | Performed by: ORTHOPAEDIC SURGERY

## 2021-12-30 PROCEDURE — 3078F PR MOST RECENT DIASTOLIC BLOOD PRESSURE < 80 MM HG: ICD-10-PCS | Mod: CPTII,S$GLB,, | Performed by: ORTHOPAEDIC SURGERY

## 2021-12-30 PROCEDURE — 99214 PR OFFICE/OUTPT VISIT, EST, LEVL IV, 30-39 MIN: ICD-10-PCS | Mod: 25,S$GLB,, | Performed by: ORTHOPAEDIC SURGERY

## 2021-12-30 RX ORDER — TRIAMCINOLONE ACETONIDE 40 MG/ML
40 INJECTION, SUSPENSION INTRA-ARTICULAR; INTRAMUSCULAR
Status: DISCONTINUED | OUTPATIENT
Start: 2021-12-30 | End: 2021-12-30 | Stop reason: HOSPADM

## 2021-12-30 RX ADMIN — TRIAMCINOLONE ACETONIDE 40 MG: 40 INJECTION, SUSPENSION INTRA-ARTICULAR; INTRAMUSCULAR at 08:12

## 2022-01-18 ENCOUNTER — CLINICAL SUPPORT (OUTPATIENT)
Dept: REHABILITATION | Facility: HOSPITAL | Age: 37
End: 2022-01-18
Payer: COMMERCIAL

## 2022-01-18 DIAGNOSIS — M22.2X1 PATELLOFEMORAL ARTHRALGIA OF BOTH KNEES: ICD-10-CM

## 2022-01-18 DIAGNOSIS — M25.562 CHRONIC PAIN OF BOTH KNEES: ICD-10-CM

## 2022-01-18 DIAGNOSIS — M22.2X2 PATELLOFEMORAL ARTHRALGIA OF BOTH KNEES: ICD-10-CM

## 2022-01-18 DIAGNOSIS — M25.561 CHRONIC PAIN OF BOTH KNEES: ICD-10-CM

## 2022-01-18 DIAGNOSIS — G89.29 CHRONIC PAIN OF BOTH KNEES: ICD-10-CM

## 2022-01-18 PROCEDURE — 97110 THERAPEUTIC EXERCISES: CPT | Performed by: PHYSICAL THERAPIST

## 2022-01-18 PROCEDURE — 97161 PT EVAL LOW COMPLEX 20 MIN: CPT | Performed by: PHYSICAL THERAPIST

## 2022-01-18 NOTE — PROGRESS NOTES
Please see Treatment section for Initial Evaluation and Plan of Care  Hardik Blank, SPT  1/18/2022

## 2022-01-18 NOTE — PLAN OF CARE
[facility]  [place] [id number]  Physical Therapy Initial Evaluation    Name: Mela Blanco  Clinic Number: [id number]    Therapy Diagnosis:   Encounter Diagnoses   Name Primary?    Patellofemoral arthralgia of both knees     Chronic pain of both knees      Physician: Yung Harrington*    Physician Orders: PT Eval and Treat   Medical Diagnosis from Referral:   M22.2X1,M22.2X2 (ICD-10-CM) - Patellofemoral arthralgia of both knees   M25.561,M25.562,G89.29 (ICD-10-CM) - Chronic pain of both knees       Evaluation Date: 1/18/2022  Authorization Period Expiration: 12/30/2022  Plan of Care Expiration: 3/18/2022  Progress Note Due: 2/18/2022  Visit # / Visits authorized: 1/ 1   FOTO: Issued Visit # 1     Time In: 900  Time Out: 1000  Total Billable Time: 45 minutes    Precautions: Standard    Subjective   Date of onset: insidious onset 3+ years ago   History of current condition - Mela reports: that she has B OA in her knees that is at its worst first thing in the morning and at the end of walking her routes as a . 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 States that she feels that her R knee will occasionally hyper-extend when ascending stairs. States that he gets a CSI once a year in the winter time which usually lasts 6 months with most recent on 12/20/2021, states that once she gets moving in the morning it gets better and rest/ice/elevation at the end of the day helps with her symptoms    Past Medical History:   Diagnosis Date    Bariatric surgery status 5/25/2015    BMI 32.0-32.9,adult 11/27/2019    Current use of proton pump inhibitor 11/27/2019    IUD (intrauterine device) in place 11/27/2019    Miscarriage     NSAID induced gastritis 3/7/2018    Perennial allergic rhinitis 11/27/2019    Seborrheic dermatitis of scalp 6/23/2020    Vitamin D insufficiency 11/27/2019     Mela  Yolis Blanco  has a past surgical history that includes Laparoscopic gastric banding; breast reduction; hematoma surgery; Carpal tunnel release (Right, 01/12/2018); Cosmetic surgery; Carpal tunnel release (Left, 10/19/2018); Esophagogastroduodenoscopy (N/A, 8/27/2021); and Laparoscopic removal of gastric band (N/A, 9/30/2021).    Mela has a current medication list which includes the following prescription(s): caffeine, cetirizine, cholecalciferol (vitamin d3), paragard t 380a, fluticasone propionate, ketoconazole, omeprazole, omeprazole, ondansetron, pantoprazole, wegovy, wegovy, wegovy, wegovy, [START ON 2/13/2022] wegovy, topiramate, and ursodiol.    Review of patient's allergies indicates:  No Known Allergies     Pain:  Current 0/10, worst 7/10, best 0/10   Location: bilateral knee/suprapatellar/lateral joint line, and R posterolateral corner   Description: Aching, Dull, Deep and Sharp  Aggravating Factors: Walking, ascending stairs, Morning and Getting out of bed/chair  Easing Factors: relaxation, ice, lying down, injection, rest and elevation    Prior Therapy: Pt states they had therapy in 2020 but did not go long enough to see any benefit from it  Social History: In a 2 story home with steps to access and her bedroom is on the second floor, outside of work patients hobbies include watching tv and hanging around her house  Occupation: , used to be 6 days a week but has recently cut down to 5 days a week  Prior Level of Function: Independent in all ADLs  Current Level of Function: limited    Pts goals: Decrease pain to be able to perform her job    Objective     Observation: No swelling noted, forward shoulder/head posture    Range of Motion (extension/neutral/flexion):    Right Left   Knee PROM: 3 / 0 / 130 degrees 3 / 0 / 130 degrees     Flexibility:  (-) B 90-90 test, (+) modified derick test B iliopsoas    Strength:    Right Left Comment   Knee Extension: 4/5 4/5    Knee Flexion: 4/5  4/5    Hip Abduction: 4-/5 4-/5    Hip Adduction: 4-/5 4-/5    Hip Flexion: 3+/5 3+/5    Hip Extension: 4-/5 4-/5    Ankle DF 4+/5 4+/5    Ankle PF 4-/5 4-/5 Performed EOT     Special Tests: (-) B valgus/varus stress test, Lachmans, Ant/Post drawer and McMurrays    Palpation: Negative for heat present in B knee, decreased B quad tone      CMS Impairment/Limitation/Restriction for FOTO Survey    Therapist reviewed FOTO scores for Mela Blanco on 1/18/2022.   FOTO documents entered into [employer] - see Media section.    Limitation Score: 59%  Category: Mobility       TREATMENT   Treatment Time In: 930  Treatment Time Out: 1000  Total Treatment time separate from Evaluation time:15 min    Mela received therapeutic exercises to develop strength, endurance and core stabilization for 15 minutes including:  Quad sets 1x10:10  Butt winking 1x10:10  SLR 2x10:05  Sidelying hip abd 2x10  Single leg bridge with opp leg on table 2x10:05  LAQ 2x10 5#    Education     Home Exercises and Patient Education Provided    Education provided re:   - progress towards goals   - role of therapy in multi - disciplinary team, goals for therapy  No spiritual or educational barriers to learning provided    Written Home Exercises Provided: Yes.  Exercises were reviewed and Mela was able to demonstrate them prior to the end of the session.   Pt received a written copy of exercises to perform at home. Mela demonstrated good  understanding of the education provided.     Assessment   Mela is a 36 y.o. female referred to outpatient Physical Therapy with a medical diagnosis of B chronic knee pain. Pt presents with     Increased pain  Decreased strength  Activity limitation    Pt prognosis is Good.   Pt will benefit from skilled outpatient Physical Therapy to address the deficits stated above and in the chart below, provide pt/family education, and to maximize pt's level of independence.     Plan of care discussed with  patient: Yes  Pt's spiritual, cultural and educational needs considered and pt agreeable to plan of care and goals as stated below:     Anticipated Barriers for therapy: None    Medical Necessity is demonstrated by the following  History  Co-morbidities and personal factors that may impact the plan of care Co-morbidities:   high BMI    Personal Factors:   lifestyle     moderate   Examination  Body Structures and Functions, activity limitations and participation restrictions that may impact the plan of care Body Regions:   lower extremities    Body Systems:    strength  gross coordinated movement  balance  gait  motor control    Participation Restrictions:   None    Activity limitations:   Mobility  walking    Self care  no deficits    Domestic Life  no deficits    Community and Social Life  community life  recreation and leisure         moderate   Clinical Presentation evolving clinical presentation with changing clinical characteristics moderate   Decision Making/ Complexity Score: moderate     Goals   Short-Term Goals: 4  weeks  - The patient will be independent with initial home exercise program.  - The patient will increase strength by 1/2 muscle grade to perform ambulation and job duties with pain < 0/10.    Long-Term Goals: 8 weeks  - The patient will be independent with home exercise program and symptom management.  - The patient will increase strength by 1 muscle grade  to perform ambulation, job duties and  recreation/leisure activities   with pain < 0/10.  - The patient will be able to perform their weekly route as  with pain < 0/10.    Plan   Certification Period: 1/18/2022 to 3/18/2022.    Observe gait/stair navigation at follow up    Outpatient Physical Therapy 1 times weekly for 8 weeks to include the following interventions: patient education, Manual Therapy, Neuromuscular Re-ed, Patient Education, Self Care, Therapeutic Activities and Therapeutic Exercise.     Hardik Blank, SPT

## 2022-01-25 ENCOUNTER — CLINICAL SUPPORT (OUTPATIENT)
Dept: REHABILITATION | Facility: HOSPITAL | Age: 37
End: 2022-01-25
Payer: COMMERCIAL

## 2022-01-25 DIAGNOSIS — M25.562 CHRONIC PAIN OF BOTH KNEES: Primary | ICD-10-CM

## 2022-01-25 DIAGNOSIS — G89.29 CHRONIC PAIN OF BOTH KNEES: Primary | ICD-10-CM

## 2022-01-25 DIAGNOSIS — M25.561 CHRONIC PAIN OF BOTH KNEES: Primary | ICD-10-CM

## 2022-01-25 PROCEDURE — 97110 THERAPEUTIC EXERCISES: CPT | Performed by: PHYSICAL THERAPIST

## 2022-01-25 PROCEDURE — 97530 THERAPEUTIC ACTIVITIES: CPT | Performed by: PHYSICAL THERAPIST

## 2022-01-25 NOTE — PROGRESS NOTES
Physical Therapy Daily Treatment Note   [facility]      Visit Date: 1/25/2022    Name: Mela Blanco  Clinic Number: [id number]    Therapy Diagnosis:   Encounter Diagnosis   Name Primary?    Chronic pain of both knees Yes     Physician: Yung Harrington*    Physician Orders: PT Eval and Treat   Medical Diagnosis from Referral:   M22.2X1,M22.2X2 (ICD-10-CM) - Patellofemoral arthralgia of both knees   M25.561,M25.562,G89.29 (ICD-10-CM) - Chronic pain of both knees         Evaluation Date: 1/18/2022  Authorization Period Expiration: 12/30/2022  Plan of Care Expiration: 3/18/2022  Progress Note Due: 2/18/2022  Visit # / Visits authorized: 2/ 1   FOTO: Issued Visit # 1     Time In: 1500  Time Out: 1600  Total Billable Time: 45 minutes     Precautions: Standard    Subjective      Pt reports no change in her R knee condition after IE/HEP     she was compliant with home exercise program given last session.   Response to previous treatment:good   Functional change: none as yet     Pain: 0/10  Location: right knee      Objective     Measurements taken:  None this visit     Mela received therapeutic exercises to develop strength, endurance and core stabilization for 45 minutes including:    Stick upper / lower leg   Supine SLR 1x20:05 0# R/L   ;butt winking 1x10:10   B bridge 2x10:10  LLR 1x20:05 R/L 0#  Rev abs 2x10        Home Exercises Provided and Patient Education Provided     Education provided:   - none this visit     Written Home Exercises Provided: Patient instructed to cont prior HEP.  Exercises were reviewed and Mela was able to demonstrate them prior to the end of the session.  Mela demonstrated good  understanding of the education provided.     See EMR under hand out  for exercises provided prior visit.      Assessment     josh Rx without increase in sxs, very good training effect in quad and glut with above Caesar, pt demonstrated significant muscle weakness in glut and core      Mela Is progressing well towards her goals.   Pt prognosis is Good.     Pt will continue to benefit from skilled outpatient physical therapy to address the deficits listed in the problem list box on initial evaluation, provide pt/family education and to maximize pt's level of independence in the home and community environment.     Pt's spiritual, cultural and educational needs considered and pt agreeable to plan of care and goals.    Anticipated barriers to physical therapy: none    Goals:   Short-Term Goals: 4  weeks  - The patient will be independent with initial home exercise program.  - The patient will increase strength by 1/2 muscle grade to perform ambulation and job duties with pain < 0/10.     Long-Term Goals: 8 weeks  - The patient will be independent with home exercise program and symptom management.  - The patient will increase strength by 1 muscle grade  to perform ambulation, job duties and  recreation/leisure activities   with pain < 0/10.  - The patient will be able to perform their weekly route as  with pain < 0/10.      Plan     Continue with established Plan of Care towards PT goals with focus on decreasing pain, increasing ROM, strength, neuromuscular control and functional status       Quinn Machado, PT

## 2022-02-01 ENCOUNTER — CLINICAL SUPPORT (OUTPATIENT)
Dept: REHABILITATION | Facility: HOSPITAL | Age: 37
End: 2022-02-01
Payer: COMMERCIAL

## 2022-02-01 DIAGNOSIS — G89.29 CHRONIC PAIN OF BOTH KNEES: Primary | ICD-10-CM

## 2022-02-01 DIAGNOSIS — M25.561 CHRONIC PAIN OF BOTH KNEES: Primary | ICD-10-CM

## 2022-02-01 DIAGNOSIS — M25.562 CHRONIC PAIN OF BOTH KNEES: Primary | ICD-10-CM

## 2022-02-01 PROCEDURE — 97110 THERAPEUTIC EXERCISES: CPT | Performed by: PHYSICAL THERAPIST

## 2022-02-01 NOTE — PROGRESS NOTES
Physical Therapy Daily Treatment Note   [facility]      Visit Date: 2/1/2022    Name: Mela Blanco  Clinic Number: [id number]    Therapy Diagnosis:   No diagnosis found.  Physician: Yung Harrington*    Physician Orders: PT Eval and Treat   Medical Diagnosis from Referral:   M22.2X1,M22.2X2 (ICD-10-CM) - Patellofemoral arthralgia of both knees   M25.561,M25.562,G89.29 (ICD-10-CM) - Chronic pain of both knees         Evaluation Date: 1/18/2022  Authorization Period Expiration: 12/30/2022  Plan of Care Expiration: 3/18/2022  Progress Note Due: 2/18/2022  Visit # / Visits authorized: 3/ 1   FOTO: Issued Visit # 1     Time In: 1311  Time Out: 1428  Total Billable Time: 60 minutes     Precautions: Standard    Subjective      Pt reports states her knees were 30-40% at initial eval and feels that they currently are at 55-60%, states she feels that she has gotten a little stronger, had her long shift yesterday and was fatigued at the end but states it was a little decreased compared to normal     she was compliant with home exercise program given last session.   Response to previous treatment:good   Functional change: see subjective above    Pain: 0/10  Location: right knee      Objective     Measurements taken:  Observed stair navigation with no major deficits present    Mela received therapeutic exercises to develop strength, endurance and core stabilization for 60 minutes including:    Stick upper / lower leg   Stairs up/down x 6  Quad sets 1x10:10  Supine SLR 1x20:05 0# R/L   ;butt winking 1x10:10   U bridge with opp LE on table 2x10:05  LLR 3x10:05 R/L 0#  Clamshells 3xburn R/L orange  MLR 3x10:05 R/L 0#  Rev abs 3x10        Home Exercises Provided and Patient Education Provided     Education provided:   - technique for entering/exiting work truck     Written Home Exercises Provided: Patient instructed to cont prior HEP.  Exercises were reviewed and Mela was able to demonstrate them prior to  the end of the session.  Mela demonstrated good  understanding of the education provided.     See EMR under hand out  for exercises provided prior visit.      Assessment     josh Rx without increase in sxs, very good training effect in quad and glut with above TE, states she is starting to notice some improvement in her strength and ability to perform her work duties    Mela Is progressing well towards her goals.   Pt prognosis is Good.     Pt will continue to benefit from skilled outpatient physical therapy to address the deficits listed in the problem list box on initial evaluation, provide pt/family education and to maximize pt's level of independence in the home and community environment.     Pt's spiritual, cultural and educational needs considered and pt agreeable to plan of care and goals.    Anticipated barriers to physical therapy: none    Goals:   Short-Term Goals: 4  weeks  - The patient will be independent with initial home exercise program.  - The patient will increase strength by 1/2 muscle grade to perform ambulation and job duties with pain < 0/10.     Long-Term Goals: 8 weeks  - The patient will be independent with home exercise program and symptom management.  - The patient will increase strength by 1 muscle grade  to perform ambulation, job duties and  recreation/leisure activities   with pain < 0/10.  - The patient will be able to perform their weekly route as  with pain < 0/10.      Plan     Continue emphasis on strengthening quad/glute    Continue with established Plan of Care towards PT goals with focus on decreasing pain, increasing ROM, strength, neuromuscular control and functional status       Hardik Blank, SPT

## 2022-02-21 ENCOUNTER — OFFICE VISIT (OUTPATIENT)
Dept: BARIATRICS | Facility: CLINIC | Age: 37
End: 2022-02-21
Payer: COMMERCIAL

## 2022-02-21 VITALS
DIASTOLIC BLOOD PRESSURE: 64 MMHG | BODY MASS INDEX: 35.06 KG/M2 | SYSTOLIC BLOOD PRESSURE: 110 MMHG | HEART RATE: 91 BPM | WEIGHT: 217.19 LBS | OXYGEN SATURATION: 98 %

## 2022-02-21 DIAGNOSIS — E66.01 CLASS 2 SEVERE OBESITY DUE TO EXCESS CALORIES WITH SERIOUS COMORBIDITY AND BODY MASS INDEX (BMI) OF 35.0 TO 35.9 IN ADULT: Primary | ICD-10-CM

## 2022-02-21 PROCEDURE — 1160F PR REVIEW ALL MEDS BY PRESCRIBER/CLIN PHARMACIST DOCUMENTED: ICD-10-PCS | Mod: CPTII,S$GLB,, | Performed by: INTERNAL MEDICINE

## 2022-02-21 PROCEDURE — 99999 PR PBB SHADOW E&M-EST. PATIENT-LVL IV: ICD-10-PCS | Mod: PBBFAC,,, | Performed by: INTERNAL MEDICINE

## 2022-02-21 PROCEDURE — 99213 PR OFFICE/OUTPT VISIT, EST, LEVL III, 20-29 MIN: ICD-10-PCS | Mod: S$GLB,,, | Performed by: INTERNAL MEDICINE

## 2022-02-21 PROCEDURE — 99213 OFFICE O/P EST LOW 20 MIN: CPT | Mod: S$GLB,,, | Performed by: INTERNAL MEDICINE

## 2022-02-21 PROCEDURE — 3008F BODY MASS INDEX DOCD: CPT | Mod: CPTII,S$GLB,, | Performed by: INTERNAL MEDICINE

## 2022-02-21 PROCEDURE — 3074F PR MOST RECENT SYSTOLIC BLOOD PRESSURE < 130 MM HG: ICD-10-PCS | Mod: CPTII,S$GLB,, | Performed by: INTERNAL MEDICINE

## 2022-02-21 PROCEDURE — 3074F SYST BP LT 130 MM HG: CPT | Mod: CPTII,S$GLB,, | Performed by: INTERNAL MEDICINE

## 2022-02-21 PROCEDURE — 3078F PR MOST RECENT DIASTOLIC BLOOD PRESSURE < 80 MM HG: ICD-10-PCS | Mod: CPTII,S$GLB,, | Performed by: INTERNAL MEDICINE

## 2022-02-21 PROCEDURE — 3008F PR BODY MASS INDEX (BMI) DOCUMENTED: ICD-10-PCS | Mod: CPTII,S$GLB,, | Performed by: INTERNAL MEDICINE

## 2022-02-21 PROCEDURE — 99999 PR PBB SHADOW E&M-EST. PATIENT-LVL IV: CPT | Mod: PBBFAC,,, | Performed by: INTERNAL MEDICINE

## 2022-02-21 PROCEDURE — 1159F MED LIST DOCD IN RCRD: CPT | Mod: CPTII,S$GLB,, | Performed by: INTERNAL MEDICINE

## 2022-02-21 PROCEDURE — 1160F RVW MEDS BY RX/DR IN RCRD: CPT | Mod: CPTII,S$GLB,, | Performed by: INTERNAL MEDICINE

## 2022-02-21 PROCEDURE — 3078F DIAST BP <80 MM HG: CPT | Mod: CPTII,S$GLB,, | Performed by: INTERNAL MEDICINE

## 2022-02-21 PROCEDURE — 1159F PR MEDICATION LIST DOCUMENTED IN MEDICAL RECORD: ICD-10-PCS | Mod: CPTII,S$GLB,, | Performed by: INTERNAL MEDICINE

## 2022-02-21 RX ORDER — SEMAGLUTIDE 1.34 MG/ML
1 INJECTION, SOLUTION SUBCUTANEOUS
Qty: 3 PEN | Refills: 0 | Status: SHIPPED | OUTPATIENT
Start: 2022-02-21 | End: 2022-03-17 | Stop reason: SDUPTHER

## 2022-02-21 NOTE — PROGRESS NOTES
Subjective:       Patient ID: Mela Blanco is a 36 y.o. female.    Chief Complaint: Follow-up    Pt here today for follow-up. Has lost 1.2 lbs (-5.6 lbs muscle.+8 lbs fat). Was to add exercise, get back on track with bariatric diet and startedWegovy. She is on 2.4 mg. C/o fatigue, for which she is taking a caffiene pill.  Was previously on phentermine with Agus Florez MD. Last filled in April. She stopped the topiramate in past. Took phentermine without much weight loss in the past.    States she is doing aerobics 20 min 3 times a week.   s/p Lap Band removal with Dr Cullen on 9/30/2021.  She has had consult for gastric sleeve  Recent RD note reviewed.     New BMR: 1513  New PBF: 46.3%     Initial  BMR: 1574    PBF:  41.6%    Follow-up      Review of Systems      Objective:     /64   Pulse 91   Wt 98.5 kg (217 lb 3.2 oz)   SpO2 98%   BMI 35.06 kg/m²    Physical Exam  Vitals reviewed.   Constitutional:       General: She is not in acute distress.     Appearance: She is well-developed. She is obese.   HENT:      Head: Normocephalic and atraumatic.   Eyes:      General: No scleral icterus.     Pupils: Pupils are equal, round, and reactive to light.   Neck:      Thyroid: No thyromegaly.   Cardiovascular:      Rate and Rhythm: Normal rate.   Pulmonary:      Effort: Pulmonary effort is normal. No respiratory distress.   Musculoskeletal:         General: Normal range of motion.      Cervical back: Normal range of motion and neck supple.   Skin:     General: Skin is warm and dry.      Findings: No erythema.   Neurological:      Mental Status: She is alert and oriented to person, place, and time.      Cranial Nerves: No cranial nerve deficit.   Psychiatric:         Behavior: Behavior normal.         Judgment: Judgment normal.         Assessment:       1. Class 2 severe obesity due to excess calories with serious comorbidity and body mass index (BMI) of 35.0 to 35.9 in adult        Plan:            Mela was seen today for follow-up.    Diagnoses and all orders for this visit:    Class 2 severe obesity due to excess calories with serious comorbidity and body mass index (BMI) of 35.0 to 35.9 in adult  -     semaglutide (OZEMPIC) 1 mg/dose (4 mg/3 mL); Inject 1 mg into the skin every 7 days.         Start Ozempic once a week. Start with 0.25mg once a week x 4 weeks, then 0.5 mg weekly.       Decrease portions as soon as you start Ozempic. Avoid fried, greasy, fatty foods.     Some nausea in the first 2 weeks is not unusual.     If you get pain across the upper abdomen and around to your back, please call the office.       [email or web address] for coupon/videos.         Diet per RD    Add some type of resistance training 2-3 days a week. These can be body weight exercises, light weight or elastic bands. [employer] and [employer] are great sources for free work out plans and videos.       Exercise should be some cardiovascular and some resistance training        Exercise handout given

## 2022-02-21 NOTE — PATIENT INSTRUCTIONS
Start Ozempic once a week. Start 1 mg weekly.       Decrease portions as soon as you start Ozempic. Avoid fried, greasy, fatty foods.     Some nausea in the first 2 weeks is not unusual.     If you get pain across the upper abdomen and around to your back, please call the office.       Www.food.de for coupon/videos.         Diet per RD    Add some type of resistance training 2-3 days a week. These can be body weight exercises, light weight or elastic bands. Teads and Voolgo are great sources for free work out plans and videos.       Exercise should be some cardiovascular and some resistance training      Protein Content of Foods     Food Name Portion Calories Protein (gms)   Almonds (unsalted) 1/4 cup 160 6   [place] milk, unsweetened 1 cup 30  1   Beef, Roast 1 oz 46 8   Beef, Steak, sirloin, trimmed 1 oz 55 9   Catfish, broiled or baked 1 oz 30 5   Cheese, American FF 1 oz 40 6   Cheese, Cottage 1% fat ¼ cup 41 7   Cheese, Parmesan, grated ¼ cup 128 12   Cheese, Mozzarella, part skim 1 oz 78 8   Cheese, part skim Ricotta ¼ cup 90 8   Chicken, white breast w/o skin 1 oz 46 9   Chicken, leg w/o skin 1 oz 54 7   Crab, steamed ¼ cup  40 9   Crawfish tails, boiled ¼ cup 35 8   [place]ame, shelled ¼ cup 50 4   Egg 1 78 6   Ham, lean 5% 1 oz 44 7   Hamburger, lean 1 oz 56 7   Hummus ¼ cup 100 5   Lobster, steamed 1 oz 26 5   Milk, skim or 1%, soy  1 cup 90 8   Pork Tenderloin 1 oz 46 7   Pudding, SF 1 serv 60 2   Red beans ¼ cup 56 4   Refried beans, fat free ¼ cup 65 4   [place], baked 1 oz 52 7   Shrimp, steamed 1 oz 28 6   Soymilk, plain ½ cup 40 3   Tilapia, white fish, cooked 1 oz 36 8   Tofu ¼ cup 47 5   Trout 1 oz 48 7   Tuna, canned in water 1 oz 37 8   Turkey, white meat 1 oz 35 7   Veal Loin 1 oz 50 7   Yogurt, SF, frozen vanilla 3 oz 72 3.5   Yogurt, Fruit, FF, light 3 oz 40 2.5   Yogurt, Greek 3 oz 70 8     *Abbreviations: SF=sugar free, LF=low fat, FF= fat free, gms=grams  *3oz of cooked  meat/protein = size of deck of cards or ladies palm   *1oz cheese = 1inch cube or 1 slice American cheese      Lactose-free & Artificial Sweetener-free Protein Powders    Remember, your protein shake should have less than 4 grams of sugar per serving. For whey powders, choose 100% whey pro isolate, not a blend. Blends add creatine in as a filler.    Natural Zero-calorie sweeteners  Stevia  Truvia  Swerve (http://www.[employer]/about-swerve/)     Lactose-free Protein Powders  [facility] 100% Egg Protein (Vanilla & Chocolate)  [employer] Pro Performance 100% Soy Isolate (Vanilla & Chocolate)  Bluebonnet Protein Powder  Garden of Life ® raw organic protein  Isopure ® low carb protein powder  NutraBio ® 100% whey protein isolate  NutraBio ® organic plant protein - vegan  Now ® pea protein - vegan  Orgain ® Organic protein powder  Syntrax ® Nectar    Lactose-free Ready to drink Protein Shakes  Muscle Milk  [facility] Total Lean Lean Shake 25  Isopure ® zero carb   Protein 2 O  [facility] Nutrition Plan  Core Power      Artificial Sweetener-free Protein Powders  Pure Protein Natural Whey Protein Powder (Vanilla, Chocolate & Strawberry)  Bebo Kelly Whey Pro Isolate (sweetened w/ stevia) (Vanilla, Chocolate, Strawberry, Pina Coloda & Tropical Dreamsicle)  Nature's Best Natural Isopure - Unflavored (zero carb, found at [facility])    Lactose & Artificial-Sweetener-Free Protein Powders    Bebo Robin Egg White Protein (sweetened w/ stevia)  Garden of Life ® raw organic protein  Syntrax ® Nectar       Exercise should be some cardiovascular and some resistance training(see below).      STRENGTHENING  An adequate resistance training program could include the following types of exercise, focusing on the major muscle groups. One can use 5 to 10 pound dumbbells for those exercises that require the use of weight. At home, one can use a household item that provides a comfortable weight, such as a milk carton or beverage container. These exercises can also  be performed without weights. Add some type of resistance training 2-3 days a week. These can be body weight exercises, light weight or elastic bands. Chobani and [employer] are great sources for free work out plans and videos.       Chest (Bench Press): Hold the weights with your hands. Lying on a flat surface, with knees bent and feet flat, slowly bring the weights to the chest area with palms facing upward. Begin to exhale and press the weights up, fully extending the arms, and keeping them above your eyes. Inhale as you lower them to the starting position and repeat the movement.  Back (Bent-Over Row): Start by placing your feet shoulder-width apart.  a weight with each hand just outside the knees. Keeping the back straight and the knees flexed, slightly bend forward at the waist. Let the arms hang naturally while holding the weights. From this starting position, pull the weights to the lower abdomen, keeping the elbows close to the body. Exhale as you pull. Return to the starting position, inhaling as you go.  Arms (Bicep Curls): Hold a weight in each hand, with elbows at your sides, palms facing forward. The back should be straight, the chest flared outward. Begin to bend your right arm up first while exhaling, keeping the elbow totally stationary. Wait until the right arm goes completely down to the fully extended position, and then begin to curl the left arm. Each arm curled completes one full repetition.  Shoulders (Lateral Raises): Place the feet a few inches apart with the knees bent slightly. Keep the back erect as you lean forward slightly. With the weights in front of your thighs and palms facing together, begin to slowly raise them up to the side until parallel with the floor. Lower the weights to your thighs, and repeat.  Legs (Stiff Leg Dead Lift): Start by standing straight, holding the weights close to your sides, nearly touching your thighs. Keep the weights close to the body--this protects  "the back. The back must stay straight. Bend at the waist as far forward as you comfortably can while keeping your legs straight, and begin to feel the pull in your hamstrings as you lower the weights toward the ground. Slowly return to the starting position, keeping the back straight.  Legs (Dumbbell Lunge): Hold a weight in each hand, arms hanging at your sides. Step out with one leg, keeping the back straight. It is important to step out far enough so that the knee does not extend over the toe. This puts too much stress on the knee. Go down far enough so that the opposite knee nearly touches the ground. Keep this stance and repeat the lunging motion for several repetitions.  Abdominals: Do not perform standard sit-ups as these could hurt the lower back. Rather, focus on the following 2 exercises: (1) Obliques--Lie on your back with the knees flexed in the bent sit-up position. From this position, bring both knees down to the ground. With the back remaining flat, begin to flex your body toward your toes ("crunch"). Bring your shoulders up off the ground, but go slowly, controlling your momentum. Repeat this for 10 to 15 times. (2) Seated bench kicks/noris knives--Sit on the end of a bench or chair with the hands placed behind the buttocks. Begin to kick the legs outward with the knees bent slightly--at the same time, lean back to extend the torso. Come back to the beginning position and repeat this motion 10 to 15 times.         "

## 2022-03-07 ENCOUNTER — PATIENT MESSAGE (OUTPATIENT)
Dept: BARIATRICS | Facility: CLINIC | Age: 37
End: 2022-03-07
Payer: COMMERCIAL

## 2022-03-17 ENCOUNTER — PATIENT MESSAGE (OUTPATIENT)
Dept: BARIATRICS | Facility: CLINIC | Age: 37
End: 2022-03-17
Payer: COMMERCIAL

## 2022-03-17 DIAGNOSIS — E66.01 CLASS 2 SEVERE OBESITY DUE TO EXCESS CALORIES WITH SERIOUS COMORBIDITY AND BODY MASS INDEX (BMI) OF 35.0 TO 35.9 IN ADULT: ICD-10-CM

## 2022-03-17 RX ORDER — SEMAGLUTIDE 1.34 MG/ML
1 INJECTION, SOLUTION SUBCUTANEOUS
Qty: 3 PEN | Refills: 0 | Status: SHIPPED | OUTPATIENT
Start: 2022-03-17 | End: 2022-05-18

## 2022-03-21 ENCOUNTER — OFFICE VISIT (OUTPATIENT)
Dept: SPORTS MEDICINE | Facility: CLINIC | Age: 37
End: 2022-03-21
Payer: COMMERCIAL

## 2022-03-21 ENCOUNTER — HOSPITAL ENCOUNTER (OUTPATIENT)
Dept: RADIOLOGY | Facility: HOSPITAL | Age: 37
Discharge: HOME | End: 2022-03-21
Attending: PHYSICIAN ASSISTANT
Payer: COMMERCIAL

## 2022-03-21 VITALS
WEIGHT: 215 LBS | RESPIRATION RATE: 18 BRPM | HEART RATE: 88 BPM | DIASTOLIC BLOOD PRESSURE: 85 MMHG | HEIGHT: 66 IN | BODY MASS INDEX: 34.55 KG/M2 | TEMPERATURE: 98 F | SYSTOLIC BLOOD PRESSURE: 122 MMHG

## 2022-03-21 DIAGNOSIS — M25.562 CHRONIC PAIN OF BOTH KNEES: ICD-10-CM

## 2022-03-21 DIAGNOSIS — M25.561 CHRONIC PAIN OF BOTH KNEES: ICD-10-CM

## 2022-03-21 DIAGNOSIS — M23.91 PATELLAR MALALIGNMENT SYNDROME, RIGHT: Primary | ICD-10-CM

## 2022-03-21 DIAGNOSIS — G89.29 CHRONIC PAIN OF BOTH KNEES: ICD-10-CM

## 2022-03-21 PROCEDURE — 73564 X-RAY EXAM KNEE 4 OR MORE: CPT | Mod: TC,50

## 2022-03-21 PROCEDURE — 1159F PR MEDICATION LIST DOCUMENTED IN MEDICAL RECORD: ICD-10-PCS | Mod: CPTII,S$GLB,, | Performed by: PHYSICIAN ASSISTANT

## 2022-03-21 PROCEDURE — 3074F SYST BP LT 130 MM HG: CPT | Mod: CPTII,S$GLB,, | Performed by: PHYSICIAN ASSISTANT

## 2022-03-21 PROCEDURE — 99214 OFFICE O/P EST MOD 30 MIN: CPT | Mod: S$GLB,,, | Performed by: PHYSICIAN ASSISTANT

## 2022-03-21 PROCEDURE — 73564 XR KNEE ORTHO BILAT WITH FLEXION: ICD-10-PCS | Mod: 26,,, | Performed by: RADIOLOGY

## 2022-03-21 PROCEDURE — 3074F PR MOST RECENT SYSTOLIC BLOOD PRESSURE < 130 MM HG: ICD-10-PCS | Mod: CPTII,S$GLB,, | Performed by: PHYSICIAN ASSISTANT

## 2022-03-21 PROCEDURE — 1160F PR REVIEW ALL MEDS BY PRESCRIBER/CLIN PHARMACIST DOCUMENTED: ICD-10-PCS | Mod: CPTII,S$GLB,, | Performed by: PHYSICIAN ASSISTANT

## 2022-03-21 PROCEDURE — 1159F MED LIST DOCD IN RCRD: CPT | Mod: CPTII,S$GLB,, | Performed by: PHYSICIAN ASSISTANT

## 2022-03-21 PROCEDURE — 3079F DIAST BP 80-89 MM HG: CPT | Mod: CPTII,S$GLB,, | Performed by: PHYSICIAN ASSISTANT

## 2022-03-21 PROCEDURE — 99999 PR PBB SHADOW E&M-EST. PATIENT-LVL IV: ICD-10-PCS | Mod: PBBFAC,,, | Performed by: PHYSICIAN ASSISTANT

## 2022-03-21 PROCEDURE — 3008F BODY MASS INDEX DOCD: CPT | Mod: CPTII,S$GLB,, | Performed by: PHYSICIAN ASSISTANT

## 2022-03-21 PROCEDURE — 99999 PR PBB SHADOW E&M-EST. PATIENT-LVL IV: CPT | Mod: PBBFAC,,, | Performed by: PHYSICIAN ASSISTANT

## 2022-03-21 PROCEDURE — 3008F PR BODY MASS INDEX (BMI) DOCUMENTED: ICD-10-PCS | Mod: CPTII,S$GLB,, | Performed by: PHYSICIAN ASSISTANT

## 2022-03-21 PROCEDURE — 99214 PR OFFICE/OUTPT VISIT, EST, LEVL IV, 30-39 MIN: ICD-10-PCS | Mod: S$GLB,,, | Performed by: PHYSICIAN ASSISTANT

## 2022-03-21 PROCEDURE — 3079F PR MOST RECENT DIASTOLIC BLOOD PRESSURE 80-89 MM HG: ICD-10-PCS | Mod: CPTII,S$GLB,, | Performed by: PHYSICIAN ASSISTANT

## 2022-03-21 PROCEDURE — 1160F RVW MEDS BY RX/DR IN RCRD: CPT | Mod: CPTII,S$GLB,, | Performed by: PHYSICIAN ASSISTANT

## 2022-03-21 PROCEDURE — 73564 X-RAY EXAM KNEE 4 OR MORE: CPT | Mod: 26,,, | Performed by: RADIOLOGY

## 2022-03-21 NOTE — LETTER
Patient: Mela Blanco   YOB: 1985   Clinic Number: [id number]   Today's Date: March 21, 2022        Certificate to Return to Work     Mela  was seen by Leandra Patrick PA-C on 3/21/2022.    Please excuse her from work missed today while she was at her appointment.    If you have any questions or concerns, please feel free to contact the office at [phone].    Thank you.    Leandra Patrick PA-C        Signature: _____________________________

## 2022-03-21 NOTE — PROGRESS NOTES
Subjective:          Chief Complaint: Mela Blanco is a 36 y.o. female who had concerns including Pain of the Right Knee.    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 She reports right knee pain is worse than left. Pain is diffuse and worse with activity, specifically walking, pain is improved with rest. She uses a roller ice and ibuprofen intermittently. She feels a significant improvement in pain with PT and CSI injections and has been able to decrease the amount she walks for her job as well. Most recent aspiration and CSI bilateral knees by [facility] Harrington on 12/30/2021. She would like to hold off today on repeat injections as she reports still experiencing benefit. She feels PT has been very helpful regarding pain and is requesting a new referral today. She has decided to continue to wait on surgery as she feels PT and injections are managing the pain. No other concerns at this time.     Review of Systems   Constitutional: Negative. Negative for chills, fever, weight gain and weight loss.   HENT: Negative for congestion and sore throat.    Eyes: Negative for blurred vision and double vision.   Cardiovascular: Negative for chest pain, leg swelling and palpitations.   Respiratory: Negative for cough and shortness of breath.    Hematologic/Lymphatic: Does not bruise/bleed easily.   Skin: Negative for itching, poor wound healing and rash.   Musculoskeletal: Positive for joint pain. Negative for back pain, joint swelling, muscle weakness, myalgias and stiffness.   Gastrointestinal: Negative for abdominal pain, constipation, diarrhea, nausea and vomiting.   Genitourinary: Negative.  Negative for frequency and hematuria.   Neurological: Negative for dizziness, headaches, numbness, paresthesias and sensory change.   Psychiatric/Behavioral: Negative for altered mental status and depression. The patient is not nervous/anxious.     Allergic/Immunologic: Negative for hives.       Pain Related Questions  Over the past 3 days, what was your average pain during activity? (I.e. running, jogging, walking, climbing stairs, getting dressed, ect.): 3  Over the past 3 days, what was your highest pain level?: 5  Over the past 3 days, what was your lowest pain level? : 0    Other  How many nights a week are you awakened by your affected body part?: 0  Was the patient's HEIGHT measured or patient reported?: Patient Reported  Was the patient's WEIGHT measured or patient reported?: Measured      Objective:        General: Mela is well-developed, well-nourished, appears stated age, in no acute distress, alert and oriented to time, place and person.     General    Nursing note and vitals reviewed.  Constitutional: She is oriented to person, place, and time. She appears well-developed and well-nourished. No distress.   HENT:   Head: Normocephalic and atraumatic.   Nose: Nose normal.   Eyes: EOM are normal.   Cardiovascular: Normal rate and intact distal pulses.    Pulmonary/Chest: Effort normal. No respiratory distress.   Neurological: She is alert and oriented to person, place, and time.   Psychiatric: She has a normal mood and affect. Her behavior is normal. Judgment and thought content normal.     General Musculoskeletal Exam   Gait: normal       Right Knee Exam     Inspection   Erythema: absent  Scars: absent  Swelling: absent  Effusion: absent  Deformity: absent  Bruising: absent    Tenderness   The patient is tender to palpation of the lateral joint line.    Crepitus   The patient has crepitus of the patella.    Range of Motion   Extension: -5   Right knee flexion: 125.     Tests   Meniscus   Tracy:  Medial - negative Lateral - positive  Ligament Examination Lachman: normal (-1 to 2mm) PCL-Posterior Drawer: normal (0 to 2mm)     MCL - Valgus: normal (0 to 2mm)  LCL - Varus: normal  Patella   Patellar apprehension: negative  Passive Patellar  Tilt: neutral  Patellar Glide (quadrants): Lateral - 1   Medial - 2  Patellar Grind: positive    Other   Sensation: normal    Left Knee Exam     Inspection   Erythema: absent  Scars: absent  Swelling: absent  Effusion: absent  Deformity: absent  Bruising: absent    Tenderness   The patient tender to palpation of the lateral joint line.    Crepitus   The patient has crepitus of the patella.    Range of Motion   Extension:  -5 normal   Flexion: normal Left knee flexion: 125.     Tests   Meniscus   Tracy:  Medial - negative Lateral - negative  Stability Lachman: normal (-1 to 2mm) PCL-Posterior Drawer: normal (0 to 2mm)  MCL - Valgus: normal (0 to 2mm)  LCL - Varus: normal (0 to 2mm)  Posterolateral Corner: stable  Patella   Patellar apprehension: negative  Passive Patellar Tilt: neutral  Patellar Glide (Quadrants): Lateral - 2 and 1 Medial - 2  Patellar Grind: positive    Other   Sensation: normal    Muscle Strength   Right Lower Extremity   Quadriceps:  5/5   Hamstrin/5   Left Lower Extremity   Quadriceps:  5/5   Hamstrin/5     Vascular Exam     Right Pulses  Dorsalis Pedis:      2+  Posterior Tibial:      2+        Left Pulses  Dorsalis Pedis:      2+  Posterior Tibial:      2+        Edema  Right Lower Leg: absent  Left Lower Leg: absent      RADIOGRAPHS:  Bilateral knee ortho with flexion 3/21/22:  FINDINGS:  Four views bilateral.     Right: No fracture dislocation bone destruction or OCD seen.     Left: No fracture dislocation bone destruction or OCD seen.      CT right knee 2021  FINDINGS:  There is no evidence of fracture or dislocation.  There is minimal lateral tibiofemoral joint space narrowing with marginal osteophyte formation as well as minimal lateral patellofemoral joint space narrowing.  Small volume joint effusion.  Approximately 6 mm lateral subluxation of the patella.  Tiny So's cyst.  Subchondral cystic degeneration at the tibial spines.  Prominence of the ACL correlating with  finding of mucoid degeneration on prior MRI.  Bony mineralization is normal.     TT-TG distance measures 12 mm (series 2, image 526).     Impression:     CT right knee:     TT-TG distance within normal limits.     Mild lateral patellar subluxation.  Minimal degenerative changes.     Small joint effusion.        Assessment:       Encounter Diagnoses   Name Primary?    Chronic pain of both knees Yes    Patellar malalignment syndrome, right           Plan:         1. We discussed at length different treatment options including conservative vs surgical management. These include anti-inflammatories, acetaminophen, rest, ice, heat, formal physical therapy including strengthening and stretching exercises, home exercise programs, dry needling, CSI, and finally surgical intervention.      2. Patient decided to continue with conservative management. Continue PT-new referral placed today at [facility] for patellofemoral and quad strengthening program    3. I made the decision to obtain old records of the patient including previous notes and imaging. New imaging was ordered today of the extremity or extremities evaluated. I independently reviewed and interpreted the radiographs and/or MRIs today as well as prior imaging. Xrays reviewed in detail with patient.     4. NSAIDS prn pain     5. RTC prn          Patient questionnaires may have been collected.

## 2022-03-24 ENCOUNTER — PATIENT MESSAGE (OUTPATIENT)
Dept: OPTOMETRY | Facility: CLINIC | Age: 37
End: 2022-03-24
Payer: COMMERCIAL

## 2022-03-24 ENCOUNTER — PATIENT MESSAGE (OUTPATIENT)
Dept: SPORTS MEDICINE | Facility: CLINIC | Age: 37
End: 2022-03-24
Payer: COMMERCIAL

## 2022-03-24 ENCOUNTER — TELEPHONE (OUTPATIENT)
Dept: OPTOMETRY | Facility: CLINIC | Age: 37
End: 2022-03-24
Payer: COMMERCIAL

## 2022-03-24 NOTE — TELEPHONE ENCOUNTER
----- Message from Faviola Ornelas sent at 3/24/2022  2:03 PM CDT -----  VIANEY Browne Dr[address]       I reordered my contacts today, but waited until the last minute to do so. I'm completely out and checked if there were samples I could grab until my order comes in, but they are completely out of my current prescription. Is it possible I could get -1.25 samples to hold me over a couple of days please. The representative told me she could not do so without the doctors approval. If so, can I please pick them up at your office or [facility]. Thanks!

## 2022-04-07 ENCOUNTER — PATIENT MESSAGE (OUTPATIENT)
Dept: BARIATRICS | Facility: CLINIC | Age: 37
End: 2022-04-07
Payer: COMMERCIAL

## 2022-04-12 ENCOUNTER — PATIENT MESSAGE (OUTPATIENT)
Dept: BARIATRICS | Facility: CLINIC | Age: 37
End: 2022-04-12
Payer: COMMERCIAL

## 2022-04-18 ENCOUNTER — TELEPHONE (OUTPATIENT)
Dept: BARIATRICS | Facility: CLINIC | Age: 37
End: 2022-04-18
Payer: COMMERCIAL

## 2022-04-18 NOTE — TELEPHONE ENCOUNTER
----- Message from Katarina Viera sent at 4/6/2022  1:28 PM CDT -----  Regarding: work up for Sleeve  What exactly is left in the work up for her Sleeve surgery?

## 2022-04-18 NOTE — TELEPHONE ENCOUNTER
LIO Lucero plan.     Continue revision work up per insurance prior to completing gastric sleeve.   - Psych evaluation  - MSDs per insurance requirements.  - RTC per post op schedule.  - No restrictions to exercise.  - Call the office for any issues.  - Check labs as scheduled.

## 2022-04-19 PROBLEM — M25.511 CHRONIC RIGHT SHOULDER PAIN: Status: ACTIVE | Noted: 2022-04-19

## 2022-04-19 PROBLEM — M75.01 ADHESIVE CAPSULITIS OF RIGHT SHOULDER: Status: ACTIVE | Noted: 2022-04-19

## 2022-04-19 PROBLEM — G89.29 CHRONIC RIGHT SHOULDER PAIN: Status: ACTIVE | Noted: 2022-04-19

## 2022-04-22 PROBLEM — Z86.69 HISTORY OF MIGRAINE HEADACHES: Chronic | Status: ACTIVE | Noted: 2022-04-22

## 2022-04-22 PROBLEM — Z87.898 HISTORY OF VERTIGO: Chronic | Status: ACTIVE | Noted: 2022-04-22

## 2022-04-22 PROBLEM — M25.512 CHRONIC PAIN OF BOTH SHOULDERS: Status: ACTIVE | Noted: 2022-04-19

## 2022-04-28 ENCOUNTER — TELEPHONE (OUTPATIENT)
Dept: BARIATRICS | Facility: CLINIC | Age: 37
End: 2022-04-28
Payer: COMMERCIAL

## 2022-04-28 NOTE — TELEPHONE ENCOUNTER
Attempt to call patient to inform her about her requirements for her surgery workup. No answer left detailed voicemail with a call back number.

## 2022-05-05 ENCOUNTER — PATIENT MESSAGE (OUTPATIENT)
Dept: BARIATRICS | Facility: CLINIC | Age: 37
End: 2022-05-05
Payer: COMMERCIAL

## 2022-05-10 ENCOUNTER — PATIENT MESSAGE (OUTPATIENT)
Dept: BARIATRICS | Facility: CLINIC | Age: 37
End: 2022-05-10
Payer: COMMERCIAL

## 2022-05-11 ENCOUNTER — TELEPHONE (OUTPATIENT)
Dept: BARIATRICS | Facility: CLINIC | Age: 37
End: 2022-05-11
Payer: COMMERCIAL

## 2022-05-11 NOTE — TELEPHONE ENCOUNTER
Spoke with pt, explained we needed to schedule a 2022 phone appointment with [facility] to update insurance requirements to complete appropriate workup for a Sleeve. Patient did not want to schedule at this time, she stated she started workup with another program and wanted to check with them, and will let us know. Instructed pt to reach out to us if we could be of further assistance.

## 2022-05-18 ENCOUNTER — PATIENT MESSAGE (OUTPATIENT)
Dept: BARIATRICS | Facility: CLINIC | Age: 37
End: 2022-05-18
Payer: COMMERCIAL

## 2022-05-18 RX ORDER — SEMAGLUTIDE 2.4 MG/.75ML
2.4 INJECTION, SOLUTION SUBCUTANEOUS
Qty: 3 ML | Refills: 1 | Status: SHIPPED | OUTPATIENT
Start: 2022-05-18 | End: 2022-10-14

## 2022-05-31 ENCOUNTER — OFFICE VISIT (OUTPATIENT)
Dept: INTERNAL MEDICINE | Facility: CLINIC | Age: 37
End: 2022-05-31
Payer: COMMERCIAL

## 2022-05-31 VITALS
HEART RATE: 89 BPM | DIASTOLIC BLOOD PRESSURE: 70 MMHG | OXYGEN SATURATION: 97 % | TEMPERATURE: 99 F | WEIGHT: 241.38 LBS | HEIGHT: 63 IN | BODY MASS INDEX: 42.77 KG/M2 | SYSTOLIC BLOOD PRESSURE: 130 MMHG

## 2022-05-31 DIAGNOSIS — Z20.822 CLOSE EXPOSURE TO COVID-19 VIRUS: Primary | ICD-10-CM

## 2022-05-31 LAB
CTP QC/QA: YES
SARS-COV-2 RDRP RESP QL NAA+PROBE: NEGATIVE

## 2022-05-31 PROCEDURE — 99213 OFFICE O/P EST LOW 20 MIN: CPT | Mod: S$GLB,,, | Performed by: PHYSICIAN ASSISTANT

## 2022-05-31 PROCEDURE — 3075F PR MOST RECENT SYSTOLIC BLOOD PRESS GE 130-139MM HG: ICD-10-PCS | Mod: CPTII,S$GLB,, | Performed by: PHYSICIAN ASSISTANT

## 2022-05-31 PROCEDURE — U0002: ICD-10-PCS | Mod: QW,S$GLB,, | Performed by: PHYSICIAN ASSISTANT

## 2022-05-31 PROCEDURE — 99213 PR OFFICE/OUTPT VISIT, EST, LEVL III, 20-29 MIN: ICD-10-PCS | Mod: S$GLB,,, | Performed by: PHYSICIAN ASSISTANT

## 2022-05-31 PROCEDURE — 1159F MED LIST DOCD IN RCRD: CPT | Mod: CPTII,S$GLB,, | Performed by: PHYSICIAN ASSISTANT

## 2022-05-31 PROCEDURE — U0002 COVID-19 LAB TEST NON-CDC: HCPCS | Mod: QW,S$GLB,, | Performed by: PHYSICIAN ASSISTANT

## 2022-05-31 PROCEDURE — 3075F SYST BP GE 130 - 139MM HG: CPT | Mod: CPTII,S$GLB,, | Performed by: PHYSICIAN ASSISTANT

## 2022-05-31 PROCEDURE — 99999 PR PBB SHADOW E&M-EST. PATIENT-LVL IV: CPT | Mod: PBBFAC,,, | Performed by: PHYSICIAN ASSISTANT

## 2022-05-31 PROCEDURE — 3078F DIAST BP <80 MM HG: CPT | Mod: CPTII,S$GLB,, | Performed by: PHYSICIAN ASSISTANT

## 2022-05-31 PROCEDURE — 1160F RVW MEDS BY RX/DR IN RCRD: CPT | Mod: CPTII,S$GLB,, | Performed by: PHYSICIAN ASSISTANT

## 2022-05-31 PROCEDURE — 1159F PR MEDICATION LIST DOCUMENTED IN MEDICAL RECORD: ICD-10-PCS | Mod: CPTII,S$GLB,, | Performed by: PHYSICIAN ASSISTANT

## 2022-05-31 PROCEDURE — 1160F PR REVIEW ALL MEDS BY PRESCRIBER/CLIN PHARMACIST DOCUMENTED: ICD-10-PCS | Mod: CPTII,S$GLB,, | Performed by: PHYSICIAN ASSISTANT

## 2022-05-31 PROCEDURE — 99999 PR PBB SHADOW E&M-EST. PATIENT-LVL IV: ICD-10-PCS | Mod: PBBFAC,,, | Performed by: PHYSICIAN ASSISTANT

## 2022-05-31 PROCEDURE — 3008F BODY MASS INDEX DOCD: CPT | Mod: CPTII,S$GLB,, | Performed by: PHYSICIAN ASSISTANT

## 2022-05-31 PROCEDURE — 3008F PR BODY MASS INDEX (BMI) DOCUMENTED: ICD-10-PCS | Mod: CPTII,S$GLB,, | Performed by: PHYSICIAN ASSISTANT

## 2022-05-31 PROCEDURE — 3078F PR MOST RECENT DIASTOLIC BLOOD PRESSURE < 80 MM HG: ICD-10-PCS | Mod: CPTII,S$GLB,, | Performed by: PHYSICIAN ASSISTANT

## 2022-05-31 NOTE — PROGRESS NOTES
Subjective:       Patient ID: Mela Blanco is a 36 y.o. female.        Chief Complaint: covid exposure    Mela Blanco is an established patient of Agus Florez MD here today for urgent care visit.    Her daughter started with sx 5/25 and tested positive for covid 3/30.    She works at the [employer] and has 3/3 covid vaccines.      Her sx started 5/28, body aches, really no other sx.  She wonders if she is really having body aches or just thinks she does.  No cough, fever, chest pain, shortness of breath.      She is here for a covid swab.    Covid risk score is 1.         Review of Systems   Constitutional: Negative for chills, diaphoresis, fatigue and fever.   HENT: Negative for congestion and sore throat.    Eyes: Negative for visual disturbance.   Respiratory: Negative for cough, chest tightness and shortness of breath.    Cardiovascular: Negative for chest pain, palpitations and leg swelling.   Gastrointestinal: Negative for abdominal pain, blood in stool, constipation, diarrhea, nausea and vomiting.   Genitourinary: Negative for dysuria, frequency, hematuria and urgency.   Musculoskeletal: Positive for myalgias. Negative for arthralgias and back pain.   Skin: Negative for rash.   Neurological: Negative for dizziness, syncope, weakness and headaches.   Psychiatric/Behavioral: Negative for dysphoric mood and sleep disturbance. The patient is not nervous/anxious.        Objective:      Physical Exam  Vitals and nursing note reviewed.   Constitutional:       Appearance: Normal appearance. She is well-developed.   HENT:      Head: Normocephalic.      Right Ear: External ear normal. No middle ear effusion.      Left Ear: External ear normal.  No middle ear effusion.      Nose: No mucosal edema.      Mouth/Throat:      Pharynx: Oropharynx is clear.   Eyes:      Pupils: Pupils are equal, round, and reactive to light.   Cardiovascular:      Rate and Rhythm: Normal rate and regular rhythm.  "     Heart sounds: Normal heart sounds. No murmur heard.    No friction rub. No gallop.   Pulmonary:      Effort: Pulmonary effort is normal. No respiratory distress.      Breath sounds: Normal breath sounds.   Abdominal:      Palpations: Abdomen is soft.      Tenderness: There is no abdominal tenderness.   Skin:     General: Skin is warm and dry.   Neurological:      Mental Status: She is alert.         Assessment:       1. Close exposure to COVID-19 virus        Plan:       Mela was seen today for covid exposure.    Diagnoses and all orders for this visit:    Close exposure to COVID-19 virus  -     POCT COVID-19 Rapid Screening    Covid test is negative  Work note provided  Retest in 2-3 days    Pt has been given instructions populated from [employer] database and has verbalized understanding of the after visit summary and information contained wherein.    Follow up with a primary care provider. May go to ER for acute shortness of breath, lightheadedness, fever, or any other emergent complaints or changes in condition.    "This note will be shared with the patient"    No future appointments.              "

## 2022-05-31 NOTE — LETTER
May 31, 2022    Mela Blanco  [address] [facility] [address] [facility] [address]         [facility]  [address]  [facility] [address]  Phone: [phone]  Fax: [phone] May 31, 2022     Patient: Mela Blanco   YOB: 1985   Date of Visit: 5/31/2022       To Whom It May Concern:    It is my medical opinion that Mela Blanco should remain out of work until 6/2/22.    If you have any questions or concerns, please don't hesitate to call.    Sincerely,        Karina Park PA-C

## 2022-06-10 ENCOUNTER — PATIENT MESSAGE (OUTPATIENT)
Dept: BARIATRICS | Facility: CLINIC | Age: 37
End: 2022-06-10
Payer: COMMERCIAL

## 2022-06-13 DIAGNOSIS — E66.09 CLASS 2 OBESITY DUE TO EXCESS CALORIES WITH BODY MASS INDEX (BMI) OF 35.0 TO 35.9 IN ADULT, UNSPECIFIED WHETHER SERIOUS COMORBIDITY PRESENT: ICD-10-CM

## 2022-06-13 DIAGNOSIS — Z98.84 BARIATRIC SURGERY STATUS: ICD-10-CM

## 2022-06-13 DIAGNOSIS — J30.89 PERENNIAL ALLERGIC RHINITIS: ICD-10-CM

## 2022-06-13 RX ORDER — CETIRIZINE HYDROCHLORIDE 10 MG/1
10 TABLET ORAL DAILY PRN
Qty: 90 TABLET | Refills: 0 | Status: SHIPPED | OUTPATIENT
Start: 2022-06-13 | End: 2023-01-11 | Stop reason: SDUPTHER

## 2022-06-13 RX ORDER — OMEPRAZOLE 40 MG/1
40 CAPSULE, DELAYED RELEASE ORAL EVERY MORNING
Qty: 90 CAPSULE | Refills: 0 | Status: SHIPPED | OUTPATIENT
Start: 2022-06-13 | End: 2023-04-09 | Stop reason: SDUPTHER

## 2022-06-13 RX ORDER — ACETAMINOPHEN 500 MG
2000 TABLET ORAL DAILY
Qty: 90 CAPSULE | Refills: 0 | Status: SHIPPED | OUTPATIENT
Start: 2022-06-13 | End: 2023-01-11 | Stop reason: SDUPTHER

## 2022-06-13 NOTE — TELEPHONE ENCOUNTER
Care Due:                  Date            Visit Type   Department     Provider  --------------------------------------------------------------------------------                                MYCHART                              ANNUAL                              CHECKUP/PHY  [facility] INTERNAL  Last Visit: 04-      [facility]       Agus Florez  Next Visit: None Scheduled  None         None Found                                                            Last  Test          Frequency    Reason                     Performed    Due Date  --------------------------------------------------------------------------------    Office Visit  12 months..  cetirizine, omeprazole...  04-   04-    [facility] Embedded Care Gaps. Reference number: [id number]. 6/13/2022   4:06:00 PM CDT

## 2022-06-13 NOTE — TELEPHONE ENCOUNTER
----- Message from Cecile Villegas sent at 6/13/2022  4:01 PM CDT -----  Contact: [phone]  Pt states she submitted refills for this and they were denied and she wants to know why please advise,,     Requesting an RX refill or new RX.  Is this a refill or new RX: refill  RX name and strength (copy/paste from chart): cetirizine (ZYRTEC) 10 MG tablet   Is this a 30 day or 90 day RX: 30  Pharmacy name and phone # (copy/paste from chart):    [facility] #[id number]  [place] [address] [facility]  AT [facility] & [address] [place] [address]  Phone: [phone] Fax: [phone]     The doctors have asked that we provide their patients with the following 2 reminders -- prescription refills can take up to 72 hours, and a friendly reminder that in the future you can use your [facility] account to request refills:yes     Requesting an RX refill or new RX.  Is this a refill or new RX: refill  RX name and strength (copy/paste from chart): omeprazole (PRILOSEC) 40 MG capsule   Is this a 30 day or 90 day RX: 30  Pharmacy name and phone # (copy/paste from chart):   [facility] #[id number] - [place] [facility]  AT [facility] & [address] [place] [address]  Phone: [phone] Fax: [phone]      The doctors have asked that we provide their patients with the following 2 reminders -- prescription refills can take up to 72 hours, and a friendly reminder that in the future you can use your [facility] account to request refills: yes     Requesting an RX refill or new RX.  Is this a refill or new RX: refill  RX name and strength (copy/paste from chart):  cholecalciferol, vitamin D3, (VITAMIN D3) 50 mcg (2,000 unit) Cap  Is this a 30 day or 90 day RX: 30  Pharmacy name and phone # (copy/paste from chart):   [facility] #[id number] - [place] - [address]  AT [facility] [address]  [address] [place] [address]  Phone: [phone]  Fax: [phone]      The doctors have asked that we provide their patients with the following 2 reminders -- prescription refills can take up to 72 hours, and a friendly reminder that in the future you can use your [facility] account to request refills: yes

## 2022-06-14 ENCOUNTER — PATIENT MESSAGE (OUTPATIENT)
Dept: BARIATRICS | Facility: CLINIC | Age: 37
End: 2022-06-14
Payer: COMMERCIAL

## 2022-06-20 NOTE — TELEPHONE ENCOUNTER
Rec'd Rx request for D3, Cetrizine + omeprazole from [facility].  LM for pt to return a call to us to schedule an appointment.

## 2022-06-27 PROBLEM — M25.572 PAIN OF JOINT OF LEFT ANKLE AND FOOT: Status: ACTIVE | Noted: 2022-06-27

## 2022-07-05 ENCOUNTER — PATIENT MESSAGE (OUTPATIENT)
Dept: BARIATRICS | Facility: CLINIC | Age: 37
End: 2022-07-05
Payer: COMMERCIAL

## 2022-07-31 ENCOUNTER — PATIENT MESSAGE (OUTPATIENT)
Dept: ADMINISTRATIVE | Facility: OTHER | Age: 37
End: 2022-07-31
Payer: COMMERCIAL

## 2022-08-01 ENCOUNTER — OFFICE VISIT (OUTPATIENT)
Dept: PRIMARY CARE CLINIC | Facility: CLINIC | Age: 37
End: 2022-08-01
Payer: COMMERCIAL

## 2022-08-01 DIAGNOSIS — U07.1 COVID-19: Primary | ICD-10-CM

## 2022-08-01 PROCEDURE — 99213 OFFICE O/P EST LOW 20 MIN: CPT | Mod: 95,,, | Performed by: FAMILY MEDICINE

## 2022-08-01 PROCEDURE — 99213 PR OFFICE/OUTPT VISIT, EST, LEVL III, 20-29 MIN: ICD-10-PCS | Mod: 95,,, | Performed by: FAMILY MEDICINE

## 2022-08-01 NOTE — PROGRESS NOTES
The patient location is: [place]   The chief complaint leading to consultation is: F/U COVID-19    Visit type: audiovisual    Face to Face time with patient: 12 min  20 minutes of total time spent on the encounter, which includes face to face time and non-face to face time preparing to see the patient (eg, review of tests), Obtaining and/or reviewing separately obtained history, Documenting clinical information in the electronic or other health record, Independently interpreting results (not separately reported) and communicating results to the patient/family/caregiver, or Care coordination (not separately reported).     Each patient to whom he or she provides medical services by telemedicine is:  (1) informed of the relationship between the physician and patient and the respective role of any other health care provider with respect to management of the patient; and (2) notified that he or she may decline to receive medical services by telemedicine and may withdraw from such care at any time.    Notes: See Below  Subjective:       Patient ID: Mela Blanco is a 36 y.o. female.    Chief Complaint: COVID-19 Concerns    36 y F pt, new to me, with PMH significant for h/o Obesity; laparoscopic gastric band and removal; perennial AR; Seb derm; and Vitamin D deficiency. She presents to f/u dx of COVID-19. She began feeling ill 07/25/2022. Evaluated at [facility] ED 07/27/2022 where she was diagnosed with COVID-19. No anti-viral or mab therapy rx'd. Symptoms improved; 75% better. Continues to have cognestion, altered smell/taste, and fatigue. Despite it being 5 days since onset of symptoms, she feels she cannot return to work. Works as . Has had COVID-19 vaccine + booster. Sudafed is not helpful.       Past Medical History:   Diagnosis Date    Bariatric surgery status 5/25/2015    BMI 32.0-32.9,adult 11/27/2019    Current use of proton pump inhibitor 11/27/2019    IUD (intrauterine  device) in place 11/27/2019    Miscarriage     NSAID induced gastritis 3/7/2018    Perennial allergic rhinitis 11/27/2019    Seborrheic dermatitis of scalp 6/23/2020    Vitamin D insufficiency 11/27/2019       Patient Active Problem List   Diagnosis    Bariatric surgery status    NSAID induced gastritis    IUD (intrauterine device) in place    Perennial allergic rhinitis    BMI 32.0-32.9,adult    Vitamin D insufficiency    Current use of proton pump inhibitor    Seborrheic dermatitis of scalp    Pain in both knees    Wears contact lenses    Dysphagia    Class 2 obesity due to excess calories with body mass index (BMI) of 35.0 to 35.9 in adult       Past Surgical History:   Procedure Laterality Date    breast reduction      CARPAL TUNNEL RELEASE Right 01/12/2018    CARPAL TUNNEL RELEASE Left 10/19/2018    Procedure: RELEASE, CARPAL TUNNEL;  Surgeon: Michael Agrawal Jr., MD;  Location: [facility];  Service: Plastics;  Laterality: Left;    COSMETIC SURGERY      ESOPHAGOGASTRODUODENOSCOPY N/A 8/27/2021    Procedure: EGD (ESOPHAGOGASTRODUODENOSCOPY);  Surgeon: Quinn Jensen MD;  Location: [address]);  Service: Endoscopy;  Laterality: N/A;  BMI 34.67-urgent case   Possible slipped band        will be out of town until day before-needs rapid, arrival time 8:30-GT    hematoma surgery      LAPAROSCOPIC GASTRIC BANDING      LAPAROSCOPIC REMOVAL OF GASTRIC BAND N/A 9/30/2021    Procedure: REMOVAL, GASTRIC BAND, LAPAROSCOPIC;  Surgeon: Abhilash Cullen Jr., MD;  Location: [address];  Service: General;  Laterality: N/A;       Family History   Problem Relation Age of Onset    Diabetes Mother     Hypertension Mother     Hyperlipidemia Mother     Glaucoma Mother     Arthritis Father     Hypertension Father     Diabetes Maternal Grandmother     Amblyopia Neg Hx     Blindness Neg Hx     Cancer Neg Hx     Cataracts Neg Hx     Macular degeneration Neg Hx     Retinal detachment  Neg Hx     Strabismus Neg Hx     Stroke Neg Hx     Thyroid disease Neg Hx        Social History     Tobacco Use   Smoking Status Never Smoker   Smokeless Tobacco Never Used       Social History     Social History Narrative    Single    In a relationship    1 daughter    Works for [employer].       Medications have been reviewed and reconciled.     Review of patient's allergies indicates:  No Known Allergies     Review of Systems   Constitutional: Positive for fatigue.   HENT: Positive for congestion.          Objective:        Vitals Unable to be Obtained    Physical Exam  Constitutional:       General: She is not in acute distress.     Appearance: Normal appearance.   HENT:      Head: Normocephalic and atraumatic.      Right Ear: External ear normal.      Left Ear: External ear normal.   Eyes:      General: No scleral icterus.     Extraocular Movements: Extraocular movements intact.      Conjunctiva/sclera: Conjunctivae normal.   Pulmonary:      Effort: Pulmonary effort is normal. No respiratory distress.      Comments: Speaking in full sentences without difficulty. No audible wheezing.  Musculoskeletal:         General: Normal range of motion.      Cervical back: Normal range of motion and neck supple.   Skin:     General: Skin is warm and dry.   Neurological:      Mental Status: She is alert and oriented to person, place, and time.   Psychiatric:         Mood and Affect: Mood normal.         Behavior: Behavior normal.         Assessment:       1. COVID-19        Plan:       Mela was seen today for covid-19 concerns.    Diagnoses and all orders for this visit:    COVID-19    Symptoms started 07/25/2022; positive test 07/27/2022. No antiviral/mab treatment. Continues to experience cognestion, altered smell/taste, and fatigue. Unable to return to work as  due to symptoms. Recommend pt remains off from work 08/01-08/05/2022 with return date 08/08/2022.   Flonase, Neti pot for nasal symptoms.        Follow up if symptoms worsen or fail to improve.

## 2022-08-01 NOTE — LETTER
August 1, 2022      [facility]  [address]  [facility] [address]  Phone: [phone]  Fax: [phone]       Patient: Mela Blanco   YOB: 1985  Date of Visit: 08/01/2022    To Whom It May Concern:    Ely Blanco  was at [facility] on 08/01/2022. She continues to have ongoing symptoms of COVID-19 at this time. The patient may return to work on August 8, 2022 with no restrictions. If you have any questions or concerns, or if I can be of further assistance, please do not hesitate to contact me.    Sincerely,          Deepak Heard MD

## 2022-08-02 ENCOUNTER — PATIENT MESSAGE (OUTPATIENT)
Dept: BARIATRICS | Facility: CLINIC | Age: 37
End: 2022-08-02
Payer: COMMERCIAL

## 2022-08-04 ENCOUNTER — PATIENT MESSAGE (OUTPATIENT)
Dept: BARIATRICS | Facility: CLINIC | Age: 37
End: 2022-08-04
Payer: COMMERCIAL

## 2022-09-07 ENCOUNTER — PATIENT MESSAGE (OUTPATIENT)
Dept: BARIATRICS | Facility: CLINIC | Age: 37
End: 2022-09-07
Payer: COMMERCIAL

## 2022-09-12 ENCOUNTER — TELEPHONE (OUTPATIENT)
Dept: BARIATRICS | Facility: CLINIC | Age: 37
End: 2022-09-12
Payer: COMMERCIAL

## 2022-09-12 NOTE — TELEPHONE ENCOUNTER
Returned pt call in regard to interest in a sooner consult for medical wl. Informed pt she has the soonest available consult at this time. First available is 12/1 with both medical wl providers.

## 2022-09-12 NOTE — TELEPHONE ENCOUNTER
----- Message from Sherine Marlow sent at 9/12/2022 12:29 PM CDT -----  Contact: pt  Pt is requesting call back re: Pt stated she was offered an appointment before her scheduled appt in October and would like to see if it is still available to reschedule her appt. Please contact pt to advise if appt is still available.     Confirmed contact below:  Contact Name: Mela Blanco  Phone Number: [phone]    .

## 2022-10-06 ENCOUNTER — PATIENT MESSAGE (OUTPATIENT)
Dept: BARIATRICS | Facility: CLINIC | Age: 37
End: 2022-10-06
Payer: COMMERCIAL

## 2022-10-14 ENCOUNTER — OFFICE VISIT (OUTPATIENT)
Dept: BARIATRICS | Facility: CLINIC | Age: 37
End: 2022-10-14
Payer: COMMERCIAL

## 2022-10-14 VITALS
SYSTOLIC BLOOD PRESSURE: 118 MMHG | HEIGHT: 63 IN | DIASTOLIC BLOOD PRESSURE: 85 MMHG | WEIGHT: 241.63 LBS | HEART RATE: 87 BPM | BODY MASS INDEX: 42.81 KG/M2 | OXYGEN SATURATION: 97 %

## 2022-10-14 DIAGNOSIS — Z71.3 ENCOUNTER FOR WEIGHT LOSS COUNSELING: ICD-10-CM

## 2022-10-14 DIAGNOSIS — E66.01 CLASS 3 SEVERE OBESITY DUE TO EXCESS CALORIES WITHOUT SERIOUS COMORBIDITY WITH BODY MASS INDEX (BMI) OF 40.0 TO 44.9 IN ADULT: Primary | ICD-10-CM

## 2022-10-14 PROBLEM — E66.813 CLASS 3 SEVERE OBESITY DUE TO EXCESS CALORIES WITHOUT SERIOUS COMORBIDITY WITH BODY MASS INDEX (BMI) OF 40.0 TO 44.9 IN ADULT: Status: ACTIVE | Noted: 2021-09-30

## 2022-10-14 PROCEDURE — 99999 PR PBB SHADOW E&M-EST. PATIENT-LVL IV: ICD-10-PCS | Mod: PBBFAC,,, | Performed by: STUDENT IN AN ORGANIZED HEALTH CARE EDUCATION/TRAINING PROGRAM

## 2022-10-14 PROCEDURE — 99213 PR OFFICE/OUTPT VISIT, EST, LEVL III, 20-29 MIN: ICD-10-PCS | Mod: S$GLB,,, | Performed by: STUDENT IN AN ORGANIZED HEALTH CARE EDUCATION/TRAINING PROGRAM

## 2022-10-14 PROCEDURE — 3079F DIAST BP 80-89 MM HG: CPT | Mod: CPTII,S$GLB,, | Performed by: STUDENT IN AN ORGANIZED HEALTH CARE EDUCATION/TRAINING PROGRAM

## 2022-10-14 PROCEDURE — 1160F RVW MEDS BY RX/DR IN RCRD: CPT | Mod: CPTII,S$GLB,, | Performed by: STUDENT IN AN ORGANIZED HEALTH CARE EDUCATION/TRAINING PROGRAM

## 2022-10-14 PROCEDURE — 1159F MED LIST DOCD IN RCRD: CPT | Mod: CPTII,S$GLB,, | Performed by: STUDENT IN AN ORGANIZED HEALTH CARE EDUCATION/TRAINING PROGRAM

## 2022-10-14 PROCEDURE — 99213 OFFICE O/P EST LOW 20 MIN: CPT | Mod: S$GLB,,, | Performed by: STUDENT IN AN ORGANIZED HEALTH CARE EDUCATION/TRAINING PROGRAM

## 2022-10-14 PROCEDURE — 99999 PR PBB SHADOW E&M-EST. PATIENT-LVL IV: CPT | Mod: PBBFAC,,, | Performed by: STUDENT IN AN ORGANIZED HEALTH CARE EDUCATION/TRAINING PROGRAM

## 2022-10-14 PROCEDURE — 3074F SYST BP LT 130 MM HG: CPT | Mod: CPTII,S$GLB,, | Performed by: STUDENT IN AN ORGANIZED HEALTH CARE EDUCATION/TRAINING PROGRAM

## 2022-10-14 PROCEDURE — 1159F PR MEDICATION LIST DOCUMENTED IN MEDICAL RECORD: ICD-10-PCS | Mod: CPTII,S$GLB,, | Performed by: STUDENT IN AN ORGANIZED HEALTH CARE EDUCATION/TRAINING PROGRAM

## 2022-10-14 PROCEDURE — 1160F PR REVIEW ALL MEDS BY PRESCRIBER/CLIN PHARMACIST DOCUMENTED: ICD-10-PCS | Mod: CPTII,S$GLB,, | Performed by: STUDENT IN AN ORGANIZED HEALTH CARE EDUCATION/TRAINING PROGRAM

## 2022-10-14 PROCEDURE — 3074F PR MOST RECENT SYSTOLIC BLOOD PRESSURE < 130 MM HG: ICD-10-PCS | Mod: CPTII,S$GLB,, | Performed by: STUDENT IN AN ORGANIZED HEALTH CARE EDUCATION/TRAINING PROGRAM

## 2022-10-14 PROCEDURE — 3079F PR MOST RECENT DIASTOLIC BLOOD PRESSURE 80-89 MM HG: ICD-10-PCS | Mod: CPTII,S$GLB,, | Performed by: STUDENT IN AN ORGANIZED HEALTH CARE EDUCATION/TRAINING PROGRAM

## 2022-10-14 RX ORDER — SEMAGLUTIDE 1.34 MG/ML
0.5 INJECTION, SOLUTION SUBCUTANEOUS
Qty: 1 PEN | Refills: 0 | Status: SHIPPED | OUTPATIENT
Start: 2022-10-14 | End: 2022-11-05

## 2022-10-14 NOTE — PATIENT INSTRUCTIONS
Food and Beverage Log:  Keep a log of all food and beverages that you consume.  Keeping track of calories will help you lose weight, because monitoring will help you become more aware of what you are eating and recognize your eating patterns.  Be mindful of your hunger level before eating and your satiety level after eating.  Just keeping records will help you make healthy changes in your diet and eat fewer calories.    Tips for keeping a calorie count:     Each day, aim for the daily calorie goal of 6836-6311 calories per day     Record your food intake immediately after eating. If possible, look up calories before or immediately after eating.     Download an serafin like PositiveID Fitness Pal, Lose It!, or CarFin (Code: 87630) To keep track of your calories.    Measuring foods (using measuring cups or spoons) will help you be more accurate with counting calories.     Keep a running calorie count throughout the day.     Count daily calories toward a weekly calorie total. If you eat too many calories one day, cut back slightly over the next few days to meet your weekly goal.       Meal Planning & Grocery Shopping    Meal planning builds the foundation for healthy eating. When you have structured ideas for healthy meals and foods available at home to prepare those meals, weight control becomes easier.  If only healthy foods are available at home, then you will be much more likely to eat healthy foods. And you will be less likely to go to a restaurant or  a fast food meal, which tend to be unhealthy and higher in calories than meals prepared at home.      Take 5-10 minutes each week to plan meals for the next 7 days.  Make a grocery list based on the meal plan.    Grocery Shopping Tips:  Shop on a full stomach.  Schedule your shopping for times when you are most motivated and able to be disciplined about your purchases. For example, after a stressful day at work it may be difficult to make the healthiest choices.  Shopping at other times, such as early in the morning or after dinner, may be easier.  Focus your shopping on the outside aisles of the store, which tend to contain more fresh foods and lower calorie foods. The inside aisles tend to have more processed foods.  Stick to your list. Avoid buying unhealthy items just because they are on sale.   Compare nutrition labels to check the number of calories and percentage of fat.      What to buy:    Vegetables  Fresh vegetables  Frozen vegetables with no sauce or added salt  Canned vegetables with no sauce or added salt    Protein  Lean meats, such as chicken and turkey  Limit red meats, such as beef to no more than 1x/week  Limit processed meats, such as cold cuts, donaldson, sausage, and hot dogs. Look for brands that have no nitrites and are minimally processed. Consider turkey sausage or turkey donaldson.  Fish and Shellfish  Eggs  Dried beans  Canned beans (reduced sodium)    Fat  Use healthy oils, such as olive oil or canola oil, for cooking, salad dressings, etc.  Unflavored nuts and seeds  Nut butters (no added sugar)    Dairy  Yogurt (no sugar added)  Cheese  Low-fat milk  Unsweetened nondairy milks (almond milk, soy milk, etc)    Fruit  Fresh Fruit  Frozen fruit with no added sugar  Canned fruit with no added sugar  Dried fruit with no added sugar  100% fruit juice    Whole Grains  Single ingredient grains, such as oats, quinoa, brown rice  Whole-wheat pasta  Sprouted whole-grain bread    What to avoid:  - Avoid fried foods  - Avoid foods with added sugar  - Avoid sugar-sweetened beverages  - Avoid ultra-processed foods            Copyright © 2011, [facility]. For more information about The Healthy Eating Plate, please see The Nutrition Source, Department of Nutrition, [employer], www.thenutritionsource.org, and [employer], www.health.[employer].edu.      Lifestyle Activity    Lifestyle activity consists of all the  activities that burn calories during the course of a normal day. Using the stairs, washing the dishes, or even getting up to turn off the television are all examples of lifestyle activity. All activities, no matter how small, burn calories. Increasing lifestyle activity can help with weight control, so building physical activity into your everyday routine is important.     A pedometer is a tool that can help you track how much lifestyle activity you are getting. It can help you stay active. A pedometer counts each step you take and displays your total steps on the screen. Many smartphones, including iPhones and Androids, have applications that automatically count your steps. If you decide to use this method, make sure you are holding or wearing your smartphone so it can count all of your steps.     During the next 2 weeks, aim for 5,000 or more steps per day. Each week aim to increase your daily step goal by 250 so that you will reach an average of 10,000 steps per day (equal to walking 4 to 5 miles).     Start making more active choices in your routine in order to increase the amount of walking you do each day.     Strategies to Increase Lifestyle Activity:    Take several 5-10-minute walks during the day.   Set a reminder to take a 5 minute break from your desk every hour.   Choose the farthest entrance to a building that you are entering.   Host walking meetings at work.   Walk down the rodriguez to contact co-workers face-to-face, instead of emailing or calling.  Walk to a restroom, water cooler, or copy machine on a different floor at work.   Walk during your lunch break.   Park farther away in parking lots.   Get off the bus or train earlier and walk farther to your destination.   Take the stairs rather than the elevator or the escalator.   Start a walking club with co-workers or neighbors.   Walk - don't drive - for trips less than one mile.   Take an after-dinner walk with family.   Go for a walk while talking  on your wireless phone.   Walk the dog more often.   If you prefer to stay indoors because of the weather, try walking in a shopping mall or doing laps around a large store.   Purchase a treadmill to use at home.   Schedule time for walking every week and stick to it like any other appointment.   Or think of your own strategy: _______________________________       Physical Activity    Physical activity improves your health and helps with weight control, especially weight loss maintenance.      When starting to incorporate an exercise routine into your day, it is helpful to set specific goals.  For example, I will walk at moderate intensity from 12:30-12:45pm on Monday, Wednesday, and Friday.  Schedule your exercise time into your calendar.  Think of any potential barriers that may come up and prevent you from exercising.  Develop solutions or back-up plans for when these barriers may arise.    Walking is an ideal activity to start with if you do not currently have an exercise routine. You can make the activity more fun by doing it with a friend or listening to music or a book on tape while you do it. If you don't enjoy walking, think of other activities you like, such as bike riding or swimming.  Other alternatives include group fitness classes or exercise at home using DVDs, Apps, etc.    If you are new to exercising, then start with 10 minutes 3-4 days per week.  After two weeks, increase to 15 minutes 3-4 days per week.  If you already exercise more than this, continue at your higher level, or increase by 10-15 minutes if able.         Aerobic Activity  Aerobic Activity gets you breathing harder and your heart beating faster.  Eventually, you should work up to 150 - 300 minutes of moderate-intensity aerobic activity every week or 75 - 150 minutes of vigorous-intensity aerobic activity every week.  An easy way to gauge the intensity of your activity is with the Talk Test.  During moderate activity, you should  be able to talk, but not sing without having to pause for a breath.  During vigorous activity, you shouldn't be able to say more than a few words without pausing for a breath.  You should not push yourself until you are completely out of breath, tired, or sore.    Examples of Aerobic Activity:  Walking  Running  Swimming  Biking  Playing sports like tennis or basketball  Dancing  Jumping Rope      Strength Training  It is also recommended that you perform muscle-strengthening activities at least 2 days per week.  Be sure to include activities that work all major muscle groups (legs, arms, abdomen, back, buttocks, chest, and shoulders)    Examples of Strength Training  Lifting weights  Working with resistance band  Using your body weight for resistance (squats, push-ups, sit-ups)  Pilates  Yoga      https://health.gov/moveyourway/activity-planner/      Remember to keep a record of your activity to track your progress.

## 2022-10-14 NOTE — PROGRESS NOTES
Subjective:       Patient ID: Mela Blanco is a 36 y.o. female.    Chief Complaint: Consult and Obesity    Patient presents for treatment of obesity.   Previously seen by Dr. Carnes.  Had been on Wegovy till coupon , then prescribed Ozempic.  Was on 1 mg, last dose about 2 months ago.  Has also been on phentermine for several years, prescription last filled 10/4/2022 per .  Lap band removed in       Co-morbidities     Negative for thyroid cancer    Current Physical Activity  Online exercise routines about 2x/week    Current Eating Habits  Breakfast - protein shakes with IsoPure protein  Lunch - salad with chicken; sandwich with turkey, swiss, lettuce, onions  Dinner - usually cooks, protein, vegetables, carbohydrate  Snacks - pork rinds, quest chips, PB&J, fruit  Beverages - crystal light, coke zero, water    Medical Weight Loss  10/14/2022: 241.6 lbs, BMI 39, BFP 50.1%,  lbs, SMM 67.7 lbs, BMR 1551 kcal    Review of Systems   Constitutional:  Negative for chills and fever.   Respiratory:  Negative for shortness of breath.    Cardiovascular:  Negative for chest pain and palpitations.   Gastrointestinal:  Negative for abdominal pain, nausea and vomiting.   Neurological:  Negative for dizziness and light-headedness.   Psychiatric/Behavioral:  The patient is not nervous/anxious.        Objective:       Latest Reference Range & Units 10/13/21 08:30 12/15/21 09:54   WBC 3.90 - 12.70 K/uL  3.28 (L)   RBC 4.00 - 5.40 M/uL  4.61   Hemoglobin 12.0 - 16.0 g/dL  13.6   Hematocrit 37.0 - 48.5 %  42.7   MCV 82 - 98 fL  93   MCH 27.0 - 31.0 pg  29.5   MCHC 32.0 - 36.0 g/dL  31.9 (L)   RDW 11.5 - 14.5 %  13.0   Platelets 150 - 450 K/uL  381   MPV 9.2 - 12.9 fL  9.4   Gran % 38.0 - 73.0 %  31.7 (L)   Lymph % 18.0 - 48.0 %  51.5 (H)   Mono % 4.0 - 15.0 %  12.5   Eosinophil % 0.0 - 8.0 %  3.7   Basophil % 0.0 - 1.9 %  0.6   Immature Granulocytes 0.0 - 0.5 %  0.0   Gran # (ANC) 1.8 - 7.7 K/uL  1.0  (L)   Lymph # 1.0 - 4.8 K/uL  1.7   Mono # 0.3 - 1.0 K/uL  0.4   Eos # 0.0 - 0.5 K/uL  0.1   Baso # 0.00 - 0.20 K/uL  0.02   Immature Grans (Abs) 0.00 - 0.04 K/uL  0.00   nRBC 0 /100 WBC  0   Differential Method   Automated   Iron 30 - 160 ug/dL  36   TIBC 250 - 450 ug/dL  395   Saturated Iron 20 - 50 %  9 (L)   Transferrin 200 - 375 mg/dL  267   Vitamin B-12 210 - 950 pg/mL 342 366   Sodium 136 - 145 mmol/L 137 136   Potassium 3.5 - 5.1 mmol/L 4.5 4.7   Chloride 95 - 110 mmol/L 104 106   CO2 23 - 29 mmol/L 21 (L) 21 (L)   Anion Gap 8 - 16 mmol/L 12 9   BUN 6 - 20 mg/dL 22 (H) 14   Creatinine 0.5 - 1.4 mg/dL 0.9 0.9   eGFR if non African American >60 mL/min/1.73 m^2 >60.0 >60.0   eGFR if African American >60 mL/min/1.73 m^2 >60.0 >60.0   Glucose 70 - 110 mg/dL 113 (H) 85   Calcium 8.7 - 10.5 mg/dL 9.5 9.7   Alkaline Phosphatase 55 - 135 U/L 48 (L) 56   PROTEIN TOTAL 6.0 - 8.4 g/dL 6.9 7.4   Albumin 3.5 - 5.2 g/dL 3.5 3.9   BILIRUBIN TOTAL 0.1 - 1.0 mg/dL 0.3 0.2   AST 10 - 40 U/L 14 15   ALT 10 - 44 U/L 28 20   Cholesterol 120 - 199 mg/dL 188 145   HDL 40 - 75 mg/dL 64 51   HDL/Cholesterol Ratio 20.0 - 50.0 % 34.0 35.2   LDL Cholesterol External 63.0 - 159.0 mg/dL 114.2 83.8   Non-HDL Cholesterol mg/dL 124 94   Total Cholesterol/HDL Ratio 2.0 - 5.0  2.9 2.8   Triglycerides 30 - 150 mg/dL 49 51   Thiamine 38 - 122 ug/L 52 64   Vit D, 25-Hydroxy 30 - 96 ng/mL  41   (L): Data is abnormally low  (H): Data is abnormally high    Vitals:    10/14/22 0946   BP: 118/85   Pulse: 87       Physical Exam  Vitals reviewed.   Constitutional:       General: She is not in acute distress.     Appearance: Normal appearance. She is obese. She is not ill-appearing, toxic-appearing or diaphoretic.   HENT:      Head: Normocephalic and atraumatic.   Cardiovascular:      Rate and Rhythm: Normal rate.   Pulmonary:      Effort: Pulmonary effort is normal. No respiratory distress.   Skin:     General: Skin is warm and dry.   Neurological:       Mental Status: She is alert and oriented to person, place, and time.   Psychiatric:         Mood and Affect: Mood normal.         Behavior: Behavior normal.       Assessment:       Problem List Items Addressed This Visit       Class 3 severe obesity due to excess calories without serious comorbidity with body mass index (BMI) of 40.0 to 44.9 in adult - Primary     Other Visit Diagnoses       Encounter for weight loss counseling                Plan:   - Ozempic 0.5 mg weekly for 4 weeks, then increase to 1 mg weekly    - Log all food and beverage intake with a daily calorie goal of 5734-7114 calories per day    - Moderate intensity aerobic exercise for 150 minutes per week

## 2022-11-04 ENCOUNTER — PATIENT MESSAGE (OUTPATIENT)
Dept: BARIATRICS | Facility: CLINIC | Age: 37
End: 2022-11-04
Payer: COMMERCIAL

## 2022-12-07 ENCOUNTER — PATIENT MESSAGE (OUTPATIENT)
Dept: BARIATRICS | Facility: CLINIC | Age: 37
End: 2022-12-07
Payer: COMMERCIAL

## 2022-12-16 ENCOUNTER — PATIENT MESSAGE (OUTPATIENT)
Dept: BARIATRICS | Facility: CLINIC | Age: 37
End: 2022-12-16
Payer: COMMERCIAL

## 2023-01-09 ENCOUNTER — PATIENT MESSAGE (OUTPATIENT)
Dept: BARIATRICS | Facility: CLINIC | Age: 38
End: 2023-01-09
Payer: COMMERCIAL

## 2023-01-11 ENCOUNTER — LAB VISIT (OUTPATIENT)
Dept: LAB | Facility: HOSPITAL | Age: 38
End: 2023-01-11
Attending: STUDENT IN AN ORGANIZED HEALTH CARE EDUCATION/TRAINING PROGRAM
Payer: COMMERCIAL

## 2023-01-11 ENCOUNTER — OFFICE VISIT (OUTPATIENT)
Dept: INTERNAL MEDICINE | Facility: CLINIC | Age: 38
End: 2023-01-11
Payer: COMMERCIAL

## 2023-01-11 VITALS
SYSTOLIC BLOOD PRESSURE: 112 MMHG | HEIGHT: 64 IN | WEIGHT: 237.63 LBS | BODY MASS INDEX: 40.57 KG/M2 | DIASTOLIC BLOOD PRESSURE: 86 MMHG | OXYGEN SATURATION: 98 % | HEART RATE: 105 BPM

## 2023-01-11 DIAGNOSIS — J30.89 PERENNIAL ALLERGIC RHINITIS: ICD-10-CM

## 2023-01-11 DIAGNOSIS — E55.9 VITAMIN D DEFICIENCY: ICD-10-CM

## 2023-01-11 DIAGNOSIS — D50.9 IRON DEFICIENCY ANEMIA, UNSPECIFIED IRON DEFICIENCY ANEMIA TYPE: ICD-10-CM

## 2023-01-11 DIAGNOSIS — D51.9 ANEMIA DUE TO VITAMIN B12 DEFICIENCY, UNSPECIFIED B12 DEFICIENCY TYPE: ICD-10-CM

## 2023-01-11 DIAGNOSIS — E66.01 CLASS 3 SEVERE OBESITY DUE TO EXCESS CALORIES WITHOUT SERIOUS COMORBIDITY WITH BODY MASS INDEX (BMI) OF 40.0 TO 44.9 IN ADULT: ICD-10-CM

## 2023-01-11 DIAGNOSIS — Z00.00 ANNUAL PHYSICAL EXAM: Primary | ICD-10-CM

## 2023-01-11 DIAGNOSIS — Z00.00 ANNUAL PHYSICAL EXAM: ICD-10-CM

## 2023-01-11 DIAGNOSIS — Z98.84 BARIATRIC SURGERY STATUS: ICD-10-CM

## 2023-01-11 LAB
25(OH)D3+25(OH)D2 SERPL-MCNC: 30 NG/ML (ref 30–96)
ALBUMIN SERPL BCP-MCNC: 4.1 G/DL (ref 3.5–5.2)
ALP SERPL-CCNC: 58 U/L (ref 55–135)
ALT SERPL W/O P-5'-P-CCNC: 25 U/L (ref 10–44)
ANION GAP SERPL CALC-SCNC: 12 MMOL/L (ref 8–16)
AST SERPL-CCNC: 16 U/L (ref 10–40)
BASOPHILS # BLD AUTO: 0.02 K/UL (ref 0–0.2)
BASOPHILS NFR BLD: 0.5 % (ref 0–1.9)
BILIRUB SERPL-MCNC: 0.5 MG/DL (ref 0.1–1)
BUN SERPL-MCNC: 13 MG/DL (ref 6–20)
CALCIUM SERPL-MCNC: 10.4 MG/DL (ref 8.7–10.5)
CHLORIDE SERPL-SCNC: 106 MMOL/L (ref 95–110)
CHOLEST SERPL-MCNC: 193 MG/DL (ref 120–199)
CHOLEST/HDLC SERPL: 3.3 {RATIO} (ref 2–5)
CO2 SERPL-SCNC: 23 MMOL/L (ref 23–29)
CREAT SERPL-MCNC: 1 MG/DL (ref 0.5–1.4)
DIFFERENTIAL METHOD: ABNORMAL
EOSINOPHIL # BLD AUTO: 0.1 K/UL (ref 0–0.5)
EOSINOPHIL NFR BLD: 1.6 % (ref 0–8)
ERYTHROCYTE [DISTWIDTH] IN BLOOD BY AUTOMATED COUNT: 13.9 % (ref 11.5–14.5)
EST. GFR  (NO RACE VARIABLE): >60 ML/MIN/1.73 M^2
ESTIMATED AVG GLUCOSE: 105 MG/DL (ref 68–131)
GLUCOSE SERPL-MCNC: 83 MG/DL (ref 70–110)
HBA1C MFR BLD: 5.3 % (ref 4–5.6)
HCT VFR BLD AUTO: 44.2 % (ref 37–48.5)
HDLC SERPL-MCNC: 58 MG/DL (ref 40–75)
HDLC SERPL: 30.1 % (ref 20–50)
HGB BLD-MCNC: 13.4 G/DL (ref 12–16)
IMM GRANULOCYTES # BLD AUTO: 0.01 K/UL (ref 0–0.04)
IMM GRANULOCYTES NFR BLD AUTO: 0.2 % (ref 0–0.5)
IRON SERPL-MCNC: 70 UG/DL (ref 30–160)
LDLC SERPL CALC-MCNC: 126.2 MG/DL (ref 63–159)
LYMPHOCYTES # BLD AUTO: 2.4 K/UL (ref 1–4.8)
LYMPHOCYTES NFR BLD: 53.9 % (ref 18–48)
MCH RBC QN AUTO: 28.3 PG (ref 27–31)
MCHC RBC AUTO-ENTMCNC: 30.3 G/DL (ref 32–36)
MCV RBC AUTO: 93 FL (ref 82–98)
MONOCYTES # BLD AUTO: 0.4 K/UL (ref 0.3–1)
MONOCYTES NFR BLD: 9.2 % (ref 4–15)
NEUTROPHILS # BLD AUTO: 1.5 K/UL (ref 1.8–7.7)
NEUTROPHILS NFR BLD: 34.6 % (ref 38–73)
NONHDLC SERPL-MCNC: 135 MG/DL
NRBC BLD-RTO: 0 /100 WBC
PLATELET # BLD AUTO: 444 K/UL (ref 150–450)
PMV BLD AUTO: 9.3 FL (ref 9.2–12.9)
POTASSIUM SERPL-SCNC: 4.9 MMOL/L (ref 3.5–5.1)
PROT SERPL-MCNC: 7.9 G/DL (ref 6–8.4)
RBC # BLD AUTO: 4.74 M/UL (ref 4–5.4)
SATURATED IRON: 16 % (ref 20–50)
SODIUM SERPL-SCNC: 141 MMOL/L (ref 136–145)
T4 FREE SERPL-MCNC: 1.32 NG/DL (ref 0.71–1.51)
TOTAL IRON BINDING CAPACITY: 441 UG/DL (ref 250–450)
TRANSFERRIN SERPL-MCNC: 298 MG/DL (ref 200–375)
TRIGL SERPL-MCNC: 44 MG/DL (ref 30–150)
TSH SERPL DL<=0.005 MIU/L-ACNC: 0.64 UIU/ML (ref 0.4–4)
VIT B12 SERPL-MCNC: 539 PG/ML (ref 210–950)
WBC # BLD AUTO: 4.36 K/UL (ref 3.9–12.7)

## 2023-01-11 PROCEDURE — 3079F DIAST BP 80-89 MM HG: CPT | Mod: CPTII,S$GLB,, | Performed by: STUDENT IN AN ORGANIZED HEALTH CARE EDUCATION/TRAINING PROGRAM

## 2023-01-11 PROCEDURE — 80053 COMPREHEN METABOLIC PANEL: CPT | Performed by: STUDENT IN AN ORGANIZED HEALTH CARE EDUCATION/TRAINING PROGRAM

## 2023-01-11 PROCEDURE — 80061 LIPID PANEL: CPT | Performed by: STUDENT IN AN ORGANIZED HEALTH CARE EDUCATION/TRAINING PROGRAM

## 2023-01-11 PROCEDURE — 99395 PR PREVENTIVE VISIT,EST,18-39: ICD-10-PCS | Mod: S$GLB,,, | Performed by: STUDENT IN AN ORGANIZED HEALTH CARE EDUCATION/TRAINING PROGRAM

## 2023-01-11 PROCEDURE — 1160F PR REVIEW ALL MEDS BY PRESCRIBER/CLIN PHARMACIST DOCUMENTED: ICD-10-PCS | Mod: CPTII,S$GLB,, | Performed by: STUDENT IN AN ORGANIZED HEALTH CARE EDUCATION/TRAINING PROGRAM

## 2023-01-11 PROCEDURE — 83036 HEMOGLOBIN GLYCOSYLATED A1C: CPT | Performed by: STUDENT IN AN ORGANIZED HEALTH CARE EDUCATION/TRAINING PROGRAM

## 2023-01-11 PROCEDURE — 99395 PREV VISIT EST AGE 18-39: CPT | Mod: S$GLB,,, | Performed by: STUDENT IN AN ORGANIZED HEALTH CARE EDUCATION/TRAINING PROGRAM

## 2023-01-11 PROCEDURE — 99999 PR PBB SHADOW E&M-EST. PATIENT-LVL IV: ICD-10-PCS | Mod: PBBFAC,,, | Performed by: STUDENT IN AN ORGANIZED HEALTH CARE EDUCATION/TRAINING PROGRAM

## 2023-01-11 PROCEDURE — 3008F PR BODY MASS INDEX (BMI) DOCUMENTED: ICD-10-PCS | Mod: CPTII,S$GLB,, | Performed by: STUDENT IN AN ORGANIZED HEALTH CARE EDUCATION/TRAINING PROGRAM

## 2023-01-11 PROCEDURE — 36415 COLL VENOUS BLD VENIPUNCTURE: CPT | Performed by: STUDENT IN AN ORGANIZED HEALTH CARE EDUCATION/TRAINING PROGRAM

## 2023-01-11 PROCEDURE — 84439 ASSAY OF FREE THYROXINE: CPT | Performed by: STUDENT IN AN ORGANIZED HEALTH CARE EDUCATION/TRAINING PROGRAM

## 2023-01-11 PROCEDURE — 99999 PR PBB SHADOW E&M-EST. PATIENT-LVL IV: CPT | Mod: PBBFAC,,, | Performed by: STUDENT IN AN ORGANIZED HEALTH CARE EDUCATION/TRAINING PROGRAM

## 2023-01-11 PROCEDURE — 82607 VITAMIN B-12: CPT | Performed by: STUDENT IN AN ORGANIZED HEALTH CARE EDUCATION/TRAINING PROGRAM

## 2023-01-11 PROCEDURE — 3079F PR MOST RECENT DIASTOLIC BLOOD PRESSURE 80-89 MM HG: ICD-10-PCS | Mod: CPTII,S$GLB,, | Performed by: STUDENT IN AN ORGANIZED HEALTH CARE EDUCATION/TRAINING PROGRAM

## 2023-01-11 PROCEDURE — 84443 ASSAY THYROID STIM HORMONE: CPT | Performed by: STUDENT IN AN ORGANIZED HEALTH CARE EDUCATION/TRAINING PROGRAM

## 2023-01-11 PROCEDURE — 1160F RVW MEDS BY RX/DR IN RCRD: CPT | Mod: CPTII,S$GLB,, | Performed by: STUDENT IN AN ORGANIZED HEALTH CARE EDUCATION/TRAINING PROGRAM

## 2023-01-11 PROCEDURE — 1159F PR MEDICATION LIST DOCUMENTED IN MEDICAL RECORD: ICD-10-PCS | Mod: CPTII,S$GLB,, | Performed by: STUDENT IN AN ORGANIZED HEALTH CARE EDUCATION/TRAINING PROGRAM

## 2023-01-11 PROCEDURE — 85025 COMPLETE CBC W/AUTO DIFF WBC: CPT | Performed by: STUDENT IN AN ORGANIZED HEALTH CARE EDUCATION/TRAINING PROGRAM

## 2023-01-11 PROCEDURE — 3008F BODY MASS INDEX DOCD: CPT | Mod: CPTII,S$GLB,, | Performed by: STUDENT IN AN ORGANIZED HEALTH CARE EDUCATION/TRAINING PROGRAM

## 2023-01-11 PROCEDURE — 1159F MED LIST DOCD IN RCRD: CPT | Mod: CPTII,S$GLB,, | Performed by: STUDENT IN AN ORGANIZED HEALTH CARE EDUCATION/TRAINING PROGRAM

## 2023-01-11 PROCEDURE — 3074F SYST BP LT 130 MM HG: CPT | Mod: CPTII,S$GLB,, | Performed by: STUDENT IN AN ORGANIZED HEALTH CARE EDUCATION/TRAINING PROGRAM

## 2023-01-11 PROCEDURE — 3074F PR MOST RECENT SYSTOLIC BLOOD PRESSURE < 130 MM HG: ICD-10-PCS | Mod: CPTII,S$GLB,, | Performed by: STUDENT IN AN ORGANIZED HEALTH CARE EDUCATION/TRAINING PROGRAM

## 2023-01-11 PROCEDURE — 84466 ASSAY OF TRANSFERRIN: CPT | Performed by: STUDENT IN AN ORGANIZED HEALTH CARE EDUCATION/TRAINING PROGRAM

## 2023-01-11 PROCEDURE — 82306 VITAMIN D 25 HYDROXY: CPT | Performed by: STUDENT IN AN ORGANIZED HEALTH CARE EDUCATION/TRAINING PROGRAM

## 2023-01-11 RX ORDER — ACETAMINOPHEN 500 MG
2000 TABLET ORAL DAILY
Qty: 90 CAPSULE | Refills: 1 | Status: SHIPPED | OUTPATIENT
Start: 2023-01-11

## 2023-01-11 RX ORDER — MUPIROCIN 20 MG/G
OINTMENT TOPICAL 3 TIMES DAILY
Qty: 30 G | Refills: 0 | Status: SHIPPED | OUTPATIENT
Start: 2023-01-11

## 2023-01-11 RX ORDER — CETIRIZINE HYDROCHLORIDE 10 MG/1
10 TABLET ORAL DAILY PRN
Qty: 90 TABLET | Refills: 3 | Status: SHIPPED | OUTPATIENT
Start: 2023-01-11 | End: 2024-02-02 | Stop reason: SDUPTHER

## 2023-01-11 NOTE — PROGRESS NOTES
SUBJECTIVE     Chief Complaint   Patient presents with    Annual Exam       HPI  Mela Blanco is a very pleasant 37 y.o. female with medical diagnoses as listed in the medical history and problem list that presents for annual exam. Pt is establishing care with me today.    Pt is UTD on age appropriate CA screening.    Family, social, surgical Hx reviewed     Health Maintenance         Date Due Completion Date    COVID-19 Vaccine (6 - Booster for Pfizer series) 12/20/2021 10/25/2021    Influenza Vaccine (1) 09/01/2022 10/19/2021    Cervical Cancer Screening 07/11/2023 7/31/2019    Hemoglobin A1c (Diabetic Prevention Screening) 04/10/2024 4/10/2021    TETANUS VACCINE 10/18/2027 10/18/2017              Chronic Conditions:    Obesity:  BMI 40.79 in clinic today.  Patient follows regularly with bariatric at [facility].  History of lap band surgery.removed in 2021    Patient has history of vitamin-D, iron, and B12 deficiencies in the past.  Not currently on supplementation.  Due for repeat levels.    PAST MEDICAL HISTORY:  Past Medical History:   Diagnosis Date    Bariatric surgery status 5/25/2015    BMI 32.0-32.9,adult 11/27/2019    Current use of proton pump inhibitor 11/27/2019    IUD (intrauterine device) in place 11/27/2019    Miscarriage     NSAID induced gastritis 3/7/2018    Perennial allergic rhinitis 11/27/2019    Seborrheic dermatitis of scalp 6/23/2020    Vitamin D insufficiency 11/27/2019       PAST SURGICAL HISTORY:  Past Surgical History:   Procedure Laterality Date    breast reduction      CARPAL TUNNEL RELEASE Right 01/12/2018    CARPAL TUNNEL RELEASE Left 10/19/2018    Procedure: RELEASE, CARPAL TUNNEL;  Surgeon: Michael Agrawal Jr., MD;  Location: [facility];  Service: Plastics;  Laterality: Left;    COSMETIC SURGERY      ESOPHAGOGASTRODUODENOSCOPY N/A 8/27/2021    Procedure: EGD (ESOPHAGOGASTRODUODENOSCOPY);  Surgeon: Quinn Jensen MD;  Location: [address]);  Service: Endoscopy;   Laterality: N/A;  BMI 34.67-urgent case   Possible slipped band        will be out of town until day before-needs rapid, arrival time 8:30-GT    hematoma surgery      LAPAROSCOPIC GASTRIC BANDING      LAPAROSCOPIC REMOVAL OF GASTRIC BAND N/A 9/30/2021    Procedure: REMOVAL, GASTRIC BAND, LAPAROSCOPIC;  Surgeon: Abhilash Cullen Jr., MD;  Location: [facility] OR [address] [place] [address];  Service: General;  Laterality: N/A;       SOCIAL HISTORY:  Social History     Socioeconomic History    Marital status: Single    Number of children: 1   Tobacco Use    Smoking status: Never    Smokeless tobacco: Never   Substance and Sexual Activity    Alcohol use: Not Currently     Comment: occasional.    Drug use: No    Sexual activity: Yes     Partners: Male   Social History Narrative    Single    In a relationship    1 daughter    Works for [employer].       FAMILY HISTORY:  Family History   Problem Relation Age of Onset    Diabetes Mother     Hypertension Mother     Hyperlipidemia Mother     Glaucoma Mother     Arthritis Father     Hypertension Father     Diabetes Maternal Grandmother     Amblyopia Neg Hx     Blindness Neg Hx     Cancer Neg Hx     Cataracts Neg Hx     Macular degeneration Neg Hx     Retinal detachment Neg Hx     Strabismus Neg Hx     Stroke Neg Hx     Thyroid disease Neg Hx        ALLERGIES AND MEDICATIONS: updated and reviewed.  Review of patient's allergies indicates:  No Known Allergies  Current Outpatient Medications   Medication Sig Dispense Refill    caffeine 200 mg Tab Take 200 mg by mouth every 4 (four) hours as needed.      copper intrauterine device (PARAGARD T 380A) 380 square mm IUD 1 Intra Uterine Device by Intrauterine route once.      fluticasone propionate (FLONASE) 50 mcg/actuation nasal spray 2 sprays (100 mcg total) by Each Nostril route 2 (two) times daily. 16 g 12    omeprazole (PRILOSEC) 40 MG capsule Take 1 capsule (40 mg total) by mouth every morning. Open capsule and take with apple sauce 90 capsule 0  "   cetirizine (ZYRTEC) 10 MG tablet Take 1 tablet (10 mg total) by mouth daily as needed for Allergies. 90 tablet 3    cholecalciferol, vitamin D3, (VITAMIN D3) 50 mcg (2,000 unit) Cap capsule Take 1 capsule (2,000 Units total) by mouth once daily. 90 capsule 1    ketoconazole (NIZORAL) 2 % cream Apply topically 2 (two) times daily. (Patient not taking: Reported on 1/11/2023) 30 g 3    mupirocin (BACTROBAN) 2 % ointment Apply topically 3 (three) times daily. 30 g 0    ondansetron (ZOFRAN-ODT) 8 MG TbDL Dissolve 1 tablet (8 mg total) by mouth every 6 (six) hours as needed. (Patient not taking: Reported on 1/11/2023) 30 tablet 0    semaglutide (OZEMPIC) 1 mg/dose (4 mg/3 mL) Inject 1 mg into the skin every 7 days. for 12 doses (Patient not taking: Reported on 1/11/2023) 1 pen 2    ursodioL (ILSA FORTE) 500 MG tablet Crush one tablet daily for gall bladder. (Patient not taking: Reported on 1/11/2023) 90 tablet 1     No current facility-administered medications for this visit.       ROS  Review of Systems   Constitutional:  Negative for fever and weight loss.   Respiratory:  Negative for cough and shortness of breath.    Cardiovascular:  Negative for chest pain and palpitations.   Gastrointestinal:  Negative for abdominal pain, constipation, diarrhea, nausea and vomiting.   Genitourinary:  Negative for dysuria.   Musculoskeletal:  Negative for back pain and joint pain.   Skin:  Negative for rash.   Neurological:  Negative for dizziness, weakness and headaches.   Psychiatric/Behavioral:  Negative for depression. The patient is not nervous/anxious.        OBJECTIVE     Physical Exam  Vitals:    01/11/23 1314   BP: 112/86   Pulse: 105    Body mass index is 40.79 kg/m².  Weight: 107.8 kg (237 lb 10.5 oz)   Height: 5' 4" (162.6 cm)     Physical Exam  HENT:      Head: Normocephalic and atraumatic.      Nose: Nose normal.      Mouth/Throat:      Mouth: Mucous membranes are moist.      Pharynx: Oropharynx is clear.   Eyes:      " Extraocular Movements: Extraocular movements intact.      Conjunctiva/sclera: Conjunctivae normal.      Pupils: Pupils are equal, round, and reactive to light.   Cardiovascular:      Rate and Rhythm: Normal rate and regular rhythm.   Pulmonary:      Effort: Pulmonary effort is normal.      Breath sounds: Normal breath sounds.   Abdominal:      General: There is no distension.      Palpations: Abdomen is soft.      Tenderness: There is no abdominal tenderness.   Musculoskeletal:         General: No swelling. Normal range of motion.      Cervical back: Normal range of motion.      Right lower leg: No edema.      Left lower leg: No edema.   Skin:     General: Skin is warm.      Findings: No lesion or rash.   Neurological:      General: No focal deficit present.      Mental Status: She is alert and oriented to person, place, and time.      Motor: No weakness.   Psychiatric:         Mood and Affect: Mood normal.         Thought Content: Thought content normal.             ASSESSMENT     37 y.o. female with     1. Annual physical exam    2. Vitamin D deficiency    3. Iron deficiency anemia, unspecified iron deficiency anemia type    4. Anemia due to vitamin B12 deficiency, unspecified B12 deficiency type    5. Class 3 severe obesity due to excess calories without serious comorbidity with body mass index (BMI) of 40.0 to 44.9 in adult    6. Bariatric surgery status    7. Perennial allergic rhinitis        PLAN:     1. Annual physical exam  -     Hemoglobin A1C; Future; Expected date: 01/11/2023  -     T4, Free; Future; Expected date: 01/11/2023  -     TSH; Future; Expected date: 01/11/2023  -     Lipid Panel; Future; Expected date: 01/11/2023  -     Comprehensive Metabolic Panel; Future; Expected date: 01/11/2023  -     CBC Auto Differential; Future; Expected date: 01/11/2023  -     Vitamin D; Future; Expected date: 01/11/2023    2. Vitamin D deficiency  -     Vitamin D; Future; Expected date: 01/11/2023    3. Iron  deficiency anemia, unspecified iron deficiency anemia type  -     Iron and TIBC; Future; Expected date: 01/11/2023    4. Anemia due to vitamin B12 deficiency, unspecified B12 deficiency type  -     VITAMIN B12; Future; Expected date: 01/11/2023    5. Class 3 severe obesity due to excess calories without serious comorbidity with body mass index (BMI) of 40.0 to 44.9 in adult  BMI 40.79 in clinic today.  Patient working on dietary improvement in exercise.  Continue follow-up with bariatrics.     6. Bariatric surgery status  -     cholecalciferol, vitamin D3, (VITAMIN D3) 50 mcg (2,000 unit) Cap capsule; Take 1 capsule (2,000 Units total) by mouth once daily.  Dispense: 90 capsule; Refill: 1  History of lap band, status post removal 2021.  Currently following with Bariatric Department.    7. Perennial allergic rhinitis  -     cetirizine (ZYRTEC) 10 MG tablet; Take 1 tablet (10 mg total) by mouth daily as needed for Allergies.  Dispense: 90 tablet; Refill: 3    Other orders  -     mupirocin (BACTROBAN) 2 % ointment; Apply topically 3 (three) times daily.  Dispense: 30 g; Refill: 0        Discussed age and gender appropriate screenings at this visit and encouraged a healthy diet low in simple carbohydrates, and increased physical activity.  Counseled on medically appropriate vaccines based on age and current health condition.  Screening test reviewed and discussed with patient.      RTC in 1 year    Beatris Campbell MD  01/11/2023 1:22 PM

## 2023-01-27 ENCOUNTER — TELEPHONE (OUTPATIENT)
Dept: OPHTHALMOLOGY | Facility: CLINIC | Age: 38
End: 2023-01-27
Payer: COMMERCIAL

## 2023-02-03 ENCOUNTER — PATIENT MESSAGE (OUTPATIENT)
Dept: PRIMARY CARE CLINIC | Facility: CLINIC | Age: 38
End: 2023-02-03
Payer: COMMERCIAL

## 2023-02-03 DIAGNOSIS — J30.9 ALLERGIC RHINITIS, UNSPECIFIED SEASONALITY, UNSPECIFIED TRIGGER: Primary | ICD-10-CM

## 2023-02-03 RX ORDER — FLUTICASONE PROPIONATE 50 MCG
2 SPRAY, SUSPENSION (ML) NASAL 2 TIMES DAILY
Qty: 15.8 ML | Refills: 1 | Status: SHIPPED | OUTPATIENT
Start: 2023-02-03

## 2023-02-09 ENCOUNTER — PATIENT MESSAGE (OUTPATIENT)
Dept: BARIATRICS | Facility: CLINIC | Age: 38
End: 2023-02-09
Payer: COMMERCIAL

## 2023-02-14 ENCOUNTER — PATIENT MESSAGE (OUTPATIENT)
Dept: BARIATRICS | Facility: CLINIC | Age: 38
End: 2023-02-14
Payer: COMMERCIAL

## 2023-02-22 ENCOUNTER — PATIENT MESSAGE (OUTPATIENT)
Dept: BARIATRICS | Facility: CLINIC | Age: 38
End: 2023-02-22
Payer: COMMERCIAL

## 2023-03-07 ENCOUNTER — LAB VISIT (OUTPATIENT)
Dept: LAB | Facility: HOSPITAL | Age: 38
End: 2023-03-07
Payer: COMMERCIAL

## 2023-03-07 ENCOUNTER — TELEPHONE (OUTPATIENT)
Dept: BARIATRICS | Facility: CLINIC | Age: 38
End: 2023-03-07
Payer: COMMERCIAL

## 2023-03-07 DIAGNOSIS — Z13.29 SCREENING FOR THYROID DISORDER: ICD-10-CM

## 2023-03-07 DIAGNOSIS — R53.83 FATIGUE: Primary | ICD-10-CM

## 2023-03-07 DIAGNOSIS — Z13.0 SCREENING FOR IRON DEFICIENCY ANEMIA: ICD-10-CM

## 2023-03-07 DIAGNOSIS — Z79.899 ENCOUNTER FOR LONG-TERM (CURRENT) USE OF OTHER MEDICATIONS: ICD-10-CM

## 2023-03-07 DIAGNOSIS — E56.9 MULTIPLE VITAMIN DEFICIENCY DISEASE: ICD-10-CM

## 2023-03-07 DIAGNOSIS — Z13.21 SCREENING FOR MALNUTRITION: ICD-10-CM

## 2023-03-07 DIAGNOSIS — Z13.220 SCREENING FOR LIPOID DISORDERS: ICD-10-CM

## 2023-03-07 LAB
ALBUMIN SERPL BCP-MCNC: 3.7 G/DL (ref 3.5–5.2)
ALP SERPL-CCNC: 52 U/L (ref 55–135)
ALT SERPL W/O P-5'-P-CCNC: 17 U/L (ref 10–44)
ANION GAP SERPL CALC-SCNC: 8 MMOL/L (ref 8–16)
AST SERPL-CCNC: 14 U/L (ref 10–40)
BASOPHILS # BLD AUTO: 0.02 K/UL (ref 0–0.2)
BASOPHILS NFR BLD: 0.5 % (ref 0–1.9)
BILIRUB SERPL-MCNC: 0.4 MG/DL (ref 0.1–1)
BUN SERPL-MCNC: 16 MG/DL (ref 6–20)
CALCIUM SERPL-MCNC: 9.5 MG/DL (ref 8.7–10.5)
CHLORIDE SERPL-SCNC: 107 MMOL/L (ref 95–110)
CHOLEST SERPL-MCNC: 183 MG/DL (ref 120–199)
CHOLEST/HDLC SERPL: 3.1 {RATIO} (ref 2–5)
CO2 SERPL-SCNC: 23 MMOL/L (ref 23–29)
CREAT SERPL-MCNC: 0.8 MG/DL (ref 0.5–1.4)
DIFFERENTIAL METHOD: ABNORMAL
EOSINOPHIL # BLD AUTO: 0.1 K/UL (ref 0–0.5)
EOSINOPHIL NFR BLD: 3.1 % (ref 0–8)
ERYTHROCYTE [DISTWIDTH] IN BLOOD BY AUTOMATED COUNT: 13.7 % (ref 11.5–14.5)
EST. GFR  (NO RACE VARIABLE): >60 ML/MIN/1.73 M^2
ESTIMATED AVG GLUCOSE: 103 MG/DL (ref 68–131)
FOLATE SERPL-MCNC: 7.2 NG/ML (ref 4–24)
GLUCOSE SERPL-MCNC: 87 MG/DL (ref 70–110)
HBA1C MFR BLD: 5.2 % (ref 4–5.6)
HCT VFR BLD AUTO: 38.3 % (ref 37–48.5)
HDLC SERPL-MCNC: 59 MG/DL (ref 40–75)
HDLC SERPL: 32.2 % (ref 20–50)
HGB BLD-MCNC: 11.9 G/DL (ref 12–16)
IMM GRANULOCYTES # BLD AUTO: 0.01 K/UL (ref 0–0.04)
IMM GRANULOCYTES NFR BLD AUTO: 0.3 % (ref 0–0.5)
LDLC SERPL CALC-MCNC: 112.8 MG/DL (ref 63–159)
LYMPHOCYTES # BLD AUTO: 1.9 K/UL (ref 1–4.8)
LYMPHOCYTES NFR BLD: 50.5 % (ref 18–48)
MAGNESIUM SERPL-MCNC: 2.1 MG/DL (ref 1.6–2.6)
MCH RBC QN AUTO: 28.6 PG (ref 27–31)
MCHC RBC AUTO-ENTMCNC: 31.1 G/DL (ref 32–36)
MCV RBC AUTO: 92 FL (ref 82–98)
MONOCYTES # BLD AUTO: 0.4 K/UL (ref 0.3–1)
MONOCYTES NFR BLD: 10.2 % (ref 4–15)
NEUTROPHILS # BLD AUTO: 1.4 K/UL (ref 1.8–7.7)
NEUTROPHILS NFR BLD: 35.4 % (ref 38–73)
NONHDLC SERPL-MCNC: 124 MG/DL
NRBC BLD-RTO: 0 /100 WBC
PLATELET # BLD AUTO: 403 K/UL (ref 150–450)
PMV BLD AUTO: 9.3 FL (ref 9.2–12.9)
POTASSIUM SERPL-SCNC: 4.6 MMOL/L (ref 3.5–5.1)
PROT SERPL-MCNC: 7.3 G/DL (ref 6–8.4)
RBC # BLD AUTO: 4.16 M/UL (ref 4–5.4)
SODIUM SERPL-SCNC: 138 MMOL/L (ref 136–145)
TRIGL SERPL-MCNC: 56 MG/DL (ref 30–150)
TSH SERPL DL<=0.005 MIU/L-ACNC: 0.67 UIU/ML (ref 0.4–4)
VIT B12 SERPL-MCNC: 376 PG/ML (ref 210–950)
WBC # BLD AUTO: 3.82 K/UL (ref 3.9–12.7)

## 2023-03-07 PROCEDURE — 82607 VITAMIN B-12: CPT | Performed by: SURGERY

## 2023-03-07 PROCEDURE — 85025 COMPLETE CBC W/AUTO DIFF WBC: CPT | Performed by: SURGERY

## 2023-03-07 PROCEDURE — 83036 HEMOGLOBIN GLYCOSYLATED A1C: CPT | Performed by: SURGERY

## 2023-03-07 PROCEDURE — 80053 COMPREHEN METABOLIC PANEL: CPT | Performed by: SURGERY

## 2023-03-07 PROCEDURE — 84425 ASSAY OF VITAMIN B-1: CPT | Performed by: SURGERY

## 2023-03-07 PROCEDURE — 82746 ASSAY OF FOLIC ACID SERUM: CPT | Performed by: SURGERY

## 2023-03-07 PROCEDURE — 80061 LIPID PANEL: CPT | Performed by: SURGERY

## 2023-03-07 PROCEDURE — 83735 ASSAY OF MAGNESIUM: CPT | Performed by: SURGERY

## 2023-03-07 PROCEDURE — 82652 VIT D 1 25-DIHYDROXY: CPT | Performed by: SURGERY

## 2023-03-07 PROCEDURE — 84443 ASSAY THYROID STIM HORMONE: CPT | Performed by: SURGERY

## 2023-03-07 NOTE — TELEPHONE ENCOUNTER
Returned pt call in regard to being scheduled with a rd to proceed with Bariatric surgery at another surgical program. Pt stated she is being worked up for surgery elsewhere but needs dietician clearance. Informed pt she will have to see a rd at the program she is seeking care with for her upcoming Bariatric surgery . Pt verbalized understanding.

## 2023-03-07 NOTE — TELEPHONE ENCOUNTER
----- Message from Nieves Cisse sent at 3/7/2023 12:16 PM CST -----  Regarding: Appt  Contact: Pt [phone]  Patient requesting call back in regards to scheduling an appt with a dietician to get clearance for next steps in bariatric Please call to discuss further

## 2023-03-08 ENCOUNTER — PATIENT MESSAGE (OUTPATIENT)
Dept: BARIATRICS | Facility: CLINIC | Age: 38
End: 2023-03-08
Payer: COMMERCIAL

## 2023-03-10 LAB — 1,25(OH)2D3 SERPL-MCNC: 62 PG/ML (ref 20–79)

## 2023-03-13 LAB — VIT B1 BLD-MCNC: 53 UG/L (ref 38–122)

## 2023-03-14 ENCOUNTER — OFFICE VISIT (OUTPATIENT)
Dept: OPTOMETRY | Facility: CLINIC | Age: 38
End: 2023-03-14
Payer: COMMERCIAL

## 2023-03-14 ENCOUNTER — PATIENT MESSAGE (OUTPATIENT)
Dept: BARIATRICS | Facility: CLINIC | Age: 38
End: 2023-03-14
Payer: COMMERCIAL

## 2023-03-14 DIAGNOSIS — H52.203 MYOPIA OF BOTH EYES WITH ASTIGMATISM: Primary | ICD-10-CM

## 2023-03-14 DIAGNOSIS — Z97.3 WEARS CONTACT LENSES: Primary | ICD-10-CM

## 2023-03-14 DIAGNOSIS — H52.13 MYOPIA OF BOTH EYES WITH ASTIGMATISM: Primary | ICD-10-CM

## 2023-03-14 PROCEDURE — 99999 PR PBB SHADOW E&M-EST. PATIENT-LVL II: CPT | Mod: PBBFAC,,, | Performed by: OPTOMETRIST

## 2023-03-14 PROCEDURE — 99499 UNLISTED E&M SERVICE: CPT | Mod: S$GLB,,, | Performed by: OPTOMETRIST

## 2023-03-14 PROCEDURE — 92014 PR EYE EXAM, EST PATIENT,COMPREHESV: ICD-10-PCS | Mod: S$GLB,,, | Performed by: OPTOMETRIST

## 2023-03-14 PROCEDURE — 92014 COMPRE OPH EXAM EST PT 1/>: CPT | Mod: S$GLB,,, | Performed by: OPTOMETRIST

## 2023-03-14 PROCEDURE — 92015 DETERMINE REFRACTIVE STATE: CPT | Mod: S$GLB,,, | Performed by: OPTOMETRIST

## 2023-03-14 PROCEDURE — 99999 PR PBB SHADOW E&M-EST. PATIENT-LVL III: CPT | Mod: PBBFAC,,, | Performed by: OPTOMETRIST

## 2023-03-14 PROCEDURE — 99999 PR PBB SHADOW E&M-EST. PATIENT-LVL II: ICD-10-PCS | Mod: PBBFAC,,, | Performed by: OPTOMETRIST

## 2023-03-14 PROCEDURE — 92310 CONTACT LENS FITTING OU: CPT | Mod: S$GLB,,, | Performed by: OPTOMETRIST

## 2023-03-14 PROCEDURE — 92310 PR CONTACT LENS FITTING (NO CHANGE): ICD-10-PCS | Mod: S$GLB,,, | Performed by: OPTOMETRIST

## 2023-03-14 PROCEDURE — 99999 PR PBB SHADOW E&M-EST. PATIENT-LVL III: ICD-10-PCS | Mod: PBBFAC,,, | Performed by: OPTOMETRIST

## 2023-03-14 PROCEDURE — 99499 NO LOS: ICD-10-PCS | Mod: S$GLB,,, | Performed by: OPTOMETRIST

## 2023-03-14 PROCEDURE — 92015 PR REFRACTION: ICD-10-PCS | Mod: S$GLB,,, | Performed by: OPTOMETRIST

## 2023-03-14 NOTE — PROGRESS NOTES
HPI    36 Y/o female is here for routine eye exam with C/o pt is still having the   issue fitting the contact on OS  Pt denies pain and discomfort   No f/f    Eye med: Contact lens re-wetting gtt OU PRN   Last edited by Lala Cramer MA on 3/14/2023 10:15 AM.            Assessment /Plan     For exam results, see Encounter Report.    Myopia of both eyes with astigmatism      Spectacle Rx given and Contact lens Rx released to pt. Daily wear only advised, with education to risks of extended wear.  Discussed lens care, compliance and solutions. RTC yearly contact lens follow up.  , discussed different options for glasses. RTC 1 year routine eye exam.

## 2023-03-14 NOTE — PROGRESS NOTES
Assessment /Plan     For exam results, see Encounter Report.    Wears contact lenses      Contact lens Rx released to pt. Daily wear only advised, with education to risks of extended wear.  Discussed lens care, compliance and solutions. RTC yearly contact lens follow up.

## 2023-03-16 ENCOUNTER — PATIENT MESSAGE (OUTPATIENT)
Dept: OPTOMETRY | Facility: CLINIC | Age: 38
End: 2023-03-16
Payer: COMMERCIAL

## 2023-03-29 ENCOUNTER — PATIENT MESSAGE (OUTPATIENT)
Dept: SPORTS MEDICINE | Facility: CLINIC | Age: 38
End: 2023-03-29

## 2023-03-29 ENCOUNTER — OFFICE VISIT (OUTPATIENT)
Dept: SPORTS MEDICINE | Facility: CLINIC | Age: 38
End: 2023-03-29
Payer: COMMERCIAL

## 2023-03-29 ENCOUNTER — HOSPITAL ENCOUNTER (OUTPATIENT)
Dept: RADIOLOGY | Facility: HOSPITAL | Age: 38
Discharge: HOME | End: 2023-03-29
Attending: PHYSICIAN ASSISTANT
Payer: COMMERCIAL

## 2023-03-29 VITALS
WEIGHT: 248 LBS | SYSTOLIC BLOOD PRESSURE: 124 MMHG | BODY MASS INDEX: 42.57 KG/M2 | HEART RATE: 87 BPM | DIASTOLIC BLOOD PRESSURE: 84 MMHG

## 2023-03-29 DIAGNOSIS — M25.562 CHRONIC PAIN OF LEFT KNEE: ICD-10-CM

## 2023-03-29 DIAGNOSIS — G89.29 CHRONIC PAIN OF BOTH KNEES: Primary | ICD-10-CM

## 2023-03-29 DIAGNOSIS — M25.561 PAIN IN BOTH KNEES, UNSPECIFIED CHRONICITY: ICD-10-CM

## 2023-03-29 DIAGNOSIS — M25.562 CHRONIC PAIN OF BOTH KNEES: Primary | ICD-10-CM

## 2023-03-29 DIAGNOSIS — M25.561 CHRONIC PAIN OF BOTH KNEES: Primary | ICD-10-CM

## 2023-03-29 DIAGNOSIS — M25.562 PAIN IN BOTH KNEES, UNSPECIFIED CHRONICITY: ICD-10-CM

## 2023-03-29 DIAGNOSIS — G89.29 CHRONIC PAIN OF LEFT KNEE: ICD-10-CM

## 2023-03-29 DIAGNOSIS — M25.561 CHRONIC PAIN OF RIGHT KNEE: ICD-10-CM

## 2023-03-29 DIAGNOSIS — G89.29 CHRONIC PAIN OF RIGHT KNEE: ICD-10-CM

## 2023-03-29 PROCEDURE — 1160F PR REVIEW ALL MEDS BY PRESCRIBER/CLIN PHARMACIST DOCUMENTED: ICD-10-PCS | Mod: CPTII,S$GLB,, | Performed by: PHYSICIAN ASSISTANT

## 2023-03-29 PROCEDURE — 1159F MED LIST DOCD IN RCRD: CPT | Mod: CPTII,S$GLB,, | Performed by: PHYSICIAN ASSISTANT

## 2023-03-29 PROCEDURE — 3044F HG A1C LEVEL LT 7.0%: CPT | Mod: CPTII,S$GLB,, | Performed by: PHYSICIAN ASSISTANT

## 2023-03-29 PROCEDURE — 99999 PR PBB SHADOW E&M-EST. PATIENT-LVL IV: ICD-10-PCS | Mod: PBBFAC,,, | Performed by: PHYSICIAN ASSISTANT

## 2023-03-29 PROCEDURE — 3079F DIAST BP 80-89 MM HG: CPT | Mod: CPTII,S$GLB,, | Performed by: PHYSICIAN ASSISTANT

## 2023-03-29 PROCEDURE — 3079F PR MOST RECENT DIASTOLIC BLOOD PRESSURE 80-89 MM HG: ICD-10-PCS | Mod: CPTII,S$GLB,, | Performed by: PHYSICIAN ASSISTANT

## 2023-03-29 PROCEDURE — 3008F BODY MASS INDEX DOCD: CPT | Mod: CPTII,S$GLB,, | Performed by: PHYSICIAN ASSISTANT

## 2023-03-29 PROCEDURE — 20610 PR DRAIN/INJECT LARGE JOINT/BURSA: ICD-10-PCS | Mod: 50,S$GLB,, | Performed by: PHYSICIAN ASSISTANT

## 2023-03-29 PROCEDURE — 73564 XR KNEE ORTHO BILAT WITH FLEXION: ICD-10-PCS | Mod: 26,,, | Performed by: RADIOLOGY

## 2023-03-29 PROCEDURE — 1160F RVW MEDS BY RX/DR IN RCRD: CPT | Mod: CPTII,S$GLB,, | Performed by: PHYSICIAN ASSISTANT

## 2023-03-29 PROCEDURE — 99999 PR PBB SHADOW E&M-EST. PATIENT-LVL IV: CPT | Mod: PBBFAC,,, | Performed by: PHYSICIAN ASSISTANT

## 2023-03-29 PROCEDURE — 20610 DRAIN/INJ JOINT/BURSA W/O US: CPT | Mod: 50,S$GLB,, | Performed by: PHYSICIAN ASSISTANT

## 2023-03-29 PROCEDURE — 73564 X-RAY EXAM KNEE 4 OR MORE: CPT | Mod: TC,50

## 2023-03-29 PROCEDURE — 3044F PR MOST RECENT HEMOGLOBIN A1C LEVEL <7.0%: ICD-10-PCS | Mod: CPTII,S$GLB,, | Performed by: PHYSICIAN ASSISTANT

## 2023-03-29 PROCEDURE — 1159F PR MEDICATION LIST DOCUMENTED IN MEDICAL RECORD: ICD-10-PCS | Mod: CPTII,S$GLB,, | Performed by: PHYSICIAN ASSISTANT

## 2023-03-29 PROCEDURE — 3074F PR MOST RECENT SYSTOLIC BLOOD PRESSURE < 130 MM HG: ICD-10-PCS | Mod: CPTII,S$GLB,, | Performed by: PHYSICIAN ASSISTANT

## 2023-03-29 PROCEDURE — 99214 OFFICE O/P EST MOD 30 MIN: CPT | Mod: 25,S$GLB,, | Performed by: PHYSICIAN ASSISTANT

## 2023-03-29 PROCEDURE — 99214 PR OFFICE/OUTPT VISIT, EST, LEVL IV, 30-39 MIN: ICD-10-PCS | Mod: 25,S$GLB,, | Performed by: PHYSICIAN ASSISTANT

## 2023-03-29 PROCEDURE — 73564 X-RAY EXAM KNEE 4 OR MORE: CPT | Mod: 26,,, | Performed by: RADIOLOGY

## 2023-03-29 PROCEDURE — 3008F PR BODY MASS INDEX (BMI) DOCUMENTED: ICD-10-PCS | Mod: CPTII,S$GLB,, | Performed by: PHYSICIAN ASSISTANT

## 2023-03-29 PROCEDURE — 3074F SYST BP LT 130 MM HG: CPT | Mod: CPTII,S$GLB,, | Performed by: PHYSICIAN ASSISTANT

## 2023-03-29 RX ORDER — TRIAMCINOLONE ACETONIDE 40 MG/ML
40 INJECTION, SUSPENSION INTRA-ARTICULAR; INTRAMUSCULAR
Status: COMPLETED | OUTPATIENT
Start: 2023-03-29 | End: 2023-03-29

## 2023-03-29 RX ORDER — BUPIVACAINE HYDROCHLORIDE 2.5 MG/ML
3 INJECTION, SOLUTION INFILTRATION; PERINEURAL
Status: COMPLETED | OUTPATIENT
Start: 2023-03-29 | End: 2023-03-29

## 2023-03-29 RX ORDER — DICLOFENAC SODIUM 10 MG/G
2 GEL TOPICAL 3 TIMES DAILY
Qty: 1 EACH | Refills: 2 | Status: SHIPPED | OUTPATIENT
Start: 2023-03-29 | End: 2024-03-28

## 2023-03-29 RX ADMIN — TRIAMCINOLONE ACETONIDE 40 MG: 40 INJECTION, SUSPENSION INTRA-ARTICULAR; INTRAMUSCULAR at 12:03

## 2023-03-29 RX ADMIN — BUPIVACAINE HYDROCHLORIDE 7.5 MG: 2.5 INJECTION, SOLUTION INFILTRATION; PERINEURAL at 12:03

## 2023-03-29 NOTE — PROGRESS NOTES
Subjective:          Chief Complaint: Mela Blanco is a 37 y.o. female who had concerns including Pain of the Left Knee and Pain of the Right Knee.    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 She reports right knee pain is worse than left. Pain is diffuse and worse with activity, specifically walking, pain is improved with rest. She uses a voltaren gel and ibuprofen intermittently. She feels a significant improvement in pain with HEP and CSI injections and has been able to decrease the amount she walks for her job as well. Most recent aspiration and CSI bilateral knees by Virgil Harrington on 12/30/2021. She has been completing a HEP 3x a week. She has decided to continue to wait on surgery as she feels HEP and injections are managing the pain. No other concerns at this time. She wears a brace on her right knee and patella tendon strap on her left knee.         Review of Systems   Constitutional: Negative. Negative for chills, fever, weight gain and weight loss.   HENT:  Negative for congestion and sore throat.    Eyes:  Negative for blurred vision and double vision.   Cardiovascular:  Negative for chest pain, leg swelling and palpitations.   Respiratory:  Negative for cough and shortness of breath.    Hematologic/Lymphatic: Does not bruise/bleed easily.   Skin:  Negative for itching, poor wound healing and rash.   Musculoskeletal:  Positive for joint pain. Negative for back pain, joint swelling, muscle weakness, myalgias and stiffness.   Gastrointestinal:  Negative for abdominal pain, constipation, diarrhea, nausea and vomiting.   Genitourinary: Negative.  Negative for frequency and hematuria.   Neurological:  Negative for dizziness, headaches, numbness, paresthesias and sensory change.   Psychiatric/Behavioral:  Negative for altered mental status and depression. The patient is not nervous/anxious.    Allergic/Immunologic: Negative for  hives.                 Objective:        General: Mela is well-developed, well-nourished, appears stated age, in no acute distress, alert and oriented to time, place and person.     General    Nursing note and vitals reviewed.  Constitutional: She is oriented to person, place, and time. She appears well-developed and well-nourished. No distress.   HENT:   Head: Normocephalic and atraumatic.   Nose: Nose normal.   Eyes: EOM are normal.   Cardiovascular:  Normal rate and intact distal pulses.            Pulmonary/Chest: Effort normal. No respiratory distress.   Neurological: She is alert and oriented to person, place, and time.   Psychiatric: She has a normal mood and affect. Her behavior is normal. Judgment and thought content normal.     General Musculoskeletal Exam   Gait: normal       Right Knee Exam     Inspection   Erythema: absent  Scars: absent  Swelling: present  Effusion: absent  Deformity: absent  Bruising: absent    Tenderness   The patient is tender to palpation of the patella.    Crepitus   The patient has crepitus of the patella.    Range of Motion   Extension:  -5   Right knee flexion: 125.     Tests   Meniscus   Tracy:  Medial - negative Lateral - positive  Ligament Examination   Lachman: normal (-1 to 2mm)   PCL-Posterior Drawer: normal (0 to 2mm)     MCL - Valgus: normal (0 to 2mm)  LCL - Varus: normal  Patella   Patellar apprehension: negative  Passive Patellar Tilt: lateral tilt  Patellar Glide (quadrants): Lateral - 1   Medial - 2  Patellar Grind: positive    Other   Sensation: normal    Left Knee Exam     Inspection   Erythema: absent  Scars: absent  Swelling: absent  Effusion: absent  Deformity: absent  Bruising: absent    Tenderness   The patient tender to palpation of the patella.    Crepitus   The patient has crepitus of the patella.    Range of Motion   Extension:  -5 normal   Flexion:  normal Left knee flexion: 125.    Tests   Meniscus   Tracy:  Medial - negative Lateral -  negative  Stability   Lachman: normal (-1 to 2mm)   PCL-Posterior Drawer: normal (0 to 2mm)  MCL - Valgus: normal (0 to 2mm)  LCL - Varus: normal (0 to 2mm)  Posterolateral Corner: stable  Patella   Patellar apprehension: negative  Passive Patellar Tilt: lateral tilt  Patellar Glide (Quadrants): Lateral - 2 and 1 Medial - 2  Patellar Grind: positive    Other   Sensation: normal    Muscle Strength   Right Lower Extremity   Quadriceps:  5/5   Hamstrin/5   Left Lower Extremity   Quadriceps:  5/5   Hamstrin/5     Vascular Exam     Right Pulses  Dorsalis Pedis:      2+  Posterior Tibial:      2+        Left Pulses  Dorsalis Pedis:      2+  Posterior Tibial:      2+        Edema  Right Lower Leg: absent  Left Lower Leg: absent    RADIOGRAPHS:  Bilateral knee: 3/29/23    CT right knee 2021  FINDINGS:  There is no evidence of fracture or dislocation.  There is minimal lateral tibiofemoral joint space narrowing with marginal osteophyte formation as well as minimal lateral patellofemoral joint space narrowing.  Small volume joint effusion.  Approximately 6 mm lateral subluxation of the patella.  Tiny So's cyst.  Subchondral cystic degeneration at the tibial spines.  Prominence of the ACL correlating with finding of mucoid degeneration on prior MRI.  Bony mineralization is normal.     TT-TG distance measures 12 mm (series 2, image 526).     Impression:     CT right knee:     TT-TG distance within normal limits.     Mild lateral patellar subluxation.  Minimal degenerative changes.     Small joint effusion.        Assessment:       Encounter Diagnoses   Name Primary?    Chronic pain of both knees Yes    Chronic pain of right knee     Chronic pain of left knee           Plan:         1. We discussed at length different treatment options including conservative vs surgical management. These include anti-inflammatories, acetaminophen, rest, ice, heat, formal physical therapy including strengthening and stretching  exercises, home exercise programs, dry needling, CSI, and finally surgical intervention.      2. Patient decided to continue with conservative management. Continue patellofemoral and quad strengthening program.     3. I made the decision to obtain old records of the patient including previous notes and imaging. New imaging was ordered today of the extremity or extremities evaluated. I independently reviewed and interpreted the radiographs and/or MRIs today as well as prior imaging. X-rays reviewed in detail with patient.     4. NSAIDS prn pain     5. She will need FMLA paperwork filled out again which she will drop off to the  for us to complete. She may eventually need hour and standing work restrictions    6. Injection Procedure bilateral knees  A time out was performed, including verification of patient ID, procedure, site and side, availability of information and equipment, review of safety issues, and agreement with consent, the procedure site was marked.    After time out was performed, the patient was prepped aseptically with povidone-iodine swabsticks. A diagnostic and therapeutic injection of 1:3cc Kenalog/Marcaine was given under sterile technique using a 22g x 1.5 needle from the Superolateral  aspect of the bilateral Knee Joint in the supine position.      Mela Blanco had no adverse reactions to the medication. Pain decreased. She was instructed to apply ice to the joint for 20 minutes and avoid strenuous activities for 24-36 hours following the injection. She was warned of possible blood sugar and/or blood pressure changes during that time. Following that time, she can resume regular activities.    She was reminded to call the clinic immediately for any adverse side effects as explained in clinic today.    7. RTC prn        Patient questionnaires may have been collected.

## 2023-04-04 ENCOUNTER — PATIENT MESSAGE (OUTPATIENT)
Dept: SPORTS MEDICINE | Facility: CLINIC | Age: 38
End: 2023-04-04
Payer: COMMERCIAL

## 2023-04-05 ENCOUNTER — PATIENT MESSAGE (OUTPATIENT)
Dept: BARIATRICS | Facility: CLINIC | Age: 38
End: 2023-04-05
Payer: COMMERCIAL

## 2023-04-06 ENCOUNTER — PATIENT MESSAGE (OUTPATIENT)
Dept: SPORTS MEDICINE | Facility: CLINIC | Age: 38
End: 2023-04-06
Payer: COMMERCIAL

## 2023-04-09 ENCOUNTER — PATIENT MESSAGE (OUTPATIENT)
Dept: INTERNAL MEDICINE | Facility: CLINIC | Age: 38
End: 2023-04-09
Payer: COMMERCIAL

## 2023-04-09 ENCOUNTER — PATIENT MESSAGE (OUTPATIENT)
Dept: BARIATRICS | Facility: CLINIC | Age: 38
End: 2023-04-09
Payer: COMMERCIAL

## 2023-04-09 DIAGNOSIS — E66.09 CLASS 2 OBESITY DUE TO EXCESS CALORIES WITH BODY MASS INDEX (BMI) OF 35.0 TO 35.9 IN ADULT, UNSPECIFIED WHETHER SERIOUS COMORBIDITY PRESENT: ICD-10-CM

## 2023-04-09 RX ORDER — OMEPRAZOLE 40 MG/1
40 CAPSULE, DELAYED RELEASE ORAL EVERY MORNING
Qty: 90 CAPSULE | Refills: 0 | Status: SHIPPED | OUTPATIENT
Start: 2023-04-09 | End: 2023-07-17

## 2023-04-10 ENCOUNTER — DOCUMENTATION ONLY (OUTPATIENT)
Dept: BARIATRICS | Facility: CLINIC | Age: 38
End: 2023-04-10
Payer: COMMERCIAL

## 2023-04-11 ENCOUNTER — PATIENT MESSAGE (OUTPATIENT)
Dept: RESEARCH | Facility: HOSPITAL | Age: 38
End: 2023-04-11

## 2023-04-11 ENCOUNTER — PATIENT MESSAGE (OUTPATIENT)
Dept: BARIATRICS | Facility: CLINIC | Age: 38
End: 2023-04-11
Payer: COMMERCIAL

## 2023-05-03 ENCOUNTER — PATIENT MESSAGE (OUTPATIENT)
Dept: BARIATRICS | Facility: CLINIC | Age: 38
End: 2023-05-03
Payer: COMMERCIAL

## 2023-05-04 ENCOUNTER — DOCUMENTATION ONLY (OUTPATIENT)
Dept: BARIATRICS | Facility: CLINIC | Age: 38
End: 2023-05-04
Payer: COMMERCIAL

## 2023-07-16 DIAGNOSIS — E66.09 CLASS 2 OBESITY DUE TO EXCESS CALORIES WITH BODY MASS INDEX (BMI) OF 35.0 TO 35.9 IN ADULT, UNSPECIFIED WHETHER SERIOUS COMORBIDITY PRESENT: ICD-10-CM

## 2023-07-17 RX ORDER — OMEPRAZOLE 40 MG/1
CAPSULE, DELAYED RELEASE ORAL
Qty: 90 CAPSULE | Refills: 0 | Status: SHIPPED | OUTPATIENT
Start: 2023-07-17

## 2023-10-09 ENCOUNTER — OFFICE VISIT (OUTPATIENT)
Dept: SPORTS MEDICINE | Facility: CLINIC | Age: 38
End: 2023-10-09
Payer: COMMERCIAL

## 2023-10-09 VITALS
DIASTOLIC BLOOD PRESSURE: 84 MMHG | WEIGHT: 201.06 LBS | BODY MASS INDEX: 34.33 KG/M2 | HEIGHT: 64 IN | SYSTOLIC BLOOD PRESSURE: 137 MMHG | HEART RATE: 105 BPM

## 2023-10-09 DIAGNOSIS — M17.11 PRIMARY OSTEOARTHRITIS OF RIGHT KNEE: ICD-10-CM

## 2023-10-09 DIAGNOSIS — M17.12 PRIMARY OSTEOARTHRITIS OF LEFT KNEE: Primary | ICD-10-CM

## 2023-10-09 DIAGNOSIS — M25.561 CHRONIC PAIN OF BOTH KNEES: ICD-10-CM

## 2023-10-09 DIAGNOSIS — M25.562 CHRONIC PAIN OF BOTH KNEES: ICD-10-CM

## 2023-10-09 DIAGNOSIS — G89.29 CHRONIC PAIN OF BOTH KNEES: ICD-10-CM

## 2023-10-09 PROCEDURE — 1160F PR REVIEW ALL MEDS BY PRESCRIBER/CLIN PHARMACIST DOCUMENTED: ICD-10-PCS | Mod: CPTII,S$GLB,, | Performed by: PHYSICIAN ASSISTANT

## 2023-10-09 PROCEDURE — 3008F BODY MASS INDEX DOCD: CPT | Mod: CPTII,S$GLB,, | Performed by: PHYSICIAN ASSISTANT

## 2023-10-09 PROCEDURE — 3079F PR MOST RECENT DIASTOLIC BLOOD PRESSURE 80-89 MM HG: ICD-10-PCS | Mod: CPTII,S$GLB,, | Performed by: PHYSICIAN ASSISTANT

## 2023-10-09 PROCEDURE — 3044F HG A1C LEVEL LT 7.0%: CPT | Mod: CPTII,S$GLB,, | Performed by: PHYSICIAN ASSISTANT

## 2023-10-09 PROCEDURE — 3079F DIAST BP 80-89 MM HG: CPT | Mod: CPTII,S$GLB,, | Performed by: PHYSICIAN ASSISTANT

## 2023-10-09 PROCEDURE — 1159F PR MEDICATION LIST DOCUMENTED IN MEDICAL RECORD: ICD-10-PCS | Mod: CPTII,S$GLB,, | Performed by: PHYSICIAN ASSISTANT

## 2023-10-09 PROCEDURE — 1160F RVW MEDS BY RX/DR IN RCRD: CPT | Mod: CPTII,S$GLB,, | Performed by: PHYSICIAN ASSISTANT

## 2023-10-09 PROCEDURE — 99999 PR PBB SHADOW E&M-EST. PATIENT-LVL IV: ICD-10-PCS | Mod: PBBFAC,,, | Performed by: PHYSICIAN ASSISTANT

## 2023-10-09 PROCEDURE — 99214 OFFICE O/P EST MOD 30 MIN: CPT | Mod: PBBFAC | Performed by: PHYSICIAN ASSISTANT

## 2023-10-09 PROCEDURE — 99215 PR OFFICE/OUTPT VISIT, EST, LEVL V, 40-54 MIN: ICD-10-PCS | Mod: S$GLB,,, | Performed by: PHYSICIAN ASSISTANT

## 2023-10-09 PROCEDURE — 3075F PR MOST RECENT SYSTOLIC BLOOD PRESS GE 130-139MM HG: ICD-10-PCS | Mod: CPTII,S$GLB,, | Performed by: PHYSICIAN ASSISTANT

## 2023-10-09 PROCEDURE — 3075F SYST BP GE 130 - 139MM HG: CPT | Mod: CPTII,S$GLB,, | Performed by: PHYSICIAN ASSISTANT

## 2023-10-09 PROCEDURE — 3044F PR MOST RECENT HEMOGLOBIN A1C LEVEL <7.0%: ICD-10-PCS | Mod: CPTII,S$GLB,, | Performed by: PHYSICIAN ASSISTANT

## 2023-10-09 PROCEDURE — 1159F MED LIST DOCD IN RCRD: CPT | Mod: CPTII,S$GLB,, | Performed by: PHYSICIAN ASSISTANT

## 2023-10-09 PROCEDURE — 99999 PR PBB SHADOW E&M-EST. PATIENT-LVL IV: CPT | Mod: PBBFAC,,, | Performed by: PHYSICIAN ASSISTANT

## 2023-10-09 PROCEDURE — 99215 OFFICE O/P EST HI 40 MIN: CPT | Mod: S$GLB,,, | Performed by: PHYSICIAN ASSISTANT

## 2023-10-09 PROCEDURE — 3008F PR BODY MASS INDEX (BMI) DOCUMENTED: ICD-10-PCS | Mod: CPTII,S$GLB,, | Performed by: PHYSICIAN ASSISTANT

## 2023-10-09 NOTE — PROGRESS NOTES
Subjective:          Chief Complaint: Mela Blanco is a 37 y.o. female who had concerns including Injections of the Left Knee and Injections of the Right Knee.    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 She reports right knee pain is worse than left. Pain is diffuse and worse with activity, specifically prolonged walking and standing, and pain is improved with rest. She uses an Aleve spray and diclofenac intermittently. She feels a significant improvement in pain with HEP and CSI injections. She reports recently being short staffed at work and having to work 10-12 hour days/5 days a week which have increased her pain significantly. Most recent aspiration and CSI bilateral knees by myself on 3/29/23. Pain in right knee is 5/10. Pain in left knee is 3/10. She is interested in discussing visco supplementation again. She has decided to continue to wait on surgery as she feels HEP and injections are managing the pain. No other concerns at this time. She wears a brace on her right knee and patella tendon strap on her left knee.           Review of Systems   Constitutional: Negative. Negative for chills, fever, weight gain and weight loss.   HENT:  Negative for congestion and sore throat.    Eyes:  Negative for blurred vision and double vision.   Cardiovascular:  Negative for chest pain, leg swelling and palpitations.   Respiratory:  Negative for cough and shortness of breath.    Hematologic/Lymphatic: Does not bruise/bleed easily.   Skin:  Negative for itching, poor wound healing and rash.   Musculoskeletal:  Positive for joint pain. Negative for back pain, joint swelling, muscle weakness, myalgias and stiffness.   Gastrointestinal:  Negative for abdominal pain, constipation, diarrhea, nausea and vomiting.   Genitourinary: Negative.  Negative for frequency and hematuria.   Neurological:  Negative for  dizziness, headaches, numbness, paresthesias and sensory change.   Psychiatric/Behavioral:  Negative for altered mental status and depression. The patient is not nervous/anxious.    Allergic/Immunologic: Negative for hives.       Pain Related Questions  Over the past 3 days, what was your average pain during activity? (I.e. running, jogging, walking, climbing stairs, getting dressed, ect.): 6  Over the past 3 days, what was your highest pain level?: 7  Over the past 3 days, what was your lowest pain level? : 2    Other  How many nights a week are you awakened by your affected body part?: 0  Was the patient's HEIGHT measured or patient reported?: Patient Reported  Was the patient's WEIGHT measured or patient reported?: Measured      Objective:        General: Mela is well-developed, well-nourished, appears stated age, in no acute distress, alert and oriented to time, place and person.     General    Nursing note and vitals reviewed.  Constitutional: She is oriented to person, place, and time. She appears well-developed and well-nourished. No distress.   HENT:   Head: Normocephalic and atraumatic.   Nose: Nose normal.   Eyes: EOM are normal.   Cardiovascular:  Normal rate and intact distal pulses.            Pulmonary/Chest: Effort normal. No respiratory distress.   Neurological: She is alert and oriented to person, place, and time.   Psychiatric: She has a normal mood and affect. Her behavior is normal. Judgment and thought content normal.     General Musculoskeletal Exam   Gait: abnormal and antalgic       Right Knee Exam     Inspection   Erythema: absent  Scars: absent  Swelling: present  Effusion: absent  Deformity: absent  Bruising: absent    Tenderness   The patient is tender to palpation of the patella.    Crepitus   The patient has crepitus of the patella.    Range of Motion   Extension:  -5   Right knee flexion: 125.     Tests   Meniscus   Tracy:  Medial - negative   Ligament Examination   Lachman:  normal (-1 to 2mm)   PCL-Posterior Drawer: normal (0 to 2mm)     MCL - Valgus: normal (0 to 2mm)  LCL - Varus: normal  Patella   Patellar apprehension: negative  Passive Patellar Tilt: lateral tilt  Patellar Glide (quadrants): Lateral - 1   Medial - 2  Patellar Grind: positive    Other   Sensation: normal    Left Knee Exam     Inspection   Erythema: absent  Scars: absent  Swelling: absent  Effusion: absent  Deformity: absent  Bruising: absent    Tenderness   The patient tender to palpation of the patella and medial joint line.    Crepitus   The patient has crepitus of the patella.    Range of Motion   Extension:  -5 normal   Flexion:  130 normal     Tests   Meniscus   Tracy:  Medial - positive Lateral - negative  Stability   Lachman: normal (-1 to 2mm)   PCL-Posterior Drawer: normal (0 to 2mm)  MCL - Valgus: normal (0 to 2mm)  LCL - Varus: normal (0 to 2mm)  Posterolateral Corner: stable  Patella   Patellar apprehension: negative  Passive Patellar Tilt: lateral tilt  Patellar Glide (Quadrants): Lateral - 2 and 1 Medial - 2  Patellar Grind: positive    Other   Sensation: normal    Muscle Strength   Right Lower Extremity   Quadriceps:  5/5   Hamstrin/5   Left Lower Extremity   Quadriceps:  5/5   Hamstrin/5     Vascular Exam     Right Pulses  Dorsalis Pedis:      2+  Posterior Tibial:      2+        Left Pulses  Dorsalis Pedis:      2+  Posterior Tibial:      2+        Edema  Right Lower Leg: absent  Left Lower Leg: absent      RADIOGRAPHS:  Bilateral knee: 3/29/23  FINDINGS:  Minimal mild tricompartment DJD changes particularly medial left patellofemoral joint with linea 3 mm size joint foreign body, cartilage calcification, limited vacuum joint lateral compartment left knee joint, additional DJD changes medial compartments and right anterior tibial plateau, remote tiny subcutaneous dystrophic calcifications pretibial.  No definite joint effusions.    CT right knee 2021  FINDINGS:  There is no  evidence of fracture or dislocation.  There is minimal lateral tibiofemoral joint space narrowing with marginal osteophyte formation as well as minimal lateral patellofemoral joint space narrowing.  Small volume joint effusion.  Approximately 6 mm lateral subluxation of the patella.  Tiny So's cyst.  Subchondral cystic degeneration at the tibial spines.  Prominence of the ACL correlating with finding of mucoid degeneration on prior MRI.  Bony mineralization is normal.     TT-TG distance measures 12 mm (series 2, image 526).     Impression:     CT right knee:     TT-TG distance within normal limits.     Mild lateral patellar subluxation.  Minimal degenerative changes.     Small joint effusion.        Assessment:       Encounter Diagnoses   Name Primary?    Primary osteoarthritis of left knee Yes    Primary osteoarthritis of right knee     Chronic pain of both knees           Plan:       We have discussed a variety of treatment options including medications, injections, physical therapy and other alternative treatments. I also explained the indications, risks and benefits of surgery.  Patient's pain is refractory HEP, conservative management, and NSAIDs. Pt would like to proceed with visco-supplementation.    Medical Necessity for viscosupplementation use: After thorough evaluation of the patient, I have determined that viscosupplementation treatment is medically necessary. The patient has painful degenerative joint disease (DJD) of the knee(s) with failure of conservative treatments including lifestyle modifications and rehabilitation exercises. Oral analgesics including NSAIDs have not adequately controlled the patient's symptoms. There is radiographic evidence of Kellgren-Terrance grade II (or greater) osteoarthritic (OA) changes, or if lack of radiographic evidence, there is arthroscopic or other evidence of chondrosis of the knee(s).     I made the decision to obtain old records of the patient including  previous notes and imaging. I independently reviewed and interpreted lab results today as well as prior imaging.  Reviewed radiographs with patient in detail.    1. Pre-authorization placed for bilateral knee Gel-one injection.  2. Ice compress to the affected area 2-3x a day for 15-20 minutes as needed for pain management.  3. Continue Aleve spray and oral diclofenac as needed for pain.  4. Continue patellofemoral strength and conditioning protocol HEP  5. No standing or walking for longer than 8 hours a day. Patient may be bringing in [facility] paperwork to be filled out with these restrictions.  6. MRI left knee to evaluate cartilage and rule out medial meniscus tear.   7. RTC to see Leandra Patrick PA-C for bilateral Gel-One injections.  All of the patient's questions were answered and the patient will contact us if they have any questions or concerns in the interim.      Patient questionnaires may have been collected.

## 2023-10-17 ENCOUNTER — OFFICE VISIT (OUTPATIENT)
Dept: PRIMARY CARE CLINIC | Facility: CLINIC | Age: 38
End: 2023-10-17
Payer: COMMERCIAL

## 2023-10-17 ENCOUNTER — HOSPITAL ENCOUNTER (OUTPATIENT)
Dept: RADIOLOGY | Facility: HOSPITAL | Age: 38
Discharge: HOME | End: 2023-10-17
Attending: PHYSICIAN ASSISTANT
Payer: COMMERCIAL

## 2023-10-17 VITALS
OXYGEN SATURATION: 100 % | DIASTOLIC BLOOD PRESSURE: 78 MMHG | WEIGHT: 203.94 LBS | SYSTOLIC BLOOD PRESSURE: 124 MMHG | HEART RATE: 85 BPM | BODY MASS INDEX: 34.82 KG/M2 | HEIGHT: 64 IN

## 2023-10-17 DIAGNOSIS — R53.83 FATIGUE, UNSPECIFIED TYPE: ICD-10-CM

## 2023-10-17 DIAGNOSIS — M17.12 PRIMARY OSTEOARTHRITIS OF LEFT KNEE: ICD-10-CM

## 2023-10-17 DIAGNOSIS — Z98.84 BARIATRIC SURGERY STATUS: Primary | ICD-10-CM

## 2023-10-17 DIAGNOSIS — E55.9 VITAMIN D INSUFFICIENCY: ICD-10-CM

## 2023-10-17 PROCEDURE — 99213 PR OFFICE/OUTPT VISIT, EST, LEVL III, 20-29 MIN: ICD-10-PCS | Mod: S$GLB,,, | Performed by: STUDENT IN AN ORGANIZED HEALTH CARE EDUCATION/TRAINING PROGRAM

## 2023-10-17 PROCEDURE — 3008F BODY MASS INDEX DOCD: CPT | Mod: CPTII,S$GLB,, | Performed by: STUDENT IN AN ORGANIZED HEALTH CARE EDUCATION/TRAINING PROGRAM

## 2023-10-17 PROCEDURE — 3078F PR MOST RECENT DIASTOLIC BLOOD PRESSURE < 80 MM HG: ICD-10-PCS | Mod: CPTII,S$GLB,, | Performed by: STUDENT IN AN ORGANIZED HEALTH CARE EDUCATION/TRAINING PROGRAM

## 2023-10-17 PROCEDURE — 73721 MRI JNT OF LWR EXTRE W/O DYE: CPT | Mod: 26,LT,, | Performed by: RADIOLOGY

## 2023-10-17 PROCEDURE — 1160F RVW MEDS BY RX/DR IN RCRD: CPT | Mod: CPTII,S$GLB,, | Performed by: STUDENT IN AN ORGANIZED HEALTH CARE EDUCATION/TRAINING PROGRAM

## 2023-10-17 PROCEDURE — 99999 PR PBB SHADOW E&M-EST. PATIENT-LVL IV: CPT | Mod: PBBFAC,,, | Performed by: STUDENT IN AN ORGANIZED HEALTH CARE EDUCATION/TRAINING PROGRAM

## 2023-10-17 PROCEDURE — 3044F PR MOST RECENT HEMOGLOBIN A1C LEVEL <7.0%: ICD-10-PCS | Mod: CPTII,S$GLB,, | Performed by: STUDENT IN AN ORGANIZED HEALTH CARE EDUCATION/TRAINING PROGRAM

## 2023-10-17 PROCEDURE — 1159F PR MEDICATION LIST DOCUMENTED IN MEDICAL RECORD: ICD-10-PCS | Mod: CPTII,S$GLB,, | Performed by: STUDENT IN AN ORGANIZED HEALTH CARE EDUCATION/TRAINING PROGRAM

## 2023-10-17 PROCEDURE — 99999 PR PBB SHADOW E&M-EST. PATIENT-LVL IV: ICD-10-PCS | Mod: PBBFAC,,, | Performed by: STUDENT IN AN ORGANIZED HEALTH CARE EDUCATION/TRAINING PROGRAM

## 2023-10-17 PROCEDURE — 3078F DIAST BP <80 MM HG: CPT | Mod: CPTII,S$GLB,, | Performed by: STUDENT IN AN ORGANIZED HEALTH CARE EDUCATION/TRAINING PROGRAM

## 2023-10-17 PROCEDURE — 3008F PR BODY MASS INDEX (BMI) DOCUMENTED: ICD-10-PCS | Mod: CPTII,S$GLB,, | Performed by: STUDENT IN AN ORGANIZED HEALTH CARE EDUCATION/TRAINING PROGRAM

## 2023-10-17 PROCEDURE — 99213 OFFICE O/P EST LOW 20 MIN: CPT | Mod: S$GLB,,, | Performed by: STUDENT IN AN ORGANIZED HEALTH CARE EDUCATION/TRAINING PROGRAM

## 2023-10-17 PROCEDURE — 3074F SYST BP LT 130 MM HG: CPT | Mod: CPTII,S$GLB,, | Performed by: STUDENT IN AN ORGANIZED HEALTH CARE EDUCATION/TRAINING PROGRAM

## 2023-10-17 PROCEDURE — 73721 MRI KNEE WITHOUT CONTRAST LEFT: ICD-10-PCS | Mod: 26,LT,, | Performed by: RADIOLOGY

## 2023-10-17 PROCEDURE — 3044F HG A1C LEVEL LT 7.0%: CPT | Mod: CPTII,S$GLB,, | Performed by: STUDENT IN AN ORGANIZED HEALTH CARE EDUCATION/TRAINING PROGRAM

## 2023-10-17 PROCEDURE — 1160F PR REVIEW ALL MEDS BY PRESCRIBER/CLIN PHARMACIST DOCUMENTED: ICD-10-PCS | Mod: CPTII,S$GLB,, | Performed by: STUDENT IN AN ORGANIZED HEALTH CARE EDUCATION/TRAINING PROGRAM

## 2023-10-17 PROCEDURE — 73721 MRI JNT OF LWR EXTRE W/O DYE: CPT | Mod: TC,LT

## 2023-10-17 PROCEDURE — 1159F MED LIST DOCD IN RCRD: CPT | Mod: CPTII,S$GLB,, | Performed by: STUDENT IN AN ORGANIZED HEALTH CARE EDUCATION/TRAINING PROGRAM

## 2023-10-17 PROCEDURE — 3074F PR MOST RECENT SYSTOLIC BLOOD PRESSURE < 130 MM HG: ICD-10-PCS | Mod: CPTII,S$GLB,, | Performed by: STUDENT IN AN ORGANIZED HEALTH CARE EDUCATION/TRAINING PROGRAM

## 2023-10-17 NOTE — PROGRESS NOTES
Subjective:       Patient ID: Mela Blanco is a 37 y.o. female.   Chief Complaint: Headache and Dizziness      Experiencing sxs of fatigue, headaches and intermittent dizziness for the last few weeks. Hx of bariatric sx. Due for follow up lab work. Has been low in vitamin D in the past.    Review of Systems   Constitutional:  Negative for fatigue and fever.   Respiratory:  Negative for cough and shortness of breath.    Cardiovascular:  Negative for chest pain.   Gastrointestinal:  Negative for abdominal pain, diarrhea, nausea and vomiting.   Musculoskeletal:  Negative for back pain.   Neurological:  Positive for dizziness and headaches. Negative for weakness.              Objective:        Physical Exam  HENT:      Head: Normocephalic and atraumatic.      Nose: Nose normal.      Mouth/Throat:      Mouth: Mucous membranes are moist.      Pharynx: Oropharynx is clear.   Eyes:      Extraocular Movements: Extraocular movements intact.      Conjunctiva/sclera: Conjunctivae normal.      Pupils: Pupils are equal, round, and reactive to light.   Cardiovascular:      Rate and Rhythm: Normal rate and regular rhythm.   Pulmonary:      Effort: Pulmonary effort is normal.   Musculoskeletal:         General: No swelling. Normal range of motion.      Cervical back: Normal range of motion.      Right lower leg: No edema.      Left lower leg: No edema.   Skin:     General: Skin is warm.      Findings: No lesion or rash.   Neurological:      General: No focal deficit present.      Mental Status: She is alert and oriented to person, place, and time.      Motor: No weakness.   Psychiatric:         Mood and Affect: Mood normal.         Thought Content: Thought content normal.         Assessment:         Problem List Items Addressed This Visit          Endocrine    Bariatric surgery status - Primary    Relevant Orders    VITAMIN B12 (Completed)    Vitamin D (Completed)    VITAMIN B1    COMPREHENSIVE METABOLIC PANEL  (Completed)    CBC W/ AUTO DIFFERENTIAL (Completed)    Ferritin (Completed)    Iron and TIBC (Completed)    TSH (Completed)    Magnesium (Completed)    ZINC    FOLATE (Completed)    Vitamin D insufficiency    Relevant Orders    Vitamin D (Completed)     Other Visit Diagnoses       Fatigue, unspecified type        Relevant Orders    VITAMIN B12 (Completed)    Vitamin D (Completed)    VITAMIN B1    COMPREHENSIVE METABOLIC PANEL (Completed)    CBC W/ AUTO DIFFERENTIAL (Completed)    Ferritin (Completed)    Iron and TIBC (Completed)    TSH (Completed)    Magnesium (Completed)    ZINC    FOLATE (Completed)              Plan:         1. Bariatric surgery status  Overview:  5/2023    Orders:  -     VITAMIN B12; Future; Expected date: 10/17/2023  -     Vitamin D; Future; Expected date: 10/17/2023  -     VITAMIN B1; Future; Expected date: 10/17/2023  -     COMPREHENSIVE METABOLIC PANEL; Future; Expected date: 10/17/2023  -     CBC W/ AUTO DIFFERENTIAL; Future; Expected date: 10/17/2023  -     Ferritin; Future; Expected date: 10/17/2023  -     Iron and TIBC; Future; Expected date: 10/17/2023  -     TSH; Future; Expected date: 10/17/2023  -     Magnesium; Future; Expected date: 10/17/2023  -     ZINC; Future; Expected date: 10/17/2023  -     FOLATE; Future; Expected date: 10/17/2023    2. Vitamin D insufficiency  -     Vitamin D; Future; Expected date: 10/17/2023    3. Fatigue, unspecified type  -     VITAMIN B12; Future; Expected date: 10/17/2023  -     Vitamin D; Future; Expected date: 10/17/2023  -     VITAMIN B1; Future; Expected date: 10/17/2023  -     COMPREHENSIVE METABOLIC PANEL; Future; Expected date: 10/17/2023  -     CBC W/ AUTO DIFFERENTIAL; Future; Expected date: 10/17/2023  -     Ferritin; Future; Expected date: 10/17/2023  -     Iron and TIBC; Future; Expected date: 10/17/2023  -     TSH; Future; Expected date: 10/17/2023  -     Magnesium; Future; Expected date: 10/17/2023  -     ZINC; Future; Expected date:  10/17/2023  -     FOLATE; Future; Expected date: 10/17/2023          Beatris Campbell MD

## 2023-10-27 ENCOUNTER — OFFICE VISIT (OUTPATIENT)
Dept: SPORTS MEDICINE | Facility: CLINIC | Age: 38
End: 2023-10-27
Payer: COMMERCIAL

## 2023-10-27 ENCOUNTER — PATIENT MESSAGE (OUTPATIENT)
Dept: SPORTS MEDICINE | Facility: CLINIC | Age: 38
End: 2023-10-27

## 2023-10-27 VITALS — HEIGHT: 64 IN | BODY MASS INDEX: 34.66 KG/M2 | WEIGHT: 203 LBS

## 2023-10-27 DIAGNOSIS — S83.242A OTHER TEAR OF MEDIAL MENISCUS OF LEFT KNEE AS CURRENT INJURY, INITIAL ENCOUNTER: ICD-10-CM

## 2023-10-27 DIAGNOSIS — M17.12 PRIMARY OSTEOARTHRITIS OF LEFT KNEE: ICD-10-CM

## 2023-10-27 DIAGNOSIS — M17.11 PRIMARY OSTEOARTHRITIS OF RIGHT KNEE: ICD-10-CM

## 2023-10-27 DIAGNOSIS — M94.262 CHONDROMALACIA OF LEFT KNEE: Primary | ICD-10-CM

## 2023-10-27 DIAGNOSIS — M22.42 PATELLA, CHONDROMALACIA, LEFT: ICD-10-CM

## 2023-10-27 PROCEDURE — 3044F PR MOST RECENT HEMOGLOBIN A1C LEVEL <7.0%: ICD-10-PCS | Mod: CPTII,S$GLB,, | Performed by: PHYSICIAN ASSISTANT

## 2023-10-27 PROCEDURE — 3044F HG A1C LEVEL LT 7.0%: CPT | Mod: CPTII,S$GLB,, | Performed by: PHYSICIAN ASSISTANT

## 2023-10-27 PROCEDURE — 3008F BODY MASS INDEX DOCD: CPT | Mod: CPTII,S$GLB,, | Performed by: PHYSICIAN ASSISTANT

## 2023-10-27 PROCEDURE — 1159F MED LIST DOCD IN RCRD: CPT | Mod: CPTII,S$GLB,, | Performed by: PHYSICIAN ASSISTANT

## 2023-10-27 PROCEDURE — 99999 PR PBB SHADOW E&M-EST. PATIENT-LVL III: CPT | Mod: PBBFAC,,, | Performed by: PHYSICIAN ASSISTANT

## 2023-10-27 PROCEDURE — 99214 OFFICE O/P EST MOD 30 MIN: CPT | Mod: 25,S$GLB,, | Performed by: PHYSICIAN ASSISTANT

## 2023-10-27 PROCEDURE — 3008F PR BODY MASS INDEX (BMI) DOCUMENTED: ICD-10-PCS | Mod: CPTII,S$GLB,, | Performed by: PHYSICIAN ASSISTANT

## 2023-10-27 PROCEDURE — 99999 PR PBB SHADOW E&M-EST. PATIENT-LVL III: ICD-10-PCS | Mod: PBBFAC,,, | Performed by: PHYSICIAN ASSISTANT

## 2023-10-27 PROCEDURE — 1160F PR REVIEW ALL MEDS BY PRESCRIBER/CLIN PHARMACIST DOCUMENTED: ICD-10-PCS | Mod: CPTII,S$GLB,, | Performed by: PHYSICIAN ASSISTANT

## 2023-10-27 PROCEDURE — 20610 PR DRAIN/INJECT LARGE JOINT/BURSA: ICD-10-PCS | Mod: 50,S$GLB,, | Performed by: PHYSICIAN ASSISTANT

## 2023-10-27 PROCEDURE — 20610 DRAIN/INJ JOINT/BURSA W/O US: CPT | Mod: 50,S$GLB,, | Performed by: PHYSICIAN ASSISTANT

## 2023-10-27 PROCEDURE — 99214 PR OFFICE/OUTPT VISIT, EST, LEVL IV, 30-39 MIN: ICD-10-PCS | Mod: 25,S$GLB,, | Performed by: PHYSICIAN ASSISTANT

## 2023-10-27 PROCEDURE — 1160F RVW MEDS BY RX/DR IN RCRD: CPT | Mod: CPTII,S$GLB,, | Performed by: PHYSICIAN ASSISTANT

## 2023-10-27 PROCEDURE — 1159F PR MEDICATION LIST DOCUMENTED IN MEDICAL RECORD: ICD-10-PCS | Mod: CPTII,S$GLB,, | Performed by: PHYSICIAN ASSISTANT

## 2023-10-27 NOTE — PROGRESS NOTES
Subjective:          Chief Complaint: Mela Blanco is a 37 y.o. female who had concerns including Pain of the Left Knee and Pain of the Right Knee.    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    She reports right knee pain is worse than left. Pain is diffuse and worse with activity, specifically prolonged walking and standing, and pain is improved with rest. She uses an Aleve spray and diclofenac intermittently. She feels a significant improvement in pain with HEP and CSI injections. She reports recently being short staffed at work and having to work 10-12 hour days/5 days a week which have increased her pain significantly. Most recent aspiration and CSI bilateral knees by myself on 3/29/23. Pain in right knee is 5/10. Pain in left knee is 3/10. She is interested in discussing visco supplementation again. She has decided to continue to wait on surgery as she feels HEP and injections are managing the pain. No other concerns at this time. She wears a brace on her right knee and patella tendon strap on her left knee.           Review of Systems   Constitutional: Negative. Negative for chills, fever, weight gain and weight loss.   HENT:  Negative for congestion and sore throat.    Eyes:  Negative for blurred vision and double vision.   Cardiovascular:  Negative for chest pain, leg swelling and palpitations.   Respiratory:  Negative for cough and shortness of breath.    Hematologic/Lymphatic: Does not bruise/bleed easily.   Skin:  Negative for itching, poor wound healing and rash.   Musculoskeletal:  Positive for joint pain. Negative for back pain, joint swelling, muscle weakness, myalgias and stiffness.   Gastrointestinal:  Negative for abdominal pain, constipation, diarrhea, nausea and vomiting.   Genitourinary: Negative.  Negative for frequency and hematuria.   Neurological:   Negative for dizziness, headaches, numbness, paresthesias and sensory change.   Psychiatric/Behavioral:  Negative for altered mental status and depression. The patient is not nervous/anxious.    Allergic/Immunologic: Negative for hives.       Pain Related Questions  Over the past 3 days, what was your average pain during activity? (I.e. running, jogging, walking, climbing stairs, getting dressed, ect.): 3  Over the past 3 days, what was your highest pain level?: 4  Over the past 3 days, what was your lowest pain level? : 0    Other  How many nights a week are you awakened by your affected body part?: 2  Was the patient's HEIGHT measured or patient reported?: Patient Reported  Was the patient's WEIGHT measured or patient reported?: Measured      Objective:        General: Mela is well-developed, well-nourished, appears stated age, in no acute distress, alert and oriented to time, place and person.     General    Nursing note and vitals reviewed.  Constitutional: She is oriented to person, place, and time. She appears well-developed and well-nourished. No distress.   HENT:   Head: Normocephalic and atraumatic.   Nose: Nose normal.   Eyes: EOM are normal.   Cardiovascular:  Normal rate and intact distal pulses.            Pulmonary/Chest: Effort normal. No respiratory distress.   Neurological: She is alert and oriented to person, place, and time.   Psychiatric: She has a normal mood and affect. Her behavior is normal. Judgment and thought content normal.     General Musculoskeletal Exam   Gait: abnormal and antalgic       Right Knee Exam     Inspection   Erythema: absent  Scars: absent  Swelling: present  Effusion: absent  Deformity: absent  Bruising: absent    Tenderness   The patient is tender to palpation of the patella.    Crepitus   The patient has crepitus of the patella.    Range of Motion   Extension:  -5   Right knee flexion: 125.     Tests   Meniscus   Tracy:  Medial - negative   Ligament Examination    Lachman: normal (-1 to 2mm)   PCL-Posterior Drawer: normal (0 to 2mm)     MCL - Valgus: normal (0 to 2mm)  LCL - Varus: normal  Patella   Patellar apprehension: negative  Passive Patellar Tilt: lateral tilt  Patellar Glide (quadrants): Lateral - 1   Medial - 2  Patellar Grind: positive    Other   Sensation: normal    Left Knee Exam     Inspection   Erythema: absent  Scars: absent  Swelling: absent  Effusion: absent  Deformity: absent  Bruising: absent    Tenderness   The patient tender to palpation of the patella and medial joint line.    Crepitus   The patient has crepitus of the patella.    Range of Motion   Extension:  -5 normal   Flexion:  130 normal     Tests   Meniscus   Tracy:  Medial - positive Lateral - negative  Stability   Lachman: normal (-1 to 2mm)   PCL-Posterior Drawer: normal (0 to 2mm)  MCL - Valgus: normal (0 to 2mm)  LCL - Varus: normal (0 to 2mm)  Posterolateral Corner: stable  Patella   Patellar apprehension: negative  Passive Patellar Tilt: lateral tilt  Patellar Glide (Quadrants): Lateral - 2 and 1 Medial - 2  Patellar Grind: positive    Other   Sensation: normal    Muscle Strength   Right Lower Extremity   Quadriceps:  5/5   Hamstrin/5   Left Lower Extremity   Quadriceps:  5/5   Hamstrin/5     Vascular Exam     Right Pulses  Dorsalis Pedis:      2+  Posterior Tibial:      2+        Left Pulses  Dorsalis Pedis:      2+  Posterior Tibial:      2+        Edema  Right Lower Leg: absent  Left Lower Leg: absent      RADIOGRAPHS:  Bilateral knee: 3/29/23  FINDINGS:  Minimal mild tricompartment DJD changes particularly medial left patellofemoral joint with linea 3 mm size joint foreign body, cartilage calcification, limited vacuum joint lateral compartment left knee joint, additional DJD changes medial compartments and right anterior tibial plateau, remote tiny subcutaneous dystrophic calcifications pretibial.  No definite joint effusions.    CT right knee 2021  FINDINGS:  There  is no evidence of fracture or dislocation.  There is minimal lateral tibiofemoral joint space narrowing with marginal osteophyte formation as well as minimal lateral patellofemoral joint space narrowing.  Small volume joint effusion.  Approximately 6 mm lateral subluxation of the patella.  Tiny So's cyst.  Subchondral cystic degeneration at the tibial spines.  Prominence of the ACL correlating with finding of mucoid degeneration on prior MRI.  Bony mineralization is normal.     TT-TG distance measures 12 mm (series 2, image 526).     Impression:     CT right knee:     TT-TG distance within normal limits.     Mild lateral patellar subluxation.  Minimal degenerative changes.     Small joint effusion.    Left knee MRI: 10/17/23  FINDINGS:  Menisci: Slight irregular morphology at the periphery of the junction of the body segment and posterior horn of the medial meniscus with a linear area of intermediate signal that closely approximates the inferior articular surface (coronal series 8001, image 16).  Lateral meniscus demonstrates preserved morphology and signal intensity.  Intact anterior and posterior root ligaments.     Ligaments: ACL mucoid degeneration.  Attenuated fibular collateral ligament, possibly an anatomic variant.  Mild edema signal about the superficial MCL/posteromedial capsule.  PCL and remaining posterolateral corner structures are intact.     Extensor Mechanism: Patellofemoral alignment is maintained.  Quadriceps and patellar tendons are intact.  MPFL and medial/lateral retinacula are normal.     Cartilage:     *Patellofemoral: Partial-thickness chondral fissuring at the central and medial trochlea with a small focal area subchondral edema.  Patellar cartilage is grossly preserved.  *Medial tibiofemoral: Superficial chondral fissuring at the central weight-bearing femoral condyle.  No full-thickness defects or subchondral edema.  *Lateral tibiofemoral: Intact without partial or full-thickness  defects.  No subchondral edema.  Bones: Subcortical marrow edema at the anterior aspect of the tibial spines, likely reactive.  No fractures.  No avascular necrosis.  No marrow infiltrative process.     Miscellaneous: Small volume of intra-articular fluid.  Small popliteal cyst.  Medial gastrocnemius, lateral gastrocnemius, distal semimembranosus and visualized pes anserine tendons are intact.  Distal iliotibial band is normal.  Visualized neurovascular structures demonstrate no significant abnormalities.      Assessment:       Encounter Diagnoses   Name Primary?    Chondromalacia of left knee Yes    Patella, chondromalacia, left     Other tear of medial meniscus of left knee as current injury, initial encounter     Primary osteoarthritis of right knee     Primary osteoarthritis of left knee             Plan:       We have discussed a variety of treatment options including medications, injections, physical therapy and other alternative treatments. I also explained the indications, risks and benefits of surgery.  Patient's pain is refractory HEP, conservative management, and NSAIDs. Pt would like to proceed with visco-supplementation today.    Medical Necessity for viscosupplementation use: After thorough evaluation of the patient, I have determined that viscosupplementation treatment is medically necessary. The patient has painful degenerative joint disease (DJD) of the knee(s) with failure of conservative treatments including lifestyle modifications and rehabilitation exercises. Oral analgesics including NSAIDs have not adequately controlled the patient's symptoms. There is radiographic evidence of Kellgren-Terrance grade II (or greater) osteoarthritic (OA) changes, or if lack of radiographic evidence, there is arthroscopic or other evidence of chondrosis of the knee(s).     I made the decision to obtain old records of the patient including previous notes and imaging. I independently reviewed and interpreted lab  results today as well as prior imaging.  Reviewed MRI with patient in detail.     Recommended left knee arthroscopic medial meniscectomy vs repair. She would like to try visco supplementation first and move forward with arthroscopy in 2024 if no relief in left knee.    1. Injection Procedure bilateral knees  A time out was performed, including verification of patient ID, procedure, site and side, availability of information and equipment, review of safety issues, and agreement with consent, the procedure site was marked.    After time out was performed, the patient was prepped aseptically with povidone-iodine swabsticks. A diagnostic and therapeutic injection of 3cc Gel ONE was given under sterile technique using a 22g x 1.5 needle from the Superolateral  aspect of the bilateral Knee Joint in the supine position.      Mela Blanco had no adverse reactions to the medication. Pain decreased. She was instructed to apply ice to the joint for 20 minutes and avoid strenuous activities for 24-36 hours following the injection. She was warned of possible blood sugar and/or blood pressure changes during that time. Following that time, she can resume regular activities.    She was reminded to call the clinic immediately for any adverse side effects as explained in clinic today.    2. Ice compress to the affected area 2-3x a day for 15-20 minutes as needed for pain management.  3. Continue Aleve spray and oral diclofenac as needed for pain.  4. Continue patellofemoral strength and conditioning protocol HEP  5. No standing or walking for longer than 8 hours a day. Note provided.  6. RTC to see Leandra Patrick PA-C in 3 months for follow up.    All of the patient's questions were answered and the patient will contact us if they have any questions or concerns in the interim.      Patient questionnaires may have been collected.

## 2023-10-27 NOTE — LETTER
Patient: Mela Blanco   YOB: 1985   Clinic Number: [id number]   Today's Date: October 27, 2023        Certificate to Return to Work     Mela Blanco was seen by Leandra Patrick PA-C on 10/27/2023.    Mela Blanco can return to work on 10/30/23.     Please excuse Mela from work on 10/27/23-10/29/23    Specific restrictions:     If you have any questions or concerns, please feel free to contact the office at [phone].    Thank you.    Leandra Patrick PA-C        Signature:

## 2023-10-29 ENCOUNTER — PATIENT MESSAGE (OUTPATIENT)
Dept: OPTOMETRY | Facility: CLINIC | Age: 38
End: 2023-10-29
Payer: COMMERCIAL

## 2023-10-30 ENCOUNTER — TELEPHONE (OUTPATIENT)
Dept: SPORTS MEDICINE | Facility: CLINIC | Age: 38
End: 2023-10-30
Payer: COMMERCIAL

## 2023-10-30 NOTE — TELEPHONE ENCOUNTER
Called the patient to inform her that the requested note has been sent to her [facility] portal. Patient can call the office at [phone] if she has any questions or concerns.

## 2023-10-30 NOTE — TELEPHONE ENCOUNTER
----- Message from Nilam Taylor NP sent at 10/27/2023  4:12 PM CDT -----  Regarding: FW: doctors note  Contact: pt [phone]    ----- Message -----  From: Aiyana Alves  Sent: 10/27/2023   4:02 PM CDT  To: [facility] PACK Staff  Subject: doctors note                                     PATIENTCALL     Pt is calling to speak with someone in provider office regarding she states that she requested a doctor note to go to work on Monday so, she was trying to see if the office was going to send it today she  is asking for a return call please call pt at  [phone]

## 2023-11-30 ENCOUNTER — PATIENT MESSAGE (OUTPATIENT)
Dept: SPORTS MEDICINE | Facility: CLINIC | Age: 38
End: 2023-11-30
Payer: COMMERCIAL

## 2023-12-06 ENCOUNTER — PATIENT MESSAGE (OUTPATIENT)
Dept: SPORTS MEDICINE | Facility: CLINIC | Age: 38
End: 2023-12-06
Payer: COMMERCIAL

## 2023-12-28 ENCOUNTER — TELEPHONE (OUTPATIENT)
Dept: SPORTS MEDICINE | Facility: CLINIC | Age: 38
End: 2023-12-28
Payer: COMMERCIAL

## 2023-12-28 NOTE — TELEPHONE ENCOUNTER
Spoke to the patient in regards to her appointment on 1/19/24 with Leandra Patrick PA-C. While on the call I informed the patient that Leandra Patrick PA-C will not be in clinic on 1/19/24. I offered the patient an appointment on 1/22/24 at 12:00pm at the [place] location with Leandra Patrick PA-C. The patient accepted the appointment.

## 2024-02-02 DIAGNOSIS — J30.89 PERENNIAL ALLERGIC RHINITIS: ICD-10-CM

## 2024-02-02 RX ORDER — CETIRIZINE HYDROCHLORIDE 10 MG/1
10 TABLET ORAL DAILY PRN
Qty: 90 TABLET | Refills: 3 | Status: SHIPPED | OUTPATIENT
Start: 2024-02-02

## 2024-02-19 ENCOUNTER — OFFICE VISIT (OUTPATIENT)
Dept: SPORTS MEDICINE | Facility: CLINIC | Age: 39
End: 2024-02-19
Payer: COMMERCIAL

## 2024-02-19 VITALS
WEIGHT: 192.25 LBS | DIASTOLIC BLOOD PRESSURE: 79 MMHG | SYSTOLIC BLOOD PRESSURE: 116 MMHG | HEIGHT: 64 IN | HEART RATE: 83 BPM | BODY MASS INDEX: 32.82 KG/M2

## 2024-02-19 DIAGNOSIS — M22.42 CHONDROMALACIA, PATELLA, LEFT: ICD-10-CM

## 2024-02-19 DIAGNOSIS — S83.242A OTHER TEAR OF MEDIAL MENISCUS OF LEFT KNEE AS CURRENT INJURY, INITIAL ENCOUNTER: ICD-10-CM

## 2024-02-19 DIAGNOSIS — M17.12 PRIMARY OSTEOARTHRITIS OF LEFT KNEE: Primary | ICD-10-CM

## 2024-02-19 DIAGNOSIS — M17.11 PRIMARY OSTEOARTHRITIS OF RIGHT KNEE: ICD-10-CM

## 2024-02-19 PROCEDURE — 3008F BODY MASS INDEX DOCD: CPT | Mod: CPTII,S$GLB,, | Performed by: PHYSICIAN ASSISTANT

## 2024-02-19 PROCEDURE — 3078F DIAST BP <80 MM HG: CPT | Mod: CPTII,S$GLB,, | Performed by: PHYSICIAN ASSISTANT

## 2024-02-19 PROCEDURE — 1159F MED LIST DOCD IN RCRD: CPT | Mod: CPTII,S$GLB,, | Performed by: PHYSICIAN ASSISTANT

## 2024-02-19 PROCEDURE — 99213 OFFICE O/P EST LOW 20 MIN: CPT | Mod: S$GLB,,, | Performed by: PHYSICIAN ASSISTANT

## 2024-02-19 PROCEDURE — 99999 PR PBB SHADOW E&M-EST. PATIENT-LVL III: CPT | Mod: PBBFAC,,, | Performed by: PHYSICIAN ASSISTANT

## 2024-02-19 PROCEDURE — 3074F SYST BP LT 130 MM HG: CPT | Mod: CPTII,S$GLB,, | Performed by: PHYSICIAN ASSISTANT

## 2024-02-19 NOTE — PROGRESS NOTES
Subjective:          Chief Complaint: Mela Blanco is a 38 y.o. female who had concerns including Follow-up of the Right Knee and Follow-up of the Left Knee.    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 She has recently lost 20lbs since October 2024 with dieting. She has stopped standing and walking longer than 8 hours while at work. She also reports significant relief of pain and improvement of function from bilateral knee Gel-One injections she received on 10/27/24. She would like to receive this injection again when approved. Pain in right knee today is 1/10 and left knee is 0/10.    She reports right knee pain is typically worse than left. Pain is diffuse and worse with activity, specifically prolonged walking and standing, and pain is improved with rest. She uses an Aleve spray and diclofenac intermittently. She has decided to continue to wait on surgery as she feels HEP and injections are managing the pain. No other concerns at this time. She wears a brace on her right knee and patella tendon strap on her left knee.      Follow-up  Pertinent negatives include no abdominal pain, chest pain, chills, congestion, coughing, fever, headaches, joint swelling, myalgias, nausea, numbness, rash, sore throat or vomiting.        Review of Systems   Constitutional: Negative. Negative for chills, fever, weight gain and weight loss.   HENT:  Negative for congestion and sore throat.    Eyes:  Negative for blurred vision and double vision.   Cardiovascular:  Negative for chest pain, leg swelling and palpitations.   Respiratory:  Negative for cough and shortness of breath.    Hematologic/Lymphatic: Does not bruise/bleed easily.   Skin:  Negative for itching, poor wound healing and rash.   Musculoskeletal:  Positive for joint pain. Negative for back pain, joint swelling, muscle weakness, myalgias and stiffness.   Gastrointestinal:   Negative for abdominal pain, constipation, diarrhea, nausea and vomiting.   Genitourinary: Negative.  Negative for frequency and hematuria.   Neurological:  Negative for dizziness, headaches, numbness, paresthesias and sensory change.   Psychiatric/Behavioral:  Negative for altered mental status and depression. The patient is not nervous/anxious.    Allergic/Immunologic: Negative for hives.       Pain Related Questions  Over the past 3 days, what was your highest pain level?: 1  Over the past 3 days, what was your lowest pain level? : 1    Other  How many nights a week are you awakened by your affected body part?: 0  Was the patient's HEIGHT measured or patient reported?: Patient Reported  Was the patient's WEIGHT measured or patient reported?: Measured      Objective:        General: Mela is well-developed, well-nourished, appears stated age, in no acute distress, alert and oriented to time, place and person.     General    Nursing note and vitals reviewed.  Constitutional: She is oriented to person, place, and time. She appears well-developed and well-nourished. No distress.   HENT:   Head: Normocephalic and atraumatic.   Nose: Nose normal.   Eyes: EOM are normal.   Cardiovascular:  Normal rate and intact distal pulses.            Pulmonary/Chest: Effort normal. No respiratory distress.   Neurological: She is alert and oriented to person, place, and time.   Psychiatric: She has a normal mood and affect. Her behavior is normal. Judgment and thought content normal.     General Musculoskeletal Exam   Gait: normal       Right Knee Exam     Inspection   Erythema: absent  Scars: absent  Swelling: present  Effusion: absent  Deformity: absent  Bruising: absent    Tenderness   The patient is experiencing no tenderness.     Crepitus   The patient has crepitus of the patella.    Range of Motion   Extension:  -5   Right knee flexion: 125.     Tests   Meniscus   Tracy:  Medial - negative   Ligament Examination    Lachman: normal (-1 to 2mm)   PCL-Posterior Drawer: normal (0 to 2mm)     MCL - Valgus: normal (0 to 2mm)  LCL - Varus: normal  Patella   Patellar apprehension: negative  Passive Patellar Tilt: lateral tilt  Patellar Glide (quadrants): Lateral - 1   Medial - 2  Patellar Grind: negative    Other   Sensation: normal    Left Knee Exam     Inspection   Erythema: absent  Scars: absent  Swelling: absent  Effusion: absent  Deformity: absent  Bruising: absent    Tenderness   The patient is experiencing no tenderness.     Crepitus   The patient has crepitus of the patella.    Range of Motion   Extension:  -5 normal   Flexion:  130 normal     Tests   Meniscus   Tracy:  Medial - negative Lateral - negative  Stability   Lachman: normal (-1 to 2mm)   PCL-Posterior Drawer: normal (0 to 2mm)  MCL - Valgus: normal (0 to 2mm)  LCL - Varus: normal (0 to 2mm)  Posterolateral Corner: stable  Patella   Patellar apprehension: negative  Passive Patellar Tilt: lateral tilt  Patellar Glide (Quadrants): Lateral - 2 and 1 Medial - 2  Patellar Grind: negative    Other   Sensation: normal    Muscle Strength   Right Lower Extremity   Quadriceps:  5/5   Hamstrin/5   Left Lower Extremity   Quadriceps:  5/5   Hamstrin/5     Vascular Exam     Right Pulses  Dorsalis Pedis:      2+  Posterior Tibial:      2+        Left Pulses  Dorsalis Pedis:      2+  Posterior Tibial:      2+        Edema  Right Lower Leg: absent  Left Lower Leg: absent      RADIOGRAPHS:  Bilateral knee: 3/29/23  FINDINGS:  Minimal mild tricompartment DJD changes particularly medial left patellofemoral joint with linea 3 mm size joint foreign body, cartilage calcification, limited vacuum joint lateral compartment left knee joint, additional DJD changes medial compartments and right anterior tibial plateau, remote tiny subcutaneous dystrophic calcifications pretibial.  No definite joint effusions.    CT right knee 2021  FINDINGS:  There is no evidence of fracture  or dislocation.  There is minimal lateral tibiofemoral joint space narrowing with marginal osteophyte formation as well as minimal lateral patellofemoral joint space narrowing.  Small volume joint effusion.  Approximately 6 mm lateral subluxation of the patella.  Tiny So's cyst.  Subchondral cystic degeneration at the tibial spines.  Prominence of the ACL correlating with finding of mucoid degeneration on prior MRI.  Bony mineralization is normal.     TT-TG distance measures 12 mm (series 2, image 526).     Impression:     CT right knee:     TT-TG distance within normal limits.     Mild lateral patellar subluxation.  Minimal degenerative changes.     Small joint effusion.    Right knee MRI: 1/7/20  FINDINGS:  Menisci:  There is no tear of the medial or lateral meniscus.     Ligaments:  Thickening of the ACL with signal hyperintensity in keeping with mucoid degeneration.  Note made of edema at the osseous attachments.  PCL, MCL, and LCL complex are intact.     Tendons:  Extensor mechanism is maintained.     Cartilage:     Patellofemoral: Articular cartilage is maintained.     Medial tibiofemoral: Articular cartilage is maintained.     Lateral tibiofemoral: Articular cartilage is maintained.     Bone: No fracture or marrow replacing process.     Miscellaneous: Small joint effusion.  Tiny Baker's cyst.  Edema of Hoffa's fat pad along the infrapatellar plica noted.       Left knee MRI: 10/17/23  FINDINGS:  Menisci: Slight irregular morphology at the periphery of the junction of the body segment and posterior horn of the medial meniscus with a linear area of intermediate signal that closely approximates the inferior articular surface (coronal series 8001, image 16).  Lateral meniscus demonstrates preserved morphology and signal intensity.  Intact anterior and posterior root ligaments.     Ligaments: ACL mucoid degeneration.  Attenuated fibular collateral ligament, possibly an anatomic variant.  Mild edema signal about  the superficial MCL/posteromedial capsule.  PCL and remaining posterolateral corner structures are intact.     Extensor Mechanism: Patellofemoral alignment is maintained.  Quadriceps and patellar tendons are intact.  MPFL and medial/lateral retinacula are normal.     Cartilage:     *Patellofemoral: Partial-thickness chondral fissuring at the central and medial trochlea with a small focal area subchondral edema.  Patellar cartilage is grossly preserved.  *Medial tibiofemoral: Superficial chondral fissuring at the central weight-bearing femoral condyle.  No full-thickness defects or subchondral edema.  *Lateral tibiofemoral: Intact without partial or full-thickness defects.  No subchondral edema.  Bones: Subcortical marrow edema at the anterior aspect of the tibial spines, likely reactive.  No fractures.  No avascular necrosis.  No marrow infiltrative process.     Miscellaneous: Small volume of intra-articular fluid.  Small popliteal cyst.  Medial gastrocnemius, lateral gastrocnemius, distal semimembranosus and visualized pes anserine tendons are intact.  Distal iliotibial band is normal.  Visualized neurovascular structures demonstrate no significant abnormalities.      Assessment:       Encounter Diagnoses   Name Primary?    Primary osteoarthritis of right knee     Primary osteoarthritis of left knee Yes    Other tear of medial meniscus of left knee as current injury, initial encounter     Chondromalacia, patella, left             Plan:       We have discussed a variety of treatment options including medications, injections, physical therapy and other alternative treatments. I also explained the indications, risks and benefits of surgery.  Patient's pain is refractory HEP, conservative management, and NSAIDs. Pt would like to proceed with visco-supplementation.    Medical Necessity for viscosupplementation use: After thorough evaluation of the patient, I have determined that viscosupplementation treatment is  medically necessary. The patient has painful degenerative joint disease (DJD) of the knee(s) with failure of conservative treatments including lifestyle modifications and rehabilitation exercises. Oral analgesics including NSAIDs have not adequately controlled the patient's symptoms. There is radiographic evidence of Kellgren-Terrance grade II (or greater) osteoarthritic (OA) changes, or if lack of radiographic evidence, there is arthroscopic or other evidence of chondrosis of the knee(s).     I made the decision to obtain old records of the patient including previous notes and imaging. I independently reviewed and interpreted lab results today as well as prior imaging.  Reviewed MRI with patient in detail.     Recommended left knee arthroscopic medial meniscectomy vs repair if pain returns. She would like to continue visco supplementation and weight loss. She has had a significant decrease in her pain with this.     Auth placed for bilateral knee GEL ONE injections  2. Ice compress to the affected area 2-3x a day for 15-20 minutes as needed for pain management.  3. Continue Aleve spray and oral diclofenac as needed for pain.  4. Continue patellofemoral strength and conditioning protocol HEP  5. No standing or walking for longer than 8 hours a day.   6. RTC to see Leandra Patrick PA-C in 3 months for bilateral injections.   7. Continue weight loss. DOING GREAT! Lost 20 lbs in 3 months.    All of the patient's questions were answered and the patient will contact us if they have any questions or concerns in the interim.      Patient questionnaires may have been collected.

## 2024-03-28 DIAGNOSIS — G89.29 CHRONIC PAIN OF BOTH KNEES: ICD-10-CM

## 2024-03-28 DIAGNOSIS — M25.561 CHRONIC PAIN OF BOTH KNEES: ICD-10-CM

## 2024-03-28 DIAGNOSIS — M25.562 CHRONIC PAIN OF BOTH KNEES: ICD-10-CM

## 2024-03-28 RX ORDER — DICLOFENAC SODIUM 10 MG/G
GEL TOPICAL
Qty: 100 G | Refills: 2 | Status: SHIPPED | OUTPATIENT
Start: 2024-03-28

## 2024-04-03 DIAGNOSIS — Z12.31 OTHER SCREENING MAMMOGRAM: ICD-10-CM

## 2024-04-04 DIAGNOSIS — E66.09 CLASS 2 OBESITY DUE TO EXCESS CALORIES WITH BODY MASS INDEX (BMI) OF 35.0 TO 35.9 IN ADULT, UNSPECIFIED WHETHER SERIOUS COMORBIDITY PRESENT: ICD-10-CM

## 2024-04-04 RX ORDER — OMEPRAZOLE 40 MG/1
CAPSULE, DELAYED RELEASE ORAL
Qty: 90 CAPSULE | Refills: 0 | Status: SHIPPED | OUTPATIENT
Start: 2024-04-04

## 2024-04-04 NOTE — TELEPHONE ENCOUNTER
No care due was identified.  [facility] Embedded Care Due Messages. Reference number: [id number].   4/04/2024 11:58:46 AM CDT

## 2024-04-10 ENCOUNTER — PATIENT MESSAGE (OUTPATIENT)
Dept: ADMINISTRATIVE | Facility: HOSPITAL | Age: 39
End: 2024-04-10
Payer: COMMERCIAL

## 2024-04-15 ENCOUNTER — PATIENT MESSAGE (OUTPATIENT)
Dept: SPORTS MEDICINE | Facility: CLINIC | Age: 39
End: 2024-04-15
Payer: COMMERCIAL

## 2024-04-15 ENCOUNTER — TELEPHONE (OUTPATIENT)
Dept: SPORTS MEDICINE | Facility: CLINIC | Age: 39
End: 2024-04-15
Payer: COMMERCIAL

## 2024-04-15 NOTE — TELEPHONE ENCOUNTER
Called the patient to discuss knee gel injections. The patient can call the office to further discuss. [phone].

## 2024-04-17 ENCOUNTER — HOSPITAL ENCOUNTER (OUTPATIENT)
Dept: RADIOLOGY | Facility: HOSPITAL | Age: 39
Discharge: HOME | End: 2024-04-17
Attending: STUDENT IN AN ORGANIZED HEALTH CARE EDUCATION/TRAINING PROGRAM
Payer: COMMERCIAL

## 2024-04-17 VITALS — BODY MASS INDEX: 32.78 KG/M2 | WEIGHT: 192 LBS | HEIGHT: 64 IN

## 2024-04-17 DIAGNOSIS — Z12.31 OTHER SCREENING MAMMOGRAM: ICD-10-CM

## 2024-04-17 PROCEDURE — 77067 SCR MAMMO BI INCL CAD: CPT | Mod: 26,,, | Performed by: RADIOLOGY

## 2024-04-17 PROCEDURE — 77067 SCR MAMMO BI INCL CAD: CPT | Mod: TC

## 2024-04-17 PROCEDURE — 77063 BREAST TOMOSYNTHESIS BI: CPT | Mod: 26,,, | Performed by: RADIOLOGY

## 2024-04-18 DIAGNOSIS — M17.11 PRIMARY OSTEOARTHRITIS OF RIGHT KNEE: Primary | ICD-10-CM

## 2024-04-18 DIAGNOSIS — M17.12 PRIMARY OSTEOARTHRITIS OF LEFT KNEE: ICD-10-CM

## 2024-04-19 DIAGNOSIS — M17.12 PRIMARY OSTEOARTHRITIS OF LEFT KNEE: ICD-10-CM

## 2024-04-19 DIAGNOSIS — M17.11 PRIMARY OSTEOARTHRITIS OF RIGHT KNEE: Primary | ICD-10-CM

## 2024-05-01 ENCOUNTER — OFFICE VISIT (OUTPATIENT)
Dept: SPORTS MEDICINE | Facility: CLINIC | Age: 39
End: 2024-05-01
Payer: COMMERCIAL

## 2024-05-01 VITALS — BODY MASS INDEX: 32.96 KG/M2 | HEIGHT: 64 IN

## 2024-05-01 DIAGNOSIS — M17.12 PRIMARY OSTEOARTHRITIS OF LEFT KNEE: ICD-10-CM

## 2024-05-01 DIAGNOSIS — M17.11 PRIMARY OSTEOARTHRITIS OF RIGHT KNEE: Primary | ICD-10-CM

## 2024-05-01 PROCEDURE — 20610 DRAIN/INJ JOINT/BURSA W/O US: CPT | Mod: 50,S$GLB,, | Performed by: PHYSICIAN ASSISTANT

## 2024-05-01 PROCEDURE — 99999 PR PBB SHADOW E&M-EST. PATIENT-LVL III: CPT | Mod: PBBFAC,,, | Performed by: PHYSICIAN ASSISTANT

## 2024-05-01 PROCEDURE — 99499 UNLISTED E&M SERVICE: CPT | Mod: S$GLB,,, | Performed by: PHYSICIAN ASSISTANT

## 2024-05-01 NOTE — PROGRESS NOTES
Patient is here for follow up of bilateral knee arthritis. Pt is requesting bilateral knee GelOne injections.  PMFH reviewed per encounter record. Has failed other conservative modalities including NSAIDS, activity modification, weight loss.    The prior shot was tolerated well.    PHYSICAL EXAMINATION:     General: The patient is alert and oriented x 3. Mood is pleasant.   Observation of ears, eyes and nose reveals no gross abnormalities. No   labored breathing observed.     No signs of infection or adverse reaction to knee.    PROCEDURE NOTE:  Injection Procedure bilateral knees  A time out was performed, including verification of patient ID, procedure, site and side, availability of information and equipment, review of safety issues, and agreement with consent, the procedure site was marked.    After time out was performed, the patient was prepped aseptically with povidone-iodine swabsticks. A diagnostic and therapeutic injection of 3cc GelOne was given under sterile technique using a 22g x 1.5 needle from the Superolateral  aspect of the bilateral Knee Joint in the supine position.      Mela Blanco had no adverse reactions to the medication. Pain decreased. She was instructed to apply ice to the joint for 20 minutes and avoid strenuous activities for 24-36 hours following the injection. She was warned of possible blood sugar and/or blood pressure changes during that time. Following that time, she can resume regular activities.    She was reminded to call the clinic immediately for any adverse side effects as explained in clinic today.    RTC in 3 months with Leandra Patrick PA-C for follow up bilateral knees.  All questions were answered, pt will contact us for questions or concerns in the interim.

## 2024-05-02 ENCOUNTER — PATIENT MESSAGE (OUTPATIENT)
Dept: SPORTS MEDICINE | Facility: CLINIC | Age: 39
End: 2024-05-02
Payer: COMMERCIAL

## 2024-05-09 ENCOUNTER — PATIENT MESSAGE (OUTPATIENT)
Dept: SPORTS MEDICINE | Facility: CLINIC | Age: 39
End: 2024-05-09
Payer: COMMERCIAL

## 2024-05-14 DIAGNOSIS — Z98.84 BARIATRIC SURGERY STATUS: ICD-10-CM

## 2024-05-14 RX ORDER — MUPIROCIN 20 MG/G
OINTMENT TOPICAL 3 TIMES DAILY
Qty: 30 G | Refills: 0 | Status: SHIPPED | OUTPATIENT
Start: 2024-05-14

## 2024-05-14 RX ORDER — ACETAMINOPHEN 500 MG
2000 TABLET ORAL DAILY
Qty: 90 CAPSULE | Refills: 1 | Status: SHIPPED | OUTPATIENT
Start: 2024-05-14

## 2024-05-14 NOTE — TELEPHONE ENCOUNTER
No care due was identified.  [facility] Embedded Care Due Messages. Reference number: [id number].   5/14/2024 3:15:29 PM CDT

## 2024-05-17 NOTE — TELEPHONE ENCOUNTER
Called patient at 12:50pm to check in on patient's knee pain. She stated that she has been unable to attend PT for her knee because she has been recovering from her carpal tunnel surgery. She is scheduled to have the contralateral carpal tunnel syndrome operated on 02/09/2018. PT number given to patient for her to learn exercises to be completing at home. Re-scheduled patient's follow up appt to March after she has completed some PT. Patient was happy with the call-back.   I personally performed the service described in the documentation recorded by the scribe in my presence, and it accurately and completely records my words and actions.

## 2024-06-26 DIAGNOSIS — J30.9 ALLERGIC RHINITIS, UNSPECIFIED SEASONALITY, UNSPECIFIED TRIGGER: ICD-10-CM

## 2024-06-26 DIAGNOSIS — E66.09 CLASS 2 OBESITY DUE TO EXCESS CALORIES WITH BODY MASS INDEX (BMI) OF 35.0 TO 35.9 IN ADULT, UNSPECIFIED WHETHER SERIOUS COMORBIDITY PRESENT: ICD-10-CM

## 2024-06-26 NOTE — TELEPHONE ENCOUNTER
No care due was identified.  [facility] Embedded Care Due Messages. Reference number: [id number].   6/26/2024 1:54:48 PM CDT

## 2024-06-27 RX ORDER — FLUTICASONE PROPIONATE 50 MCG
2 SPRAY, SUSPENSION (ML) NASAL 2 TIMES DAILY
Qty: 15.8 ML | Refills: 1 | Status: SHIPPED | OUTPATIENT
Start: 2024-06-27

## 2024-06-27 RX ORDER — OMEPRAZOLE 40 MG/1
CAPSULE, DELAYED RELEASE ORAL
Qty: 90 CAPSULE | Refills: 0 | Status: SHIPPED | OUTPATIENT
Start: 2024-06-27

## 2024-08-14 ENCOUNTER — OFFICE VISIT (OUTPATIENT)
Dept: SPORTS MEDICINE | Facility: CLINIC | Age: 39
End: 2024-08-14
Payer: COMMERCIAL

## 2024-08-14 ENCOUNTER — PATIENT MESSAGE (OUTPATIENT)
Dept: SPORTS MEDICINE | Facility: CLINIC | Age: 39
End: 2024-08-14

## 2024-08-14 DIAGNOSIS — M17.12 PRIMARY OSTEOARTHRITIS OF LEFT KNEE: ICD-10-CM

## 2024-08-14 DIAGNOSIS — M94.261 CHONDROMALACIA OF RIGHT KNEE: ICD-10-CM

## 2024-08-14 DIAGNOSIS — M17.11 PRIMARY OSTEOARTHRITIS OF RIGHT KNEE: Primary | ICD-10-CM

## 2024-08-14 DIAGNOSIS — M94.262 CHONDROMALACIA OF LEFT KNEE: ICD-10-CM

## 2024-08-14 NOTE — PROGRESS NOTES
Telemedicine/Virtual Visit Documentation:     The patient location is: home- f/u from bilateral knee Gel-One on 5/1/24. She reports relief of pain in bilateral knees until 2-3 weeks ago. Right knee pain is 0/10. Left knee pain is 2-3/10. She is still experiencing mild pain along the medial aspect of the left knee. She would like to continue to hold off on arthroscopic surgery of left knee and continue with visco supplementation at this time.    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 She has recently lost 20lbs since October 2023 with dieting. She has stopped standing and walking longer than 8 hours while at work. She also reports significant relief of pain and improvement of function from bilateral knee Gel-One injections she received on 10/27/24. She would like to receive this injection again when approved. Pain in right knee today is 1/10 and left knee is 0/10.     She reports right knee pain is typically worse than left. Pain is diffuse and worse with activity, specifically prolonged walking and standing, and pain is improved with rest. She uses an Aleve spray and diclofenac intermittently. She has decided to continue to wait on surgery as she feels HEP and injections are managing the pain. No other concerns at this time. She wears a brace on her right knee and patella tendon strap on her left knee.    VISIT TYPE X   Virtual visit with synchronous audio and video X   Telephone E/M service      Total time spent with patient: see X elan on chart below.   More than half of the time was spent counseling or coordinating care including prognosis, differential diagnosis, risks and benefits of treatment, instructions, compliance risk reductions     EST MINUTES X   38872 5    48989 10    08159 15 X   99214 25    13043 40    NEW     67689 10    99858 20    04958 30     36307 45    54725 60    [facility]      5-10    34986 11-20    56235 21-30      MRI left knee: 10/17/23  FINDINGS:  Menisci: Slight irregular morphology at the periphery of the junction of the body segment and posterior horn of the medial meniscus with a linear area of intermediate signal that closely approximates the inferior articular surface (coronal series 8001, image 16).  Lateral meniscus demonstrates preserved morphology and signal intensity.  Intact anterior and posterior root ligaments.     Ligaments: ACL mucoid degeneration.  Attenuated fibular collateral ligament, possibly an anatomic variant.  Mild edema signal about the superficial MCL/posteromedial capsule.  PCL and remaining posterolateral corner structures are intact.     Extensor Mechanism: Patellofemoral alignment is maintained.  Quadriceps and patellar tendons are intact.  MPFL and medial/lateral retinacula are normal.     Cartilage:     *Patellofemoral: Partial-thickness chondral fissuring at the central and medial trochlea with a small focal area subchondral edema.  Patellar cartilage is grossly preserved.  *Medial tibiofemoral: Superficial chondral fissuring at the central weight-bearing femoral condyle.  No full-thickness defects or subchondral edema.  *Lateral tibiofemoral: Intact without partial or full-thickness defects.  No subchondral edema.  Bones: Subcortical marrow edema at the anterior aspect of the tibial spines, likely reactive.  No fractures.  No avascular necrosis.  No marrow infiltrative process.     Miscellaneous: Small volume of intra-articular fluid.  Small popliteal cyst.  Medial gastrocnemius, lateral gastrocnemius, distal semimembranosus and visualized pes anserine tendons are intact.  Distal iliotibial band is normal.  Visualized neurovascular structures demonstrate no significant abnormalities.    Assessment:  Bilateral knee chondromalacia  Primary osteoarthritis right knee  Primary osteoarthritis left knee  Possible  left knee medial meniscus tear    Plan:  We have discussed a variety of treatment options including medications, injections, physical therapy and other alternative treatments. I also explained the indications, risks and benefits of surgery.  Patient's pain is refractory HEP, conservative management, and NSAIDs. Pt would like to proceed with continued visco-supplementation. I recommend left knee arthroscopy if continued or worsening left knee pain.    Medical Necessity for viscosupplementation use: After thorough evaluation of the patient, I have determined that viscosupplementation treatment is medically necessary. The patient has painful degenerative joint disease (DJD) of the knee(s) with failure of conservative treatments including lifestyle modifications and rehabilitation exercises. Oral analgesics including NSAIDs have not adequately controlled the patient's symptoms. There is radiographic evidence of Kellgren-Terrance grade II (or greater) osteoarthritic (OA) changes, or if lack of radiographic evidence, there is arthroscopic or other evidence of chondrosis of the knee(s).     I made the decision to obtain old records of the patient including previous notes and imaging. I independently reviewed and interpreted lab results today as well as prior imaging.  Reviewed with patient.    1. Pre-authorization placed for bilateral  Gel one  injections.  2. Ice compress to the affected area 2-3x a day for 15-20 minutes as needed for pain management.  3. Continue Aleve spray and oral diclofenac as needed for pain.   4. Continue patellofemoral strength and conditioning protocol HEP  5. No standing or walking for longer than 8 hours a day.   6.  RTC to see Leandra Patrick PA-C for bilateral  Gel One  injections in 3 months.    All of the patient's questions were answered and the patient will contact us if they have any questions or concerns in the interim.

## 2024-09-20 ENCOUNTER — PATIENT MESSAGE (OUTPATIENT)
Dept: SPORTS MEDICINE | Facility: CLINIC | Age: 39
End: 2024-09-20
Payer: COMMERCIAL

## 2024-09-24 DIAGNOSIS — E66.09 CLASS 2 OBESITY DUE TO EXCESS CALORIES WITH BODY MASS INDEX (BMI) OF 35.0 TO 35.9 IN ADULT, UNSPECIFIED WHETHER SERIOUS COMORBIDITY PRESENT: ICD-10-CM

## 2024-09-24 RX ORDER — OMEPRAZOLE 40 MG/1
CAPSULE, DELAYED RELEASE ORAL
Qty: 90 CAPSULE | Refills: 0 | Status: SHIPPED | OUTPATIENT
Start: 2024-09-24

## 2024-09-24 NOTE — TELEPHONE ENCOUNTER
Care Due:                  Date            Visit Type   Department     Provider  --------------------------------------------------------------------------------                                MYCHART                              FOLLOWUP/OF  [facility]  Last Visit: 10-      [facility] VISIT   EZEQUIEL Campbell  Next Visit: None Scheduled  None         None Found                                                            Last  Test          Frequency    Reason                     Performed    Due Date  --------------------------------------------------------------------------------    Office Visit  12 months..  cholecalciferol,.........  10-   10-    Ca..........  12 months..  cholecalciferol,.........  10-   10-    Vitamin D...  12 months..  cholecalciferol,.........  10-   10-    [employer] Embedded Care Due Messages. Reference number: [id number].   9/24/2024 11:53:00 AM CDT

## 2024-10-01 ENCOUNTER — PATIENT MESSAGE (OUTPATIENT)
Dept: PRIMARY CARE CLINIC | Facility: CLINIC | Age: 39
End: 2024-10-01
Payer: COMMERCIAL

## 2024-10-17 ENCOUNTER — HOSPITAL ENCOUNTER (OUTPATIENT)
Dept: RADIOLOGY | Facility: HOSPITAL | Age: 39
Discharge: HOME | End: 2024-10-17
Attending: INTERNAL MEDICINE
Payer: COMMERCIAL

## 2024-10-17 ENCOUNTER — OFFICE VISIT (OUTPATIENT)
Dept: INTERNAL MEDICINE | Facility: CLINIC | Age: 39
End: 2024-10-17
Payer: COMMERCIAL

## 2024-10-17 ENCOUNTER — TELEPHONE (OUTPATIENT)
Dept: ORTHOPEDICS | Facility: CLINIC | Age: 39
End: 2024-10-17
Payer: COMMERCIAL

## 2024-10-17 VITALS
WEIGHT: 187.38 LBS | TEMPERATURE: 98 F | HEART RATE: 77 BPM | OXYGEN SATURATION: 100 % | HEIGHT: 64 IN | BODY MASS INDEX: 31.99 KG/M2 | SYSTOLIC BLOOD PRESSURE: 124 MMHG | DIASTOLIC BLOOD PRESSURE: 70 MMHG

## 2024-10-17 DIAGNOSIS — R20.0 NUMBNESS AND TINGLING IN LEFT HAND: ICD-10-CM

## 2024-10-17 DIAGNOSIS — R20.0 NUMBNESS AND TINGLING IN LEFT HAND: Primary | ICD-10-CM

## 2024-10-17 DIAGNOSIS — R20.2 NUMBNESS AND TINGLING IN LEFT HAND: Primary | ICD-10-CM

## 2024-10-17 DIAGNOSIS — R20.2 NUMBNESS AND TINGLING IN LEFT HAND: ICD-10-CM

## 2024-10-17 PROCEDURE — 3074F SYST BP LT 130 MM HG: CPT | Mod: CPTII,S$GLB,, | Performed by: INTERNAL MEDICINE

## 2024-10-17 PROCEDURE — 3044F HG A1C LEVEL LT 7.0%: CPT | Mod: CPTII,S$GLB,, | Performed by: INTERNAL MEDICINE

## 2024-10-17 PROCEDURE — 99214 OFFICE O/P EST MOD 30 MIN: CPT | Mod: S$GLB,,, | Performed by: INTERNAL MEDICINE

## 2024-10-17 PROCEDURE — 73130 X-RAY EXAM OF HAND: CPT | Mod: TC,PO,LT

## 2024-10-17 PROCEDURE — 99999 PR PBB SHADOW E&M-EST. PATIENT-LVL III: CPT | Mod: PBBFAC,,, | Performed by: INTERNAL MEDICINE

## 2024-10-17 PROCEDURE — 73130 X-RAY EXAM OF HAND: CPT | Mod: 26,LT,, | Performed by: RADIOLOGY

## 2024-10-17 PROCEDURE — 3008F BODY MASS INDEX DOCD: CPT | Mod: CPTII,S$GLB,, | Performed by: INTERNAL MEDICINE

## 2024-10-17 PROCEDURE — 3078F DIAST BP <80 MM HG: CPT | Mod: CPTII,S$GLB,, | Performed by: INTERNAL MEDICINE

## 2024-10-17 RX ORDER — TOPIRAMATE 50 MG/1
50 TABLET, FILM COATED ORAL
COMMUNITY
Start: 2024-05-21 | End: 2024-11-07

## 2024-10-17 NOTE — TELEPHONE ENCOUNTER
Called patient to schedule her an appointment with provider. Patient stated she no longer need an appointment because she was seen at the [place] location this morning.

## 2024-10-17 NOTE — TELEPHONE ENCOUNTER
----- Message from Med Assistant Parekh sent at 10/17/2024  9:48 AM CDT -----  Regarding: RE: PT'S REQUESTING A CALL BACK FROM STAFF REGARDING SHEDULING TO DAY FOR HAND AND SHOULDER PAIN  Contact: PT   doesn't see shoulders either we normally refer them to Sports or Main for the shoulder.  ----- Message -----  From: Jesusita Hook MA  Sent: 10/17/2024   9:44 AM CDT  To: Izabella Faust MA  Subject: RE: PT'S REQUESTING A CALL BACK FROM STAFF R#    Dr. Agrawal doesn't see shoulders.  ----- Message -----  From: Izabella Faust MA  Sent: 10/17/2024   9:29 AM CDT  To: Nghia De Jesus  Subject: FW: PT'S REQUESTING A CALL BACK FROM STAFF R#    Antwan this patient previously seen  do you guys want to see if she wants to se him .  ----- Message -----  From: Luz Elena Muir  Sent: 10/17/2024   8:47 AM CDT  To: [facility] Hand Clinical Support Staff  Subject: PT'S REQUESTING A CALL BACK FROM STAFF ROSETTAAR#    Confirmed contact info below:  Contact Name: Mela Blanco  Phone Number: [phone]

## 2024-10-17 NOTE — PROGRESS NOTES
History of Present Illness    CHIEF COMPLAINT:  Mela presents today for numbness and throbbing in left pinky finger.    CURRENT SYMPTOMS:  She reports sudden onset of numbness in her left pinky finger after using the restroom. She describes a throbbing sensation in the entire finger, with symptoms extending up her arm and into her shoulder. She notes more pronounced veins in the affected arm compared to the other side and slight swelling in her left hand. She denies any neck pain or recent trauma aside from carrying bags. She is right-handed.    MEDICAL HISTORY:  She has a history of carpal tunnel surgery on both hands. She mentions a previous shoulder issue occurring 2-4 years ago, for which she received a cortisone injection. She also discloses a history of gastric band surgery.    MEDICATIONS:  She is currently taking omeprazole for reflux, Flonase nasal spray, Voltaren gel, vitamin D, Zyrtec, caffeine tablets, and topiramate 50 mg. She has an IUD in place. She denies current use of Zofran, Bactroban ointment, or mupirocin.    OCCUPATION AND LIFESTYLE:  She is employed as a  and reports working long hours recently, particularly on the day following a holiday, due to increased workload.    CONCERNS:  She expresses concern about the possibility of a blood clot due to pronounced veins in the affected area. She acknowledges that this would be an unusual location for a clot but is seeking reassurance. She denies any swelling or symptoms in the arm, which she understands would be a more likely site for clotting. She is requesting an x-ray to investigate potential structural issues related to her symptoms.      ROS:  Musculoskeletal: -joint swelling  Neurological: +numbness       PAST MEDICAL HISTORY:  Past Medical History:   Diagnosis Date    Bariatric surgery status 5/25/2015    BMI 32.0-32.9,adult 11/27/2019    Current use of proton pump inhibitor 11/27/2019    IUD (intrauterine device) in place  11/27/2019    Miscarriage     NSAID induced gastritis 3/7/2018    Perennial allergic rhinitis 11/27/2019    Seborrheic dermatitis of scalp 6/23/2020    Vitamin D insufficiency 11/27/2019       SURGICAL HISTORY:  Past Surgical History:   Procedure Laterality Date    breast reduction      BREAST SURGERY  [id number]    breast reduction    CARPAL TUNNEL RELEASE Right 01/12/2018    CARPAL TUNNEL RELEASE Left 10/19/2018    Procedure: RELEASE, CARPAL TUNNEL;  Surgeon: Michael Agrawal Jr., MD;  Location: [facility];  Service: Plastics;  Laterality: Left;    COSMETIC SURGERY      ESOPHAGOGASTRODUODENOSCOPY N/A 08/27/2021    Procedure: EGD (ESOPHAGOGASTRODUODENOSCOPY);  Surgeon: Quinn Jensen MD;  Location: [facility] ([address] [place] [address]);  Service: Endoscopy;  Laterality: N/A;  BMI 34.67-urgent case   Possible slipped band        will be out of town until day before-needs rapid, arrival time 8:30-GT    hematoma surgery      LAPAROSCOPIC GASTRIC BANDING      LAPAROSCOPIC REMOVAL OF GASTRIC BAND N/A 09/30/2021    Procedure: REMOVAL, GASTRIC BAND, LAPAROSCOPIC;  Surgeon: Abhilash Cullen Jr., MD;  [address];  Service: General;  Laterality: N/A;       MEDS:  Medcard reviewed and updated    ALLERGIES: Allergy Card reviewed and updated    SOCIAL HISTORY:   The patient is a nonsmoker.    PE:   APPEARANCE: Well nourished, well developed, in no acute distress.    CHEST: Lungs clear to auscultation with unlabored respirations.  CARDIOVASCULAR: Normal S1, S2. No murmurs. No carotid bruits. No pedal edema.  ABDOMEN: Bowel sounds normal. Not distended. Soft. No tenderness or masses.  PSYCHIATRIC: The patient is oriented to person, place, and time and has a pleasant affect.        ASSESSMENT/PLAN:  Mela was seen today for numbness.    Diagnoses and all orders for this visit:    Numbness and tingling in left hand  -     X-Ray Hand 3 View Left; Future  -     Ambulatory referral/consult to Neurology; Future        Assessed  patient's left hand and arm for numbness and swelling, suspecting possible nerve compression  Considered differential diagnoses including pinched nerve, structural issues, and less likely venous thrombosis  Decided against oral NSAIDs due to patient's history of gastric band surgery  Opted for conservative management with topical anti-inflammatory medication before pursuing more invasive interventions  Ordered hand x-ray to rule out structural abnormalities  Recommend neurology referral for potential nerve conduction study if symptoms persist    NERVE INJURY:  - Explained that nerve injuries can often heal themselves with time.  - Referred to neurology for evaluation of potential nerve compression and consideration of nerve conduction study.  - Follow up with neurology per referral.  - Cancel neurology appointment if symptoms resolve before the scheduled visit.    POTENTIAL BLOOD CLOTS:  - Discussed signs of potential blood clots, including swelling and trouble breathing.  - Clarified that pronounced veins in the hand are unlikely to indicate clotting.    HAND PAIN/INJURY:  - Started Voltaren gel: Apply topically to affected left hand.  - Continued use of Aleve spray as previously prescribed.  - X-ray of left hand ordered.

## 2024-10-22 ENCOUNTER — TELEPHONE (OUTPATIENT)
Dept: SPORTS MEDICINE | Facility: CLINIC | Age: 39
End: 2024-10-22
Payer: COMMERCIAL

## 2024-10-22 NOTE — TELEPHONE ENCOUNTER
----- Message from Germán Patrick PA-C sent at 10/22/2024  3:16 PM CDT -----  Regarding: RE: PT IS WANTING TO SEE WHO WOULD GERMÁN REFER HER TO FOR A HAND APPT?  Contact: PT  Guillermo Garcia or Dr. Cosme  ----- Message -----  From: Luz Elena Muir  Sent: 10/22/2024   3:12 PM CDT  To: Celina Mobley Staff  Subject: PT IS WANTING TO SEE WHO WOULD GERMÁN REFER #    Confirmed contact info below:  Contact Name: Mela Blanco  Phone Number: [phone]

## 2024-10-22 NOTE — TELEPHONE ENCOUNTER
Spoke to the patient in regard to recommendation for hand concerns. While on the call the patient informed me that she had an urgent care visit due to numbness in the left pinky finger. She stated she was scheduled with a neurologist but the appointment isn't until December and she prefer to be seen sooner. The patient accepted an appointment on 10/23 at 3:30pm with Dr. Garcia. Appointment location confirmed.

## 2024-10-23 ENCOUNTER — OFFICE VISIT (OUTPATIENT)
Dept: OPTOMETRY | Facility: CLINIC | Age: 39
End: 2024-10-23
Payer: COMMERCIAL

## 2024-10-23 ENCOUNTER — TELEPHONE (OUTPATIENT)
Dept: ORTHOPEDICS | Facility: CLINIC | Age: 39
End: 2024-10-23
Payer: COMMERCIAL

## 2024-10-23 DIAGNOSIS — H52.13 MYOPIA OF BOTH EYES WITH ASTIGMATISM: Primary | ICD-10-CM

## 2024-10-23 DIAGNOSIS — H52.203 MYOPIA OF BOTH EYES WITH ASTIGMATISM: Primary | ICD-10-CM

## 2024-10-23 DIAGNOSIS — Z46.0 FITTING AND ADJUSTMENT OF SPECTACLES AND CONTACT LENSES: Primary | ICD-10-CM

## 2024-10-23 DIAGNOSIS — Z83.511 FAMILY HISTORY OF GLAUCOMA IN MOTHER: ICD-10-CM

## 2024-10-23 PROCEDURE — 99999 PR PBB SHADOW E&M-EST. PATIENT-LVL III: CPT | Mod: PBBFAC,,, | Performed by: OPTOMETRIST

## 2024-10-23 PROCEDURE — 92014 COMPRE OPH EXAM EST PT 1/>: CPT | Mod: S$GLB,,, | Performed by: OPTOMETRIST

## 2024-10-23 PROCEDURE — 1160F RVW MEDS BY RX/DR IN RCRD: CPT | Mod: CPTII,S$GLB,, | Performed by: OPTOMETRIST

## 2024-10-23 PROCEDURE — 92015 DETERMINE REFRACTIVE STATE: CPT | Mod: S$GLB,,, | Performed by: OPTOMETRIST

## 2024-10-23 PROCEDURE — 1159F MED LIST DOCD IN RCRD: CPT | Mod: CPTII,S$GLB,, | Performed by: OPTOMETRIST

## 2024-10-23 PROCEDURE — 99999 PR PBB SHADOW E&M-EST. PATIENT-LVL II: CPT | Mod: PBBFAC,,, | Performed by: OPTOMETRIST

## 2024-10-23 RX ORDER — FERROUS SULFATE 325(65) MG
325 TABLET, DELAYED RELEASE (ENTERIC COATED) ORAL
COMMUNITY

## 2024-10-23 RX ORDER — SULFAMETHOXAZOLE AND TRIMETHOPRIM 800; 160 MG/1; MG/1
TABLET ORAL
COMMUNITY

## 2024-10-23 RX ORDER — METRONIDAZOLE 500 MG/1
TABLET ORAL
COMMUNITY

## 2024-10-23 RX ORDER — MONTELUKAST SODIUM 10 MG/1
TABLET ORAL
COMMUNITY

## 2024-10-23 RX ORDER — DOXYCYCLINE HYCLATE 100 MG
TABLET ORAL
COMMUNITY

## 2024-10-23 RX ORDER — POTASSIUM CHLORIDE 600 MG/1
1 TABLET, FILM COATED, EXTENDED RELEASE ORAL EVERY MORNING
COMMUNITY

## 2024-10-23 RX ORDER — TRAMADOL HYDROCHLORIDE 50 MG/1
TABLET ORAL
COMMUNITY

## 2024-10-23 RX ORDER — FLUCONAZOLE 200 MG/1
TABLET ORAL
COMMUNITY

## 2024-10-23 RX ORDER — PHENTERMINE HYDROCHLORIDE 37.5 MG/1
1 TABLET ORAL EVERY MORNING
COMMUNITY

## 2024-10-23 RX ORDER — MULTIVITAMIN
TABLET ORAL DAILY
COMMUNITY

## 2024-10-23 NOTE — TELEPHONE ENCOUNTER
----- Message from Neil Saucedo sent at 10/22/2024  4:34 PM CDT -----  HEY SHE NEEDS TO GET SCHEDULE WITH STEPHANIE

## 2024-10-23 NOTE — TELEPHONE ENCOUNTER
Spoke with patient to schedule an appointment. Offered Friday or next Friday. Patient stated she will call back when she's ready to schedule appointment with provider.

## 2024-10-24 ENCOUNTER — TELEPHONE (OUTPATIENT)
Dept: NEUROLOGY | Facility: CLINIC | Age: 39
End: 2024-10-24
Payer: COMMERCIAL

## 2024-10-25 ENCOUNTER — OFFICE VISIT (OUTPATIENT)
Dept: NEUROLOGY | Facility: CLINIC | Age: 39
End: 2024-10-25
Payer: COMMERCIAL

## 2024-10-25 DIAGNOSIS — R20.2 NUMBNESS AND TINGLING IN LEFT HAND: ICD-10-CM

## 2024-10-25 DIAGNOSIS — R20.2 LEFT HAND PARESTHESIA: Primary | ICD-10-CM

## 2024-10-25 DIAGNOSIS — R20.0 NUMBNESS AND TINGLING IN LEFT HAND: ICD-10-CM

## 2024-10-25 PROCEDURE — 99204 OFFICE O/P NEW MOD 45 MIN: CPT | Mod: 95,,, | Performed by: PSYCHIATRY & NEUROLOGY

## 2024-10-25 NOTE — PROGRESS NOTES
ARABELLA    LEONOR: 03/23 with Dr. Coats  Chief complaint (CC): Patient is here for annual eye exam today.  Patient   hasn't noticed any vision changes since the last exam. Glasses and   contacts still seem fine.  Glasses? +  Contacts? +  H/o eye surgery, injections or laser: -  H/o eye injury: -  Known eye conditions? See above  Family h/o eye conditions? -  Eye gtts? -      (-) Flashes (-)  Floaters (-) Mucous   (-)  Tearing (-) Itching (--) Burning   (-) Headaches (-) Eye Pain/discomfort (-) Irritation   (-)  Redness (-) Double vision (-) Blurry vision    Diabetic? -  A1c? -      Last edited by Parul Portillo on 10/23/2024  8:24 AM.            Assessment /Plan     For exam results, see Encounter Report.    Myopia of both eyes with astigmatism    Family history of glaucoma in mother      CLRx and SRx released to patient. Patient educated on lens options. Normal ocular health. RTC 1 year for routine exam.

## 2024-10-25 NOTE — PROGRESS NOTES
Neurology Telemedicine Note     Name: Mela Blanco  MRN: [id number]   CSN: [id number]      Date: 10/25/2024    The patient location is: Home  The chief complaint leading to consultation is: numbness  Visit type: Virtual visit with synchronous audio and video    TOTAL TIME SPENT: 35 mins    Each patient to whom I provide medical services by telemedicine is:  (1) informed of the relationship between the physician and patient and the respective role of any other health care provider with respect to management of the patient; and (2) notified that they may decline to receive medical services by telemedicine and may withdraw from such care at any time.    History of Present Illness (HPI):  Patient is a 38-year-old female with past medical history as below who presents to tele Neurology Clinic due to numbness in her left pinky since Tuesday of last week.  Patient states it was more numb of the tip but it has been consistently numb.  She went to urgent care it was recommended that she do a nerve study.  X-ray is negative.  Patient denies any clinical history of optic neuritis or transverse myelitis.  She does not have any visual changes.  There maybe some tenderness in her elbow at the medial epicondyle but she was unable to take anti-inflammatories due to history of bariatric surgery.    Nonmotor/Premotor ROS: as per HPI, and all other systems are negative    Past Medical History: The patient  has a past medical history of Bariatric surgery status (5/25/2015), BMI 32.0-32.9,adult (11/27/2019), Current use of proton pump inhibitor (11/27/2019), IUD (intrauterine device) in place (11/27/2019), Miscarriage, NSAID induced gastritis (3/7/2018), Perennial allergic rhinitis (11/27/2019), Seborrheic dermatitis of scalp (6/23/2020), and Vitamin D insufficiency (11/27/2019).    Social History: The patient  reports that she has never smoked. She has never used smokeless tobacco. She reports that she does not currently use  alcohol. She reports that she does not use drugs.    Family History: Their family history includes Arthritis in her father; Diabetes in her maternal grandmother and mother; Glaucoma in her mother; Hyperlipidemia in her mother; Hypertension in her father and mother.    Allergies: Patient has no known allergies.     Meds:   Current Outpatient Medications on File Prior to Visit   Medication Sig Dispense Refill    caffeine 200 mg Tab Take 200 mg by mouth every 4 (four) hours as needed.      cetirizine (ZYRTEC) 10 MG tablet Take 1 tablet (10 mg total) by mouth daily as needed for Allergies. 90 tablet 3    cholecalciferol, vitamin D3, (VITAMIN D3) 50 mcg (2,000 unit) Cap capsule Take 1 capsule (2,000 Units total) by mouth once daily. 90 capsule 1    copper intrauterine device (PARAGARD T 380A) 380 square mm IUD 1 Intra Uterine Device by Intrauterine route once.      diclofenac sodium (VOLTAREN) 1 % Gel APPLY 2 GRAMS TOPICALLY TO THE AFFECTED AREA THREE TIMES DAILY 100 g 2    doxycycline (VIBRA-TABS) 100 MG tablet       ferrous sulfate 325 (65 FE) MG EC tablet Take 325 mg by mouth.      fluconazole (DIFLUCAN) 200 MG Tab       fluticasone propionate (FLONASE) 50 mcg/actuation nasal spray 2 sprays (100 mcg total) by Each Nostril route 2 (two) times a day. 15.8 mL 1    metroNIDAZOLE (FLAGYL) 500 MG tablet       montelukast (SINGULAIR) 10 mg tablet       multivitamin with folic acid 400 mcg Tab Take by mouth once daily.      omeprazole (PRILOSEC) 40 MG capsule TAKE ONE CAPSULE BY MOUTH EVERY MORNING. OPEN CAPSULE AND TAKE WITH APPLE SAUCE. 90 capsule 0    phentermine (ADIPEX-P) 37.5 mg tablet Take 1 tablet by mouth every morning.      potassium chloride (KLOR-CON) 8 MEQ TbSR Take 1 tablet by mouth every morning.      sulfamethoxazole-trimethoprim 800-160mg (BACTRIM DS) 800-160 mg Tab       topiramate (TOPAMAX) 50 MG tablet Take 50 mg by mouth.      traMADoL (ULTRAM) 50 mg tablet        No current facility-administered  medications on file prior to visit.       Exam:  Vital Signs deferred with home visit    Constitutional  Well-developed, well-nourished, appears stated age   Eyes  No scleral icterus   ENT  Moist oral mucosa   Cardiovascular  No lower extremity edema    Respiratory  No labored breathing    Skin  No rashes   Hematologic  No bruising   Psychiatric  Normal mood and affect   Other  GI/ deferred    Neurological     * Mental status  Alert and oriented to person, place, time, and situation; no dysarthria; no aphasia; normal recent and remote memory; follows commands   * Cranial nerves     - CN II  Pupils midposition and symmetric   - CN III, IV, VI  Extraocular movements full, no nystagmus visualized   - CN V  Unable to test   - CN VII  Face strong and symmetric bilaterally    - CN VIII  Hearing grossly intact with conversation    - CN IX, X  Palate raises midline and symmetric    - CN XI  Strong shoulder shrug B    - CN XII  Tongue appears midline    * Motor  Normal bulk by appearance, no drift    * Sensory  Not tested objectively, no changes described by the patient   * Deep tendon reflexes  Not tested   * Coord/Movemt/Gait No hypophonic speech.  No facial masking.  No B bradykinesia.  No tremor with rest, posture, kinesis, or intention.   No chorea, athetosis, dystonia, myoclonus, or tics.   No motor impersistence.  Gait is deferred for safety.       Medical Record Review:  - Lab Results:  No visits with results within 3 Month(s) from this visit.   Latest known visit with results is:   Lab Visit on 10/17/2023   Component Date Value Ref Range Status    Vitamin B-12 10/18/2023 419  210 - 950 pg/mL Final    Vit D, 25-Hydroxy 10/18/2023 36  30 - 96 ng/mL Final    Thiamine 10/18/2023 52  38 - 122 ug/L Final    Sodium 10/18/2023 139  136 - 145 mmol/L Final    Potassium 10/18/2023 4.6  3.5 - 5.1 mmol/L Final    Chloride 10/18/2023 108  95 - 110 mmol/L Final    CO2 10/18/2023 21 (L)  23 - 29 mmol/L Final    Glucose 10/18/2023  90  70 - 110 mg/dL Final    BUN 10/18/2023 11  6 - 20 mg/dL Final    Creatinine 10/18/2023 0.8  0.5 - 1.4 mg/dL Final    Calcium 10/18/2023 9.6  8.7 - 10.5 mg/dL Final    Total Protein 10/18/2023 7.0  6.0 - 8.4 g/dL Final    Albumin 10/18/2023 3.7  3.5 - 5.2 g/dL Final    Total Bilirubin 10/18/2023 0.4  0.1 - 1.0 mg/dL Final    Alkaline Phosphatase 10/18/2023 64  55 - 135 U/L Final    AST 10/18/2023 15  10 - 40 U/L Final    ALT 10/18/2023 15  10 - 44 U/L Final    eGFR 10/18/2023 >60.0  >60 mL/min/1.73 m^2 Final    Anion Gap 10/18/2023 10  8 - 16 mmol/L Final    WBC 10/18/2023 3.34 (L)  3.90 - 12.70 K/uL Final    RBC 10/18/2023 4.20  4.00 - 5.40 M/uL Final    Hemoglobin 10/18/2023 11.3 (L)  12.0 - 16.0 g/dL Final    Hematocrit 10/18/2023 36.5 (L)  37.0 - 48.5 % Final    MCV 10/18/2023 87  82 - 98 fL Final    MCH 10/18/2023 26.9 (L)  27.0 - 31.0 pg Final    MCHC 10/18/2023 31.0 (L)  32.0 - 36.0 g/dL Final    RDW 10/18/2023 15.1 (H)  11.5 - 14.5 % Final    Platelets 10/18/2023 456 (H)  150 - 450 K/uL Final    MPV 10/18/2023 9.4  9.2 - 12.9 fL Final    Immature Granulocytes 10/18/2023 0.3  0.0 - 0.5 % Final    Gran # (ANC) 10/18/2023 1.1 (L)  1.8 - 7.7 K/uL Final    Immature Grans (Abs) 10/18/2023 0.01  0.00 - 0.04 K/uL Final    Lymph # 10/18/2023 1.6  1.0 - 4.8 K/uL Final    Mono # 10/18/2023 0.5  0.3 - 1.0 K/uL Final    Eos # 10/18/2023 0.1  0.0 - 0.5 K/uL Final    Baso # 10/18/2023 0.02  0.00 - 0.20 K/uL Final    nRBC 10/18/2023 0  0 /100 WBC Final    Gran % 10/18/2023 33.8 (L)  38.0 - 73.0 % Final    Lymph % 10/18/2023 48.5 (H)  18.0 - 48.0 % Final    Mono % 10/18/2023 13.8  4.0 - 15.0 % Final    Eosinophil % 10/18/2023 3.0  0.0 - 8.0 % Final    Basophil % 10/18/2023 0.6  0.0 - 1.9 % Final    Differential Method 10/18/2023 Automated   Final    Ferritin 10/18/2023 18 (L)  20.0 - 300.0 ng/mL Final    Iron 10/18/2023 51  30 - 160 ug/dL Final    Transferrin 10/18/2023 282  200 - 375 mg/dL Final    TIBC 10/18/2023 417   250 - 450 ug/dL Final    Saturated Iron 10/18/2023 12 (L)  20 - 50 % Final    TSH 10/18/2023 0.454  0.400 - 4.000 uIU/mL Final    Magnesium 10/18/2023 2.1  1.6 - 2.6 mg/dL Final    Zinc 10/18/2023 88  60 - 130 ug/dL Final    Folate 10/18/2023 5.4  4.0 - 24.0 ng/mL Final       Diagnoses:   1) L hand paresthesia- isolated in the 5th digit since last week. Given age and lack of other symptoms, will obtain MRI to r/o demyelination      Medical Decision Makin) Serum labs for numbness  2) MRI brain and C spine w/wo  3) EMG/NCS    F/u after above completed      I spent 35 minutes with the patient with >50% of the time spent with counseling and education regarding:    This is a consult performed through audio-visual using [employer] serafin. Pt and provider are in different locations. History and physical exam are limited due to the nature of this encounter.       Christina Corley MD

## 2024-10-30 ENCOUNTER — OFFICE VISIT (OUTPATIENT)
Dept: PRIMARY CARE CLINIC | Facility: CLINIC | Age: 39
End: 2024-10-30
Payer: COMMERCIAL

## 2024-10-30 VITALS
OXYGEN SATURATION: 95 % | HEART RATE: 95 BPM | BODY MASS INDEX: 32.39 KG/M2 | DIASTOLIC BLOOD PRESSURE: 82 MMHG | WEIGHT: 188.69 LBS | SYSTOLIC BLOOD PRESSURE: 130 MMHG

## 2024-10-30 DIAGNOSIS — M25.511 ACUTE PAIN OF RIGHT SHOULDER: ICD-10-CM

## 2024-10-30 DIAGNOSIS — Z00.00 ANNUAL PHYSICAL EXAM: Primary | ICD-10-CM

## 2024-10-30 DIAGNOSIS — Z98.84 HISTORY OF BARIATRIC SURGERY: ICD-10-CM

## 2024-10-30 DIAGNOSIS — E66.811 OBESITY (BMI 30.0-34.9): ICD-10-CM

## 2024-10-30 DIAGNOSIS — R52 PAIN AT INJECTION SITE, SUBSEQUENT ENCOUNTER: ICD-10-CM

## 2024-10-30 DIAGNOSIS — T80.89XD PAIN AT INJECTION SITE, SUBSEQUENT ENCOUNTER: ICD-10-CM

## 2024-10-30 PROCEDURE — 3079F DIAST BP 80-89 MM HG: CPT | Mod: CPTII,S$GLB,, | Performed by: STUDENT IN AN ORGANIZED HEALTH CARE EDUCATION/TRAINING PROGRAM

## 2024-10-30 PROCEDURE — 3008F BODY MASS INDEX DOCD: CPT | Mod: CPTII,S$GLB,, | Performed by: STUDENT IN AN ORGANIZED HEALTH CARE EDUCATION/TRAINING PROGRAM

## 2024-10-30 PROCEDURE — 3075F SYST BP GE 130 - 139MM HG: CPT | Mod: CPTII,S$GLB,, | Performed by: STUDENT IN AN ORGANIZED HEALTH CARE EDUCATION/TRAINING PROGRAM

## 2024-10-30 PROCEDURE — 99396 PREV VISIT EST AGE 40-64: CPT | Mod: S$GLB,,, | Performed by: STUDENT IN AN ORGANIZED HEALTH CARE EDUCATION/TRAINING PROGRAM

## 2024-10-30 PROCEDURE — 99999 PR PBB SHADOW E&M-EST. PATIENT-LVL IV: CPT | Mod: PBBFAC,,, | Performed by: STUDENT IN AN ORGANIZED HEALTH CARE EDUCATION/TRAINING PROGRAM

## 2024-10-30 PROCEDURE — 1159F MED LIST DOCD IN RCRD: CPT | Mod: CPTII,S$GLB,, | Performed by: STUDENT IN AN ORGANIZED HEALTH CARE EDUCATION/TRAINING PROGRAM

## 2024-11-06 ENCOUNTER — PATIENT MESSAGE (OUTPATIENT)
Dept: ORTHOPEDICS | Facility: CLINIC | Age: 39
End: 2024-11-06

## 2024-11-06 ENCOUNTER — HOSPITAL ENCOUNTER (OUTPATIENT)
Dept: RADIOLOGY | Facility: HOSPITAL | Age: 39
Discharge: HOME | End: 2024-11-06
Attending: PHYSICIAN ASSISTANT
Payer: COMMERCIAL

## 2024-11-06 ENCOUNTER — PATIENT MESSAGE (OUTPATIENT)
Dept: INTERNAL MEDICINE | Facility: CLINIC | Age: 39
End: 2024-11-06
Payer: COMMERCIAL

## 2024-11-06 ENCOUNTER — OFFICE VISIT (OUTPATIENT)
Dept: ORTHOPEDICS | Facility: CLINIC | Age: 39
End: 2024-11-06
Payer: COMMERCIAL

## 2024-11-06 DIAGNOSIS — R52 PAIN AT INJECTION SITE, SUBSEQUENT ENCOUNTER: ICD-10-CM

## 2024-11-06 DIAGNOSIS — T80.89XD PAIN AT INJECTION SITE, SUBSEQUENT ENCOUNTER: ICD-10-CM

## 2024-11-06 DIAGNOSIS — M75.51 SUBACROMIAL BURSITIS OF RIGHT SHOULDER JOINT: Primary | ICD-10-CM

## 2024-11-06 DIAGNOSIS — M25.511 ACUTE PAIN OF RIGHT SHOULDER: ICD-10-CM

## 2024-11-06 PROCEDURE — 99999 PR PBB SHADOW E&M-EST. PATIENT-LVL III: CPT | Mod: PBBFAC,,, | Performed by: PHYSICIAN ASSISTANT

## 2024-11-06 PROCEDURE — 73030 X-RAY EXAM OF SHOULDER: CPT | Mod: TC,RT

## 2024-11-06 RX ORDER — LIDOCAINE HYDROCHLORIDE 10 MG/ML
2 INJECTION, SOLUTION INFILTRATION; PERINEURAL
Status: DISCONTINUED | OUTPATIENT
Start: 2024-11-06 | End: 2024-11-06 | Stop reason: HOSPADM

## 2024-11-06 RX ORDER — TRIAMCINOLONE ACETONIDE 40 MG/ML
40 INJECTION, SUSPENSION INTRA-ARTICULAR; INTRAMUSCULAR
Status: DISCONTINUED | OUTPATIENT
Start: 2024-11-06 | End: 2024-11-06 | Stop reason: HOSPADM

## 2024-11-06 RX ADMIN — TRIAMCINOLONE ACETONIDE 40 MG: 40 INJECTION, SUSPENSION INTRA-ARTICULAR; INTRAMUSCULAR at 10:11

## 2024-11-06 RX ADMIN — LIDOCAINE HYDROCHLORIDE 2 ML: 10 INJECTION, SOLUTION INFILTRATION; PERINEURAL at 10:11

## 2024-11-06 NOTE — PROGRESS NOTES
SUBJECTIVE:     Chief Complaint & History of Present Illness:    Mela Blanco is a 38 y.o. year old female who presents today with right arm pain for for over one month.  She is Right hand dominant.  She states she had HPV vaccine in September and the shoulder pain never improved after this. The pain is located in the posterior aspect of the shoulder.  The pain is described as achy.  It is worse with activity- such a reaching, lifting, overhead activity.  She has pain at night.  Previous treatments include rest, topical creams.  There is not a history of previous injury or surgery to the shoulder.      Review of patient's allergies indicates:  No Known Allergies      Current Outpatient Medications   Medication Sig Dispense Refill    caffeine 200 mg Tab Take 200 mg by mouth every 4 (four) hours as needed.      cetirizine (ZYRTEC) 10 MG tablet Take 1 tablet (10 mg total) by mouth daily as needed for Allergies. 90 tablet 3    cholecalciferol, vitamin D3, (VITAMIN D3) 50 mcg (2,000 unit) Cap capsule Take 1 capsule (2,000 Units total) by mouth once daily. 90 capsule 1    diclofenac sodium (VOLTAREN) 1 % Gel APPLY 2 GRAMS TOPICALLY TO THE AFFECTED AREA THREE TIMES DAILY 100 g 2    ferrous sulfate 325 (65 FE) MG EC tablet Take 325 mg by mouth.      fluticasone propionate (FLONASE) 50 mcg/actuation nasal spray 2 sprays (100 mcg total) by Each Nostril route 2 (two) times a day. 15.8 mL 1    levonorgestreL (MIRENA) 52 mg IUD 52,000 mcg by Intrauterine route.      metroNIDAZOLE (FLAGYL) 500 MG tablet       montelukast (SINGULAIR) 10 mg tablet       multivitamin with folic acid 400 mcg Tab Take by mouth once daily.      omeprazole (PRILOSEC) 40 MG capsule TAKE ONE CAPSULE BY MOUTH EVERY MORNING. OPEN CAPSULE AND TAKE WITH APPLE SAUCE. 90 capsule 0    phentermine (ADIPEX-P) 37.5 mg tablet Take 1 tablet by mouth every morning.      potassium chloride (KLOR-CON) 8 MEQ TbSR Take 1 tablet by mouth every morning.       topiramate (TOPAMAX) 50 MG tablet Take 50 mg by mouth.      traMADoL (ULTRAM) 50 mg tablet        No current facility-administered medications for this visit.       Past Medical History:   Diagnosis Date    Bariatric surgery status 5/25/2015    BMI 32.0-32.9,adult 11/27/2019    Current use of proton pump inhibitor 11/27/2019    IUD (intrauterine device) in place 11/27/2019    Miscarriage     NSAID induced gastritis 3/7/2018    Perennial allergic rhinitis 11/27/2019    Seborrheic dermatitis of scalp 6/23/2020    Vitamin D insufficiency 11/27/2019       Past Surgical History:   Procedure Laterality Date    breast reduction      BREAST SURGERY  [id number]    breast reduction    CARPAL TUNNEL RELEASE Right 01/12/2018    CARPAL TUNNEL RELEASE Left 10/19/2018    Procedure: RELEASE, CARPAL TUNNEL;  Surgeon: Michael Agrawal Jr., MD;  Location: [facility];  Service: Plastics;  Laterality: Left;    COSMETIC SURGERY      ESOPHAGOGASTRODUODENOSCOPY N/A 08/27/2021    Procedure: EGD (ESOPHAGOGASTRODUODENOSCOPY);  Surgeon: Quinn Jensen MD;  Location: [address]);  Service: Endoscopy;  Laterality: N/A;  BMI 34.67-urgent case   Possible slipped band        will be out of town until day before-needs rapid, arrival time 8:30-GT    hematoma surgery      LAPAROSCOPIC GASTRIC BANDING      LAPAROSCOPIC REMOVAL OF GASTRIC BAND N/A 09/30/2021    Procedure: REMOVAL, GASTRIC BAND, LAPAROSCOPIC;  Surgeon: Abhilash Cullen Jr., MD;  Location: [address];  Service: General;  Laterality: N/A;       Review of Systems:  ROS:  Constitutional: no fever or chills  Eyes: no visual changes  ENT: no nasal congestion or sore throat  Respiratory: no cough or shortness of breath  Musculoskeletal: no arthralgias or myalgias      OBJECTIVE:     PHYSICAL EXAM:    General:   There is no height or weight on file to calculate BMI.  Patient is alert, awake and oriented to time, place and person. Mood and affect are appropriate.  Patient does not  appear to be in any distress, denies any constitutional symptoms and appears stated age.   HEENT: Pupils are equal and round, sclera are not injected. External examination of ears and nose reveals no abnormalities. Cranial nerves II-X are grossly intact  Neck: examination demonstrates painless  active range of motion. Spurling's sign is negative  Skin: no rashes, abrasions or open wounds on the affected extremity   Resp: No respiratory distress or audible wheezing   Psych:  normal mood and behavior  CV: 2+  pulses, all extremities warm and well perfused   Right Shoulder   Scapular winging-  Scapular dyskinesia -  Tenderness: posterior acromial  Range of motion is painful   ROM: forward flexion 165, external rotation 50/50, internal rotation T7/T7  Shoulder Strength: biceps 5/5, triceps 5/5, abduction 5/5, adduction 5/5  positive for impingement sign, sensory exam normal, and motor exam normal  Stability tests: normal  Special Tests:    Crossbody test: negative    Neer's positive  Hawkin's negative    Damon's negative  Drop arm negative    IMAGING:  X-rays of the right shoulder, personally reviewed by me, demonstrate no acute abnormality.  No fracture or dislocation.     ASSESSMENT     1. Subacromial bursitis of right shoulder joint    2. Acute pain of right shoulder        PLAN:     Discussed with the patient at length all the different treatment options available for her right shoulder including anti-inflammatories, acetaminophen, rest, ice, Physical therapy, occasional cortisone injections for temporary relief, and shoulder arthroscopy    - She elected to proceed with right shoulder CSI today- see procedure note  - Recommend rest, ice, activity modification, home exercises  - Doesn't tolerate NSAIDS well- will avoid for now  - Consider MRI if no improvement in 2-3 weeks  - Follow up if symptoms worsen or fail to improve

## 2024-11-06 NOTE — PROCEDURES
Large Joint Aspiration/Injection: R subacromial bursa    Date/Time: 11/6/2024 10:30 AM    Performed by: Tara Dodson PA-C  Authorized by: Tara Dodson PA-C    Consent Done?:  Yes (Verbal)  Indications:  Pain  Timeout: prior to procedure the correct patient, procedure, and site was verified    Prep: patient was prepped and draped in usual sterile fashion    Local anesthetic:  Topical anesthetic    Details:  Needle Size:  22 G  Approach:  Posterior  Location:  Shoulder  Site:  R subacromial bursa  Medications:  40 mg triamcinolone acetonide 40 mg/mL; 2 mL LIDOcaine HCL 10 mg/ml (1%) 10 mg/mL (1 %)  Patient tolerance:  Patient tolerated the procedure well with no immediate complications

## 2024-11-07 NOTE — TELEPHONE ENCOUNTER
Pt seeking after visit for 10/17 office visit      0/17 referral to neurology placed following office visit for numbness/tingling in L hand; Pt saw neurologist on 10/25

## 2024-11-08 NOTE — TELEPHONE ENCOUNTER
Pt returned call. Pt is very upset and frustrated. States she has been receiving the run around with her care. Pt states Dr Ochoa's notes from her visit on 10/17 are inaccurate. States she doesn't have a hx of HTN and the reason for her visit was not for f/u of HTN, it was for numbness to her 5th finger. Assisted pt in scheduling appt.

## 2024-11-10 ENCOUNTER — HOSPITAL ENCOUNTER (OUTPATIENT)
Dept: RADIOLOGY | Facility: HOSPITAL | Age: 39
Discharge: HOME | End: 2024-11-10
Attending: PSYCHIATRY & NEUROLOGY
Payer: COMMERCIAL

## 2024-11-10 DIAGNOSIS — R20.2 LEFT HAND PARESTHESIA: ICD-10-CM

## 2024-11-10 PROCEDURE — 72156 MRI NECK SPINE W/O & W/DYE: CPT | Mod: TC

## 2024-11-10 PROCEDURE — 70553 MRI BRAIN STEM W/O & W/DYE: CPT | Mod: 26,,, | Performed by: RADIOLOGY

## 2024-11-10 PROCEDURE — A9585 GADOBUTROL INJECTION: HCPCS | Performed by: PSYCHIATRY & NEUROLOGY

## 2024-11-10 PROCEDURE — 25500020 PHARM REV CODE 255: Performed by: PSYCHIATRY & NEUROLOGY

## 2024-11-10 PROCEDURE — 72156 MRI NECK SPINE W/O & W/DYE: CPT | Mod: 26,,, | Performed by: RADIOLOGY

## 2024-11-10 PROCEDURE — 70553 MRI BRAIN STEM W/O & W/DYE: CPT | Mod: TC

## 2024-11-10 RX ORDER — GADOBUTROL 604.72 MG/ML
9 INJECTION INTRAVENOUS
Status: COMPLETED | OUTPATIENT
Start: 2024-11-10 | End: 2024-11-10

## 2024-11-10 RX ADMIN — GADOBUTROL 9 ML: 604.72 INJECTION INTRAVENOUS at 11:11

## 2024-11-12 ENCOUNTER — LAB VISIT (OUTPATIENT)
Dept: LAB | Facility: HOSPITAL | Age: 39
End: 2024-11-12
Attending: PSYCHIATRY & NEUROLOGY
Payer: COMMERCIAL

## 2024-11-12 DIAGNOSIS — E66.811 OBESITY (BMI 30.0-34.9): ICD-10-CM

## 2024-11-12 DIAGNOSIS — R20.2 LEFT HAND PARESTHESIA: ICD-10-CM

## 2024-11-12 DIAGNOSIS — Z98.84 HISTORY OF BARIATRIC SURGERY: ICD-10-CM

## 2024-11-12 DIAGNOSIS — Z00.00 ANNUAL PHYSICAL EXAM: ICD-10-CM

## 2024-11-12 LAB
25(OH)D3+25(OH)D2 SERPL-MCNC: 32 NG/ML (ref 30–96)
ALBUMIN SERPL BCP-MCNC: 3.7 G/DL (ref 3.5–5.2)
ALP SERPL-CCNC: 73 U/L (ref 40–150)
ALT SERPL W/O P-5'-P-CCNC: 25 U/L (ref 10–44)
ANION GAP SERPL CALC-SCNC: 7 MMOL/L (ref 8–16)
AST SERPL-CCNC: 18 U/L (ref 10–40)
BASOPHILS # BLD AUTO: 0.02 K/UL (ref 0–0.2)
BASOPHILS NFR BLD: 0.4 % (ref 0–1.9)
BILIRUB SERPL-MCNC: 0.3 MG/DL (ref 0.1–1)
BUN SERPL-MCNC: 16 MG/DL (ref 6–20)
CALCIUM SERPL-MCNC: 9.1 MG/DL (ref 8.7–10.5)
CHLORIDE SERPL-SCNC: 108 MMOL/L (ref 95–110)
CHOLEST SERPL-MCNC: 157 MG/DL (ref 120–199)
CHOLEST/HDLC SERPL: 2.6 {RATIO} (ref 2–5)
CO2 SERPL-SCNC: 23 MMOL/L (ref 23–29)
CREAT SERPL-MCNC: 0.9 MG/DL (ref 0.5–1.4)
DIFFERENTIAL METHOD BLD: ABNORMAL
EOSINOPHIL # BLD AUTO: 0 K/UL (ref 0–0.5)
EOSINOPHIL NFR BLD: 0.6 % (ref 0–8)
ERYTHROCYTE [DISTWIDTH] IN BLOOD BY AUTOMATED COUNT: 15.7 % (ref 11.5–14.5)
EST. GFR  (NO RACE VARIABLE): >60 ML/MIN/1.73 M^2
ESTIMATED AVG GLUCOSE: 100 MG/DL (ref 68–131)
ESTIMATED AVG GLUCOSE: 100 MG/DL (ref 68–131)
FERRITIN SERPL-MCNC: 11 NG/ML (ref 20–300)
FOLATE SERPL-MCNC: 11 NG/ML (ref 4–24)
FOLATE SERPL-MCNC: 13.2 NG/ML (ref 4–24)
GLUCOSE SERPL-MCNC: 87 MG/DL (ref 70–110)
HBA1C MFR BLD: 5.1 % (ref 4–5.6)
HBA1C MFR BLD: 5.1 % (ref 4–5.6)
HCT VFR BLD AUTO: 39.4 % (ref 37–48.5)
HDLC SERPL-MCNC: 60 MG/DL (ref 40–75)
HDLC SERPL: 38.2 % (ref 20–50)
HGB BLD-MCNC: 12.3 G/DL (ref 12–16)
IMM GRANULOCYTES # BLD AUTO: 0 K/UL (ref 0–0.04)
IMM GRANULOCYTES NFR BLD AUTO: 0 % (ref 0–0.5)
IRON SERPL-MCNC: 20 UG/DL (ref 30–160)
LDLC SERPL CALC-MCNC: 86.8 MG/DL (ref 63–159)
LYMPHOCYTES # BLD AUTO: 2.5 K/UL (ref 1–4.8)
LYMPHOCYTES NFR BLD: 51.7 % (ref 18–48)
MAGNESIUM SERPL-MCNC: 2.1 MG/DL (ref 1.6–2.6)
MCH RBC QN AUTO: 28 PG (ref 27–31)
MCHC RBC AUTO-ENTMCNC: 31.2 G/DL (ref 32–36)
MCV RBC AUTO: 90 FL (ref 82–98)
MONOCYTES # BLD AUTO: 0.5 K/UL (ref 0.3–1)
MONOCYTES NFR BLD: 9.7 % (ref 4–15)
NEUTROPHILS # BLD AUTO: 1.8 K/UL (ref 1.8–7.7)
NEUTROPHILS NFR BLD: 37.6 % (ref 38–73)
NONHDLC SERPL-MCNC: 97 MG/DL
NRBC BLD-RTO: 0 /100 WBC
PLATELET # BLD AUTO: 386 K/UL (ref 150–450)
PMV BLD AUTO: 9.6 FL (ref 9.2–12.9)
POTASSIUM SERPL-SCNC: 4.2 MMOL/L (ref 3.5–5.1)
PROT SERPL-MCNC: 6.9 G/DL (ref 6–8.4)
RBC # BLD AUTO: 4.4 M/UL (ref 4–5.4)
SATURATED IRON: 5 % (ref 20–50)
SODIUM SERPL-SCNC: 138 MMOL/L (ref 136–145)
TOTAL IRON BINDING CAPACITY: 413 UG/DL (ref 250–450)
TRANSFERRIN SERPL-MCNC: 279 MG/DL (ref 200–375)
TRIGL SERPL-MCNC: 51 MG/DL (ref 30–150)
TSH SERPL DL<=0.005 MIU/L-ACNC: 0.83 UIU/ML (ref 0.4–4)
VIT B12 SERPL-MCNC: 424 PG/ML (ref 210–950)
VIT B12 SERPL-MCNC: 445 PG/ML (ref 210–950)
WBC # BLD AUTO: 4.74 K/UL (ref 3.9–12.7)

## 2024-11-12 PROCEDURE — 82306 VITAMIN D 25 HYDROXY: CPT | Performed by: STUDENT IN AN ORGANIZED HEALTH CARE EDUCATION/TRAINING PROGRAM

## 2024-11-12 PROCEDURE — 82607 VITAMIN B-12: CPT | Mod: 91 | Performed by: STUDENT IN AN ORGANIZED HEALTH CARE EDUCATION/TRAINING PROGRAM

## 2024-11-12 PROCEDURE — 83036 HEMOGLOBIN GLYCOSYLATED A1C: CPT | Performed by: STUDENT IN AN ORGANIZED HEALTH CARE EDUCATION/TRAINING PROGRAM

## 2024-11-12 PROCEDURE — 80061 LIPID PANEL: CPT | Performed by: STUDENT IN AN ORGANIZED HEALTH CARE EDUCATION/TRAINING PROGRAM

## 2024-11-12 PROCEDURE — 83735 ASSAY OF MAGNESIUM: CPT | Performed by: STUDENT IN AN ORGANIZED HEALTH CARE EDUCATION/TRAINING PROGRAM

## 2024-11-12 PROCEDURE — 83036 HEMOGLOBIN GLYCOSYLATED A1C: CPT | Mod: 91 | Performed by: PSYCHIATRY & NEUROLOGY

## 2024-11-12 PROCEDURE — 82607 VITAMIN B-12: CPT | Performed by: PSYCHIATRY & NEUROLOGY

## 2024-11-12 PROCEDURE — 80053 COMPREHEN METABOLIC PANEL: CPT | Performed by: STUDENT IN AN ORGANIZED HEALTH CARE EDUCATION/TRAINING PROGRAM

## 2024-11-12 PROCEDURE — 82728 ASSAY OF FERRITIN: CPT | Performed by: STUDENT IN AN ORGANIZED HEALTH CARE EDUCATION/TRAINING PROGRAM

## 2024-11-12 PROCEDURE — 84466 ASSAY OF TRANSFERRIN: CPT | Performed by: STUDENT IN AN ORGANIZED HEALTH CARE EDUCATION/TRAINING PROGRAM

## 2024-11-12 PROCEDURE — 86038 ANTINUCLEAR ANTIBODIES: CPT | Performed by: PSYCHIATRY & NEUROLOGY

## 2024-11-12 PROCEDURE — 84425 ASSAY OF VITAMIN B-1: CPT | Performed by: STUDENT IN AN ORGANIZED HEALTH CARE EDUCATION/TRAINING PROGRAM

## 2024-11-12 PROCEDURE — 82746 ASSAY OF FOLIC ACID SERUM: CPT | Performed by: PSYCHIATRY & NEUROLOGY

## 2024-11-12 PROCEDURE — 85025 COMPLETE CBC W/AUTO DIFF WBC: CPT | Performed by: STUDENT IN AN ORGANIZED HEALTH CARE EDUCATION/TRAINING PROGRAM

## 2024-11-12 PROCEDURE — 84443 ASSAY THYROID STIM HORMONE: CPT | Performed by: STUDENT IN AN ORGANIZED HEALTH CARE EDUCATION/TRAINING PROGRAM

## 2024-11-12 PROCEDURE — 84207 ASSAY OF VITAMIN B-6: CPT | Performed by: PSYCHIATRY & NEUROLOGY

## 2024-11-12 PROCEDURE — 82746 ASSAY OF FOLIC ACID SERUM: CPT | Mod: 91 | Performed by: STUDENT IN AN ORGANIZED HEALTH CARE EDUCATION/TRAINING PROGRAM

## 2024-11-13 ENCOUNTER — PATIENT MESSAGE (OUTPATIENT)
Dept: PRIMARY CARE CLINIC | Facility: CLINIC | Age: 39
End: 2024-11-13
Payer: COMMERCIAL

## 2024-11-13 LAB — ANA SER QL IF: NORMAL

## 2024-11-14 NOTE — TELEPHONE ENCOUNTER
Since her hemoglobin is normal, she wouldn't qualify for the iron infusions. Definitely be sure to continue the daily supplementation. The heavy menstrual cycles would be a good idea to discuss more with her gynecologist t see if we can decrease blood loss.

## 2024-11-15 ENCOUNTER — TELEPHONE (OUTPATIENT)
Dept: PRIMARY CARE CLINIC | Facility: CLINIC | Age: 39
End: 2024-11-15
Payer: COMMERCIAL

## 2024-11-15 NOTE — TELEPHONE ENCOUNTER
Unsure of patient's concerns, these were not discussed at annual. Hemoglobin normal on CBC. No anemia is seen, just low iron level. If she has heavy cycles, we should have her see her gynecologist to discuss management. If she has additional concerns, needs appt.

## 2024-11-15 NOTE — TELEPHONE ENCOUNTER
----- Message from Suresh sent at 11/15/2024 12:48 PM CST -----  Regarding: Irene Kennedy - Pt Callback Request  Contact: Pt [phone]  .[email or web address]     Patient is calling for Medical Advice regarding: Patient request a callback from nurse to discuss appointments coming up. She said she was looking for an update on clinic notes. Please call back to discuss.    How long has patient had these symptoms:    Pharmacy name and phone#:    Patient wants a call back or thru [facility]: Call    Comments:    Please advise patient replies from provider may take up to 48 hours.

## 2024-11-17 ENCOUNTER — PATIENT MESSAGE (OUTPATIENT)
Dept: OPTOMETRY | Facility: CLINIC | Age: 39
End: 2024-11-17
Payer: COMMERCIAL

## 2024-11-18 LAB
PYRIDOXAL SERPL-MCNC: 19 UG/L (ref 5–50)
PYRIDOXAL SERPL-MCNC: 19 UG/L (ref 5–50)
VIT B1 BLD-MCNC: 83 UG/L (ref 38–122)

## 2024-11-21 ENCOUNTER — PROCEDURE VISIT (OUTPATIENT)
Dept: NEUROLOGY | Facility: CLINIC | Age: 39
End: 2024-11-21
Payer: COMMERCIAL

## 2024-11-21 ENCOUNTER — PATIENT MESSAGE (OUTPATIENT)
Dept: INTERNAL MEDICINE | Facility: CLINIC | Age: 39
End: 2024-11-21
Payer: COMMERCIAL

## 2024-11-21 DIAGNOSIS — R20.2 LEFT HAND PARESTHESIA: ICD-10-CM

## 2024-11-21 PROCEDURE — 95913 NRV CNDJ TEST 13/> STUDIES: CPT | Mod: S$GLB,,, | Performed by: PSYCHIATRY & NEUROLOGY

## 2024-11-22 NOTE — TELEPHONE ENCOUNTER
Attempted to contact pt regarding pts request to get her 10/17 after visit notes via [employer]. No answer lvm to call office back for assistance.

## 2024-12-06 ENCOUNTER — TELEPHONE (OUTPATIENT)
Dept: NEUROLOGY | Facility: CLINIC | Age: 39
End: 2024-12-06
Payer: COMMERCIAL

## 2024-12-06 NOTE — TELEPHONE ENCOUNTER
"I spoke with Ms. Blanco in regards to her cancelled appointment per Dr. Solano:  "patient already saw neurologist after the referral, also had NCS/EMG done already." Patient was very frustrated and felt that Dr. Solano just did not want to she her and was refusing her. Patient said that she has not heard from Dr. Corley's clinic and no one has returned her calls for a follow-up. Patient continued to state that she wants it noted in her chart that " provider refused to see her" , so she can add it in with the other issues she is having. I tried to explain to her that was not the case and Dr. Solano's reasoning that she should follow up with Dr. Corley for the results of her EMG but patient stated that I was over talking her and did not understand. I did not speak anymore and let patient talk. Understandably, patient was upset. Patient verified my full name then call ended.    "

## 2024-12-06 NOTE — TELEPHONE ENCOUNTER
"Left voicemail for patient per Dr. Solano :  "patient already saw neurologist after the referral, also had NCS/EMG done already."  Patient was seen by Chika Wayne on October 25, 2024 from referral placed due to left hand paresthesia. Patient had EMG done from referral placed by Dr. Corley on November 21, 2024. Patient to follow-up with that provider.   "

## 2024-12-15 DIAGNOSIS — E66.09 CLASS 2 OBESITY DUE TO EXCESS CALORIES WITH BODY MASS INDEX (BMI) OF 35.0 TO 35.9 IN ADULT, UNSPECIFIED WHETHER SERIOUS COMORBIDITY PRESENT: ICD-10-CM

## 2024-12-15 DIAGNOSIS — E66.812 CLASS 2 OBESITY DUE TO EXCESS CALORIES WITH BODY MASS INDEX (BMI) OF 35.0 TO 35.9 IN ADULT, UNSPECIFIED WHETHER SERIOUS COMORBIDITY PRESENT: ICD-10-CM

## 2024-12-15 NOTE — TELEPHONE ENCOUNTER
No care due was identified.  [facility] Embedded Care Due Messages. Reference number: [id number].   12/15/2024 11:19:14 AM CST

## 2024-12-16 RX ORDER — OMEPRAZOLE 40 MG/1
CAPSULE, DELAYED RELEASE ORAL
Qty: 90 CAPSULE | Refills: 0 | Status: SHIPPED | OUTPATIENT
Start: 2024-12-16

## 2025-02-13 ENCOUNTER — TELEPHONE (OUTPATIENT)
Dept: SPORTS MEDICINE | Facility: CLINIC | Age: 40
End: 2025-02-13
Payer: COMMERCIAL

## 2025-02-13 NOTE — TELEPHONE ENCOUNTER
Spoke to the patient in regard to her appointment with Leandra Patrick PA-C  on 2/14. While on the call I informed the patient that her injection is still pending for approval and her appt needed to be rescheduled to allow more time for auth. She verbally expressed she understood. She accepted an appointment on 2/20 at 1pm with Leandra Patrick PA-C at the [facility] location.

## 2025-02-20 ENCOUNTER — OFFICE VISIT (OUTPATIENT)
Dept: SPORTS MEDICINE | Facility: CLINIC | Age: 40
End: 2025-02-20
Payer: COMMERCIAL

## 2025-02-20 VITALS
WEIGHT: 205.5 LBS | SYSTOLIC BLOOD PRESSURE: 129 MMHG | BODY MASS INDEX: 35.08 KG/M2 | HEIGHT: 64 IN | DIASTOLIC BLOOD PRESSURE: 88 MMHG | HEART RATE: 76 BPM

## 2025-02-20 DIAGNOSIS — M17.12 PRIMARY OSTEOARTHRITIS OF LEFT KNEE: ICD-10-CM

## 2025-02-20 DIAGNOSIS — M17.11 PRIMARY OSTEOARTHRITIS OF RIGHT KNEE: Primary | ICD-10-CM

## 2025-02-20 RX ORDER — DICLOFENAC SODIUM 10 MG/G
2 GEL TOPICAL 3 TIMES DAILY
Qty: 1 EACH | Refills: 2 | Status: SHIPPED | OUTPATIENT
Start: 2025-02-20

## 2025-02-20 NOTE — PROGRESS NOTES
Patient is here for follow up of bilateral knee arthritis. Pt is requesting bilateral knee GelOne injections.  PMFH reviewed per encounter record. Has failed other conservative modalities including NSAIDS, activity modification, weight loss.    The prior shot was tolerated well.    PHYSICAL EXAMINATION:     General: The patient is alert and oriented x 3. Mood is pleasant.   Observation of ears, eyes and nose reveals no gross abnormalities. No   labored breathing observed.     No signs of infection or adverse reaction to knee.    PROCEDURE NOTE:  Injection Procedure bilateral knees  A time out was performed, including verification of patient ID, procedure, site and side, availability of information and equipment, review of safety issues, and agreement with consent, the procedure site was marked.    After time out was performed, the patient was prepped aseptically with povidone-iodine swabsticks. A diagnostic and therapeutic injection of 3cc GelOne was given under sterile technique using a 22g x 1.5 needle from the Superolateral  aspect of the bilateral Knee Joint in the supine position.      Mela Blanco had no adverse reactions to the medication. Pain decreased. She was instructed to apply ice to the joint for 20 minutes and avoid strenuous activities for 24-36 hours following the injection. She was warned of possible blood sugar and/or blood pressure changes during that time. Following that time, she can resume regular activities.    She was reminded to call the clinic immediately for any adverse side effects as explained in clinic today.    RTC in 6 months with Leandra Patrick PA-C for possible repeat visco supplementation.  All questions were answered, pt will contact us for questions or concerns in the interim.    Voltaren gel sent to pharmacy.

## 2025-03-09 DIAGNOSIS — E66.09 CLASS 2 OBESITY DUE TO EXCESS CALORIES WITH BODY MASS INDEX (BMI) OF 35.0 TO 35.9 IN ADULT, UNSPECIFIED WHETHER SERIOUS COMORBIDITY PRESENT: ICD-10-CM

## 2025-03-09 DIAGNOSIS — E66.812 CLASS 2 OBESITY DUE TO EXCESS CALORIES WITH BODY MASS INDEX (BMI) OF 35.0 TO 35.9 IN ADULT, UNSPECIFIED WHETHER SERIOUS COMORBIDITY PRESENT: ICD-10-CM

## 2025-03-09 DIAGNOSIS — J30.9 ALLERGIC RHINITIS, UNSPECIFIED SEASONALITY, UNSPECIFIED TRIGGER: ICD-10-CM

## 2025-03-09 NOTE — TELEPHONE ENCOUNTER
No care due was identified.  [facility] Embedded Care Due Messages. Reference number: [id number].   3/09/2025 4:31:52 PM CDT

## 2025-03-10 RX ORDER — FLUTICASONE PROPIONATE 50 MCG
2 SPRAY, SUSPENSION (ML) NASAL 2 TIMES DAILY
Qty: 15.8 ML | Refills: 1 | Status: SHIPPED | OUTPATIENT
Start: 2025-03-10

## 2025-03-10 RX ORDER — OMEPRAZOLE 40 MG/1
CAPSULE, DELAYED RELEASE ORAL
Qty: 90 CAPSULE | Refills: 0 | Status: SHIPPED | OUTPATIENT
Start: 2025-03-10

## 2025-04-14 ENCOUNTER — PATIENT MESSAGE (OUTPATIENT)
Dept: SPORTS MEDICINE | Facility: CLINIC | Age: 40
End: 2025-04-14
Payer: COMMERCIAL

## 2025-05-01 ENCOUNTER — OFFICE VISIT (OUTPATIENT)
Dept: SPORTS MEDICINE | Facility: CLINIC | Age: 40
End: 2025-05-01
Payer: COMMERCIAL

## 2025-05-01 DIAGNOSIS — M17.12 PRIMARY OSTEOARTHRITIS OF LEFT KNEE: ICD-10-CM

## 2025-05-01 DIAGNOSIS — Z12.31 OTHER SCREENING MAMMOGRAM: ICD-10-CM

## 2025-05-01 DIAGNOSIS — M17.11 PRIMARY OSTEOARTHRITIS OF RIGHT KNEE: Primary | ICD-10-CM

## 2025-05-01 NOTE — LETTER
Patient: Mela Blanco   YOB: 1985   Clinic Number: [id number]   Today's Date: May 1, 2025        Work Status     Mela was seen by Leandra Patrick PA-C on 5/1/2025.    Specific restrictions: Please limit her activity to no walking longer than 8 hours per day. Please allow her to wear a custom and specific tennis shoe/sneaker instead of the required tactical boot due to boot increasing her knee pain.    If you have any questions or concerns, please feel free to contact the office at [phone].    Thank you.    Leandra Patrick PA-C      Signature: ______  ____________________________________________

## 2025-05-01 NOTE — PROGRESS NOTES
The patient location is: home  The chief complaint leading to consultation is: F/u bilateral knees after bilateral knee Gel One injections on 2/20/25    Visit type: audiovisual    Face to Face time with patient: 14 minutes  30 minutes of total time spent on the encounter, which includes face to face time and non-face to face time preparing to see the patient (eg, review of tests), Obtaining and/or reviewing separately obtained history, Documenting clinical information in the electronic or other health record, Independently interpreting results (not separately reported) and communicating results to the patient/family/caregiver, or Care coordination (not separately reported).     Each patient to whom he or she provides medical services by telemedicine is:  (1) informed of the relationship between the physician and patient and the respective role of any other health care provider with respect to management of the patient; and (2) notified that he or she may decline to receive medical services by telemedicine and may withdraw from such care at any time.    Notes:   She is following up after bilateral knee Gel One injections given on 2/20/25. She reports going to work as a  following the injection which was difficult and very painful. Pain subsided after three days post injection. She has been wearing brace on right knee. Left knee is doing well. 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 She states that she now weighs 192 lbs.     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 She has recently lost 20lbs since October 2023 with dieting. She has stopped standing and walking longer than 8 hours while at work. She also reports significant relief of pain and improvement of function from bilateral knee Gel-One injections she received on 10/27/24. She would like to receive this injection again  when approved. Pain in right knee today is 1/10 and left knee is 0/10.     She reports right knee pain is typically worse than left. Pain is diffuse and worse with activity, specifically prolonged walking and standing, and pain is improved with rest. She uses an Aleve spray and diclofenac intermittently. She has decided to continue to wait on surgery as she feels HEP and injections are managing the pain. No other concerns at this time. She wears a brace on her right knee and patella tendon strap on her left knee.    RADIOGRAPHS: 3/23/23  FINDINGS:  Minimal mild tricompartment DJD changes particularly medial left patellofemoral joint with linea 3 mm size joint foreign body, cartilage calcification, limited vacuum joint lateral compartment left knee joint, additional DJD changes medial compartments and right anterior tibial plateau, remote tiny subcutaneous dystrophic calcifications pretibial.  No definite joint effusions.    Assessment:  1.Primary osteoarthritis right knee  2. Primary osteoarthritis left knee    Plan:  We have discussed a variety of treatment options including medications, injections, physical therapy and other alternative treatments. I also explained the indications, risks and benefits of surgery.  Patient's pain is refractory HEP, conservative management, and NSAIDs. Pt would like to proceed with continued visco-supplementation.    Medical Necessity for viscosupplementation use: After thorough evaluation of the patient, I have determined that viscosupplementation treatment is medically necessary. The patient has painful degenerative joint disease (DJD) of the knee(s) with failure of conservative treatments including lifestyle modifications and rehabilitation exercises. Oral analgesics including NSAIDs have not adequately controlled the patient's symptoms. There is radiographic evidence of Kellgren-Terrance grade II (or greater) osteoarthritic (OA) changes, or if lack of radiographic evidence, there  is arthroscopic or other evidence of chondrosis of the knee(s).     I made the decision to obtain old records of the patient including previous notes and imaging. I independently reviewed and interpreted lab results today as well as prior imaging.      Pre-authorization placed for bilateral  Gel One  injections.   Ice compress to the affected area 2-3x a day for 15-20 minutes as needed for pain management.  Note provided for work to allow her to wear tennis shoes instead of the tactical boots required due to worsening of knee pain. Note also limiting her walking to no longer than 8 hours per day.  RTC to see Leandra Patrick PA-C for bilateral  Gel One  injections on 8/20/25 or later.    All of the patient's questions were answered and the patient will contact us if they have any questions or concerns in the interim.

## 2025-05-05 ENCOUNTER — OFFICE VISIT (OUTPATIENT)
Dept: DERMATOLOGY | Facility: CLINIC | Age: 40
End: 2025-05-05
Payer: COMMERCIAL

## 2025-05-05 DIAGNOSIS — L98.9 DERMATOSIS OF SCALP: Primary | ICD-10-CM

## 2025-05-05 DIAGNOSIS — M25.562 ARTHRALGIA OF BOTH KNEES: ICD-10-CM

## 2025-05-05 DIAGNOSIS — M25.561 ARTHRALGIA OF BOTH KNEES: ICD-10-CM

## 2025-05-05 RX ORDER — FLUOCINONIDE TOPICAL SOLUTION USP, 0.05% 0.5 MG/ML
SOLUTION TOPICAL 2 TIMES DAILY
Qty: 60 ML | Refills: 1 | Status: SHIPPED | OUTPATIENT
Start: 2025-05-05

## 2025-05-05 RX ORDER — KETOCONAZOLE 20 MG/ML
SHAMPOO, SUSPENSION TOPICAL
Qty: 120 ML | Refills: 11 | Status: SHIPPED | OUTPATIENT
Start: 2025-05-05

## 2025-05-05 NOTE — PROGRESS NOTES
Subjective:      Patient ID:  Mela Blanco is a 39 y.o. female who presents for No chief complaint on file.    The patient location is: [place], [place]  The chief complaint leading to consultation is: scalp issue    Visit type: audiovisual    Face to Face time with patient: 10  15 minutes of total time spent on the encounter, which includes face to face time and non-face to face time preparing to see the patient (eg, review of tests), Obtaining and/or reviewing separately obtained history, Documenting clinical information in the electronic or other health record, Independently interpreting results (not separately reported) and communicating results to the patient/family/caregiver, or Care coordination (not separately reported).         Each patient to whom he or she provides medical services by telemedicine is:  (1) informed of the relationship between the physician and patient and the respective role of any other health care provider with respect to management of the patient; and (2) notified that he or she may decline to receive medical services by telemedicine and may withdraw from such care at any time.    Notes:     C/o scalp issues. States she was prescribed a shampoo for the scalp and an rx solution which were helpful. Intermittent flares of dryness, flaking, itching.  Denies other rashes of body. Endorses arthralgias of bilateral knees (right > left), states told it could be OA or a meniscal tear.  Endorses morning stiffness.     States mother has h/o scalp issue.           Review of Systems    Objective:   Physical Exam     Diagram Legend     Erythematous scaling macule/papule c/w actinic keratosis       Vascular papule c/w angioma      Pigmented verrucoid papule/plaque c/w seborrheic keratosis      Yellow umbilicated papule c/w sebaceous hyperplasia      Irregularly shaped tan macule c/w lentigo     1-2 mm smooth white papules consistent with Milia      Movable subcutaneous cyst with punctum  c/w epidermal inclusion cyst      Subcutaneous movable cyst c/w pilar cyst      Firm pink to brown papule c/w dermatofibroma      Pedunculated fleshy papule(s) c/w skin tag(s)      Evenly pigmented macule c/w junctional nevus     Mildly variegated pigmented, slightly irregular-bordered macule c/w mildly atypical nevus      Flesh colored to evenly pigmented papule c/w intradermal nevus       Pink pearly papule/plaque c/w basal cell carcinoma      Erythematous hyperkeratotic cursted plaque c/w SCC      Surgical scar with no sign of skin cancer recurrence      Open and closed comedones      Inflammatory papules and pustules      Verrucoid papule consistent consistent with wart     Erythematous eczematous patches and plaques     Dystrophic onycholytic nail with subungual debris c/w onychomycosis     Umbilicated papule    Erythematous-base heme-crusted tan verrucoid plaque consistent with inflamed seborrheic keratosis     Erythematous Silvery Scaling Plaque c/w Psoriasis     See annotation                Assessment / Plan:        Dermatosis of scalp  -     ketoconazole (NIZORAL) 2 % shampoo; Apply topically twice a week. Shampoo scalp 1-2 times weekly. Lather x 5 minutes then rinse well.  Dispense: 120 mL; Refill: 11  -     fluocinonide (LIDEX) 0.05 % external solution; Apply topically 2 (two) times daily. PRN scalp flares.  Dispense: 60 mL; Refill: 1  Ddx: seb derm vs. Scalp PsO vs. Other  Trial of above rx. Discussed waxing and waning and meds prn flares vs. Maintenance.     Arthralgia of both knees  Stable, following with ortho. Discussed potential PsA if scalp c/w PsO.  Would reconsider rheum referral if not improved.             Follow up in about 4 months (around 9/5/2025).

## 2025-05-07 ENCOUNTER — HOSPITAL ENCOUNTER (OUTPATIENT)
Dept: RADIOLOGY | Facility: HOSPITAL | Age: 40
Discharge: HOME | End: 2025-05-07
Attending: STUDENT IN AN ORGANIZED HEALTH CARE EDUCATION/TRAINING PROGRAM
Payer: COMMERCIAL

## 2025-05-07 VITALS — WEIGHT: 205 LBS | BODY MASS INDEX: 35 KG/M2 | HEIGHT: 64 IN

## 2025-05-07 DIAGNOSIS — Z12.31 OTHER SCREENING MAMMOGRAM: ICD-10-CM

## 2025-05-07 PROCEDURE — 77067 SCR MAMMO BI INCL CAD: CPT | Mod: 26,,, | Performed by: RADIOLOGY

## 2025-05-07 PROCEDURE — 77063 BREAST TOMOSYNTHESIS BI: CPT | Mod: TC

## 2025-05-07 PROCEDURE — 77063 BREAST TOMOSYNTHESIS BI: CPT | Mod: 26,,, | Performed by: RADIOLOGY

## 2025-05-08 ENCOUNTER — PATIENT MESSAGE (OUTPATIENT)
Dept: RESEARCH | Facility: HOSPITAL | Age: 40
End: 2025-05-08
Payer: COMMERCIAL

## 2025-05-09 ENCOUNTER — PATIENT MESSAGE (OUTPATIENT)
Dept: SPORTS MEDICINE | Facility: CLINIC | Age: 40
End: 2025-05-09
Payer: COMMERCIAL

## 2025-05-09 ENCOUNTER — RESULTS FOLLOW-UP (OUTPATIENT)
Dept: PRIMARY CARE CLINIC | Facility: CLINIC | Age: 40
End: 2025-05-09

## 2025-05-15 ENCOUNTER — PATIENT MESSAGE (OUTPATIENT)
Dept: SPORTS MEDICINE | Facility: CLINIC | Age: 40
End: 2025-05-15
Payer: COMMERCIAL

## 2025-05-28 DIAGNOSIS — E66.09 CLASS 2 OBESITY DUE TO EXCESS CALORIES WITH BODY MASS INDEX (BMI) OF 35.0 TO 35.9 IN ADULT, UNSPECIFIED WHETHER SERIOUS COMORBIDITY PRESENT: ICD-10-CM

## 2025-05-28 DIAGNOSIS — E66.812 CLASS 2 OBESITY DUE TO EXCESS CALORIES WITH BODY MASS INDEX (BMI) OF 35.0 TO 35.9 IN ADULT, UNSPECIFIED WHETHER SERIOUS COMORBIDITY PRESENT: ICD-10-CM

## 2025-05-28 RX ORDER — OMEPRAZOLE 40 MG/1
CAPSULE, DELAYED RELEASE ORAL
Qty: 90 CAPSULE | Refills: 0 | Status: SHIPPED | OUTPATIENT
Start: 2025-05-28

## 2025-05-28 NOTE — TELEPHONE ENCOUNTER
No care due was identified.  [facility] Embedded Care Due Messages. Reference number: [id number].   5/28/2025 4:34:58 PM CDT

## 2025-07-16 ENCOUNTER — PATIENT MESSAGE (OUTPATIENT)
Dept: OPTOMETRY | Facility: CLINIC | Age: 40
End: 2025-07-16
Payer: COMMERCIAL

## 2025-07-21 NOTE — DISCHARGE INSTRUCTIONS
Anesthesia: Monitored Anesthesia Care (MAC)  Youre due to have surgery. During surgery, youll be given medicine called anesthesia. This will keep you comfortable and pain-free. Your surgeon will use monitored anesthesia care (MAC). This sheet tells you more about this type of anesthesia.    What is monitored anesthesia care?  MAC keeps you very drowsy during surgery. You may be awake, but you will likely not remember much. And you wont feel pain. With MAC, medicines are given through an IV line into a vein in your arm or hand. A local anesthetic will usually be injected into the skin and muscle around the surgical site to numb it. The anesthesia provider monitors you during the procedure. He or she checks your heart rate and rhythm, blood pressure, and blood oxygen level.    Anesthesia tools and medicines that may be near you during your procedure  You will likely have:  · A pulse oximeter on the end of your finger. This measures your blood oxygen level.  · Electrocardiography leads (electrodes) on your chest. These record your heart rate and rhythm.  · Medicines given through an IV. These relax you and prevent pain. You may be awake or sleep lightly. If you have local anesthetic, it is injected directly into your skin.  · A facemask to give you oxygen, if needed.    Risks and possible complications  MAC has some risks. These include:  · Breathing problems  · Nausea and vomiting  · Allergic reaction to the anesthetic      Anesthesia safety  Tips for anesthesia safety include the following:   · Follow all instructions you are given for how long not to eat or drink before your procedure.  · Be sure your healthcare provider knows what medicines you take, especially any anti-inflammatory medicine or blood thinners. This includes aspirin and any other over-the-counter medicines, herbs, and supplements.  · Have an adult family member or friend drive you home after the procedure.  · For the first 24 hours after your  [facility] and given to [facility] staff to scan into chart.    surgery:  ¨ Do not drive or use heavy equipment.  ¨ Do not make important decisions or sign documents.  ¨ Avoid alcohol.  ¨ Have someone stay with you, if possible. They can watch for problems and help keep you safe.    Date Last Reviewed: 12/1/2016  © 1011-4752 [employer]. [address] [place] [address], [place] [address]. All rights reserved. This information is not intended as a substitute for professional medical care. Always follow your healthcare professional's instructions.

## 2025-08-20 ENCOUNTER — OFFICE VISIT (OUTPATIENT)
Dept: SPORTS MEDICINE | Facility: CLINIC | Age: 40
End: 2025-08-20
Payer: COMMERCIAL

## 2025-08-20 VITALS
WEIGHT: 192.88 LBS | DIASTOLIC BLOOD PRESSURE: 79 MMHG | HEART RATE: 90 BPM | SYSTOLIC BLOOD PRESSURE: 118 MMHG | BODY MASS INDEX: 33.11 KG/M2

## 2025-08-20 DIAGNOSIS — M17.11 PRIMARY OSTEOARTHRITIS OF RIGHT KNEE: Primary | ICD-10-CM

## 2025-08-20 DIAGNOSIS — M17.12 PRIMARY OSTEOARTHRITIS OF LEFT KNEE: ICD-10-CM

## 2025-08-20 PROCEDURE — 99499 UNLISTED E&M SERVICE: CPT | Mod: S$GLB,,, | Performed by: PHYSICIAN ASSISTANT

## 2025-08-20 PROCEDURE — 20610 DRAIN/INJ JOINT/BURSA W/O US: CPT | Mod: 50,S$GLB,, | Performed by: PHYSICIAN ASSISTANT

## 2025-08-20 PROCEDURE — 99999 PR PBB SHADOW E&M-EST. PATIENT-LVL IV: CPT | Mod: PBBFAC,,, | Performed by: PHYSICIAN ASSISTANT

## 2025-08-20 RX ORDER — TRIAMCINOLONE ACETONIDE 40 MG/ML
40 INJECTION, SUSPENSION INTRA-ARTICULAR; INTRAMUSCULAR
Status: COMPLETED | OUTPATIENT
Start: 2025-08-20 | End: 2025-08-20

## 2025-08-20 RX ADMIN — TRIAMCINOLONE ACETONIDE 40 MG: 40 INJECTION, SUSPENSION INTRA-ARTICULAR; INTRAMUSCULAR at 12:08

## 2025-08-28 DIAGNOSIS — E66.09 CLASS 2 OBESITY DUE TO EXCESS CALORIES WITH BODY MASS INDEX (BMI) OF 35.0 TO 35.9 IN ADULT, UNSPECIFIED WHETHER SERIOUS COMORBIDITY PRESENT: ICD-10-CM

## 2025-08-28 DIAGNOSIS — E66.812 CLASS 2 OBESITY DUE TO EXCESS CALORIES WITH BODY MASS INDEX (BMI) OF 35.0 TO 35.9 IN ADULT, UNSPECIFIED WHETHER SERIOUS COMORBIDITY PRESENT: ICD-10-CM

## 2025-08-28 RX ORDER — OMEPRAZOLE 40 MG/1
CAPSULE, DELAYED RELEASE ORAL
Qty: 90 CAPSULE | Refills: 0 | OUTPATIENT
Start: 2025-08-28

## 2025-08-29 RX ORDER — OMEPRAZOLE 40 MG/1
CAPSULE, DELAYED RELEASE ORAL
Qty: 90 CAPSULE | Refills: 0 | Status: SHIPPED | OUTPATIENT
Start: 2025-08-29

## 2025-08-29 RX ORDER — OMEPRAZOLE 40 MG/1
CAPSULE, DELAYED RELEASE ORAL
Qty: 90 CAPSULE | Refills: 0 | Status: CANCELLED | OUTPATIENT
Start: 2025-08-29

## (undated) DEVICE — APPLICATOR CHLORAPREP ORN 26ML

## (undated) DEVICE — SEE MEDLINE ITEM 152515

## (undated) DEVICE — ELECTRODE REM PLYHSV RETURN 9

## (undated) DEVICE — SYR B-D DISP CONTROL 10CC100/C

## (undated) DEVICE — GAUZE SPONGE 4X4 12PLY

## (undated) DEVICE — PADDING CAST 4IN SPECIALIST

## (undated) DEVICE — SUT MCRYL PLUS 4-0 PS2 27IN

## (undated) DEVICE — DRAPE CORETEMP FLD WRM 56X62IN

## (undated) DEVICE — SPLINT FIBERGLASS PAD 4X15

## (undated) DEVICE — GLOVE BIOGEL SKINSENSE PI 7.5

## (undated) DEVICE — BANDAGE DERMACEA STRETCH 4X1IN

## (undated) DEVICE — KIT ANTIFOG

## (undated) DEVICE — ADHESIVE MASTISOL VIAL 48/BX

## (undated) DEVICE — DRESSING TRANS 4X4 TEGADERM

## (undated) DEVICE — UNDERGLOVES BIOGEL PI SIZE 8

## (undated) DEVICE — CLOSURE SKIN STERI STRIP 1/2X4

## (undated) DEVICE — SOL NACL 0.9% INJ PF/50151

## (undated) DEVICE — SOL 9P NACL IRR PIC IL

## (undated) DEVICE — DRESSING XEROFORM FOIL PK 1X8

## (undated) DEVICE — ELECTRODE NEEDLE 1IN

## (undated) DEVICE — NDL HYPO REG 25G X 1 1/2

## (undated) DEVICE — PACK UPPER EXTREMITY BAPTIST

## (undated) DEVICE — CHLORAPREP W TINT 26ML APPL

## (undated) DEVICE — TRAY MINOR GEN SURG

## (undated) DEVICE — TOURNIQUET SB QC SP 18X4IN

## (undated) DEVICE — BUCKET PLASTER DISPOSABLE

## (undated) DEVICE — DRESSING XEROFORM 1X8IN

## (undated) DEVICE — SUT 4/0 18IN ETHILON BL P3

## (undated) DEVICE — PAD CAST SPECIALIST STRL 4

## (undated) DEVICE — SYS SMOKE EVACUATION LAP

## (undated) DEVICE — SEE MEDLINE ITEM 146308

## (undated) DEVICE — SPLINT FIBERGLASS PAD 5X20

## (undated) DEVICE — TROCAR ENDOPATH XCEL 12MM 10CM

## (undated) DEVICE — DRAPE STERI-DRAPE 1000 17X11IN

## (undated) DEVICE — PAD UNDERPAD 30X30

## (undated) DEVICE — TUBING HF INSUFFLATION W/ FLTR

## (undated) DEVICE — TROCAR ENDOPATH XCEL 5X100MM

## (undated) DEVICE — GOWN SURGICAL X-LARGE

## (undated) DEVICE — SEE MEDLINE ITEM 157117

## (undated) DEVICE — SPONGE DERMACEA GAUZE 4X4

## (undated) DEVICE — SCISSOR 5MMX35CM DIRECT DRIVE

## (undated) DEVICE — SUT 0 VICRYL / UR6 (J603)

## (undated) DEVICE — CANNULA ENDOPATH XCEL 5X100MM

## (undated) DEVICE — DRAPE ABDOMINAL TIBURON 14X11

## (undated) DEVICE — SHEARS HARMONIC 5CM 36CM